# Patient Record
Sex: MALE | Race: WHITE | NOT HISPANIC OR LATINO | Employment: OTHER | URBAN - METROPOLITAN AREA
[De-identification: names, ages, dates, MRNs, and addresses within clinical notes are randomized per-mention and may not be internally consistent; named-entity substitution may affect disease eponyms.]

---

## 2017-05-31 RX ORDER — ROSUVASTATIN CALCIUM 5 MG/1
0.5 TABLET, COATED ORAL EVERY OTHER DAY
COMMUNITY
Start: 2017-03-28 | End: 2018-08-16

## 2017-06-01 ENCOUNTER — OFFICE VISIT (OUTPATIENT)
Dept: FAMILY MEDICINE | Facility: CLINIC | Age: 62
End: 2017-06-01
Payer: COMMERCIAL

## 2017-06-01 VITALS
TEMPERATURE: 98 F | HEIGHT: 67 IN | SYSTOLIC BLOOD PRESSURE: 119 MMHG | WEIGHT: 180 LBS | BODY MASS INDEX: 28.25 KG/M2 | HEART RATE: 77 BPM | DIASTOLIC BLOOD PRESSURE: 71 MMHG

## 2017-06-01 DIAGNOSIS — J20.9 ACUTE BRONCHITIS, UNSPECIFIED ORGANISM: Primary | ICD-10-CM

## 2017-06-01 PROBLEM — E78.2 MULTIPLE-TYPE HYPERLIPIDEMIA: Status: ACTIVE | Noted: 2017-06-01

## 2017-06-01 PROBLEM — Z13.89 ENCOUNTER FOR SCREENING FOR OTHER DISORDER: Status: ACTIVE | Noted: 2017-06-01

## 2017-06-01 PROBLEM — Z78.9 NON-SMOKER: Status: ACTIVE | Noted: 2017-06-01

## 2017-06-01 PROBLEM — I10 BENIGN ESSENTIAL HYPERTENSION: Status: ACTIVE | Noted: 2017-06-01

## 2017-06-01 PROBLEM — E66.3 OVERWEIGHT: Status: ACTIVE | Noted: 2017-06-01

## 2017-06-01 PROBLEM — F41.1 ANXIETY, GENERALIZED: Status: ACTIVE | Noted: 2017-06-01

## 2017-06-01 PROCEDURE — 99212 OFFICE O/P EST SF 10 MIN: CPT | Mod: ,,, | Performed by: INTERNAL MEDICINE

## 2017-06-01 RX ORDER — PRAVASTATIN SODIUM 20 MG/1
1 TABLET ORAL DAILY
COMMUNITY
Start: 2017-05-05 | End: 2018-08-16 | Stop reason: SDUPTHER

## 2017-06-01 NOTE — PATIENT INSTRUCTIONS
What Is Acute Bronchitis?  Acute or short-term bronchitis last for days or weeks. It occurs when the bronchial tubes (airways in the lungs) are irritated by a virus, bacteria, or allergen. This causes a cough that produces yellow or greenish mucus.  Inside healthy lungs    Air travels in and out of the lungs through the airways. The linings of these airways produce sticky mucus. This mucus traps particles that enter the lungs. Tiny structures called cilia then sweep the particles out of the airways.     Healthy airway: Airways are normally open. Air moves in and out easily.      Healthy cilia: Tiny, hairlike cilia sweep mucus and particles up and out of the airways.   Lungs with bronchitis  Bronchitis often occurs with a cold or the flu virus. The airways become inflamed (red and swollen). There is a deep hacking cough from the extra mucus. Other symptoms may include:  · Wheezing  · Chest discomfort  · Shortness of breath  · Mild fever  A second infection, this time due to bacteria, may then occur. And airways irritated by allergens or smoke are more likely to get infected.        Inflamed airway: Inflammation and extra mucus narrow the airway, causing shortness of breath.      Impaired cilia: Extra mucus impairs cilia, causing congestion and wheezing. Smoking makes the problem worse.   Making a diagnosis  A physical exam, health history, and certain tests help your healthcare provider make the diagnosis.  Health history  Your healthcare provider will ask you about your symptoms.  The exam  Your provider listens to your chest for signs of congestion. He or she may also check your ears, nose, and throat.  Possible tests  · A sputum test for bacteria. This requires a sample of mucus from the lungs.  · A nasal or throat swab for bacterial infection.  · A chest X-ray if your healthcare provider thinks you have pneumonia.  · Tests to check for an underlying condition, such as allergies, asthma, or COPD. You may need  to see a specialist for more lung function testing.  Treating a cough  The main treatment for bronchitis is easing symptoms. Avoiding smoke, allergens, and other things that trigger coughing can often help. If the infection is bacterial, you may be given antibiotics. During the illness, it's important to get plenty of sleep. To ease symptoms:  · Dont smoke, and avoid secondhand smoke.  · Use a humidifier, or breathe in steam from a hot shower. This may help loosen mucus.  · Drink a lot of water and juice. They can soothe the throat and may help thin mucus.  · Sit up or use extra pillows when in bed to help lessen coughing and congestion.  · Ask your provider about using cough medicine, pain and fever medicine, or a decongestant.  Antibiotics  Most cases of bronchitis are caused by cold or flu viruses. Antibiotics dont treat viral illness. Taking antibiotics when they are not needed increases your risk of getting an infection later that is antibiotic-resistant. Your provider will prescribe antibiotics if the infection is caused by bacteria. If they are prescribed:  · Take the medicine until it is used up, even if symptoms have improved. If you dont, the bronchitis may come back.  · Take them as directed. For instance, some medicines should be taken with food.  · Ask your provider or pharmacist what side effects are common, and what to do about them.  Follow-up care  You should see your provider again in 2 to 3 weeks. By this time, symptoms should have improved. An infection that lasts longer may mean you have a more serious problem.  Prevention  · Avoid tobacco smoke. If you smoke, quit. Stay away from smoky places. Ask friends and family not to smoke around you, or in your home or car.  · Get checked for allergies.  · Ask your provider about getting a yearly flu shot, and pneumococcal or pneumonia shots.  · Wash your hands often. This helps reduce the chance of picking up viruses that cause colds and flu.  Call  your healthcare provider if:  · Symptoms worsen, or new symptoms develop.  · Breathing problems worsen or  become severe.  · Symptoms dont get better within a week, or within 3 days of taking antibiotics.   Date Last Reviewed: 6/18/2014  © 5700-6110 [employer]. [address] [place] [address], [place] [address]. All rights reserved. This information is not intended as a substitute for professional medical care. Always follow your healthcare professional's instructions.

## 2017-06-01 NOTE — PROGRESS NOTES
Subjective:       Patient ID: Yan Hernandez Jr. is a 62 y.o. male.    Chief Complaint: Follow-up (ED  cough susy fever ST )    Past Medical History:   Diagnosis Date    Diabetes mellitus     Diabetes mellitus, type 2     GERD (gastroesophageal reflux disease)     Hyperlipidemia     Hypertension        History reviewed. No pertinent surgical history.    Family History   Problem Relation Age of Onset    Arthritis Mother     Alzheimer's disease Mother     Cancer Father        Social History     Social History    Marital status: Single     Spouse name: N/A    Number of children: N/A    Years of education: N/A     Social History Main Topics    Smoking status: Former Smoker    Smokeless tobacco: None    Alcohol use No    Drug use: No    Sexual activity: No     Other Topics Concern    None     Social History Narrative    None       Current Outpatient Prescriptions   Medication Sig Dispense Refill    aspirin (ECOTRIN) 81 MG EC tablet Take 81 mg by mouth once daily.      fenofibrate (TRICOR) 48 MG tablet Take 48 mg by mouth once daily.      lisinopril (PRINIVIL,ZESTRIL) 5 MG tablet Take by mouth once daily.      metformin (GLUCOPHAGE) 1000 MG tablet Take 1,000 mg by mouth 2 (two) times daily with meals.      pantoprazole (PROTONIX) 40 MG tablet Take 40 mg by mouth once daily.      paroxetine (PAXIL) 20 MG tablet Take 20 mg by mouth every morning.      pravastatin (PRAVACHOL) 20 MG tablet 1 tablet Daily.      rosuvastatin (CRESTOR) 5 MG tablet Take 0.5 tablets by mouth every other day.       No current facility-administered medications for this visit.        Review of patient's allergies indicates:   Allergen Reactions    Atorvastatin      myalgias    Influenza virus vaccines      Gets sick with shots       Patient is a 62-year-old occasion male who went to the emergency.  Few days back  For cough and cold symptoms. He was diagnosed with acute bronchitis and was prescribed  Z-Harsh and a strong  pulse. He had been afebrile. He did complain of some cough with  Greenish expectoration.  Patient is feeding significantly Better. His blood sugars are fairly under  Control.      Cough   This is a new problem. The current episode started in the past 7 days. The problem has been rapidly improving. The problem occurs hourly. Pertinent negatives include no chest pain, chills, ear pain, eye redness, fever, rash, rhinorrhea, sore throat or wheezing. Nothing aggravates the symptoms. He has tried prescription cough suppressant and rest (Zpak) for the symptoms. The treatment provided significant relief. There is no history of COPD or emphysema.     Review of Systems   Constitutional: Negative for activity change, chills and fever.   HENT: Negative for congestion, drooling, ear pain, nosebleeds, rhinorrhea, sore throat and voice change.    Eyes: Negative for redness and visual disturbance.   Respiratory: Negative for apnea, cough, choking and wheezing.    Cardiovascular: Negative for chest pain, palpitations and leg swelling.   Gastrointestinal: Negative for abdominal distention, abdominal pain and anal bleeding.   Endocrine: Negative for cold intolerance and heat intolerance.   Skin: Negative for color change and rash.       Objective:      Physical Exam   Constitutional: He is oriented to person, place, and time. He appears well-developed and well-nourished.   HENT:   Head: Normocephalic and atraumatic.   Nose: Nose normal.   Mouth/Throat: Oropharynx is clear and moist. No oropharyngeal exudate.   Eyes: Conjunctivae and EOM are normal.   Neck: Normal range of motion. Neck supple. No JVD present. No tracheal deviation present. No thyromegaly present.   Cardiovascular: Normal rate, regular rhythm and normal heart sounds.  Exam reveals no gallop and no friction rub.    No murmur heard.  Pulmonary/Chest: Effort normal and breath sounds normal. No respiratory distress. He has no wheezes. He has no rales.   Abdominal: Soft.  Bowel sounds are normal. He exhibits no distension. There is no tenderness.   Musculoskeletal: Normal range of motion.   Neurological: He is alert and oriented to person, place, and time. He has normal reflexes.   Skin: Skin is warm and dry.   Psychiatric: He has a normal mood and affect.   Nursing note and vitals reviewed.      Assessment:       1. Acute bronchitis, unspecified organism        Plan:       Patient seems to be recovering  Fairly well from the bronchitis symptoms.  He is almost finishing his course of Zithromax. He was prescribed Tessalon Perles also.    He has been recommended saltwater gargles and steam inhalation. He has some wax in the left ear for which I have made him recommendations for Debrox ear drops or hydrogen peroxide/warm water syringing.

## 2017-07-25 RX ORDER — FENOFIBRATE 160 MG/1
TABLET ORAL
Qty: 90 TABLET | Refills: 1 | Status: SHIPPED | OUTPATIENT
Start: 2017-07-25 | End: 2018-04-13 | Stop reason: SDUPTHER

## 2017-07-25 RX ORDER — PAROXETINE HYDROCHLORIDE HEMIHYDRATE 25 MG/1
TABLET, FILM COATED, EXTENDED RELEASE ORAL
Qty: 90 TABLET | Refills: 1 | Status: SHIPPED | OUTPATIENT
Start: 2017-07-25 | End: 2018-02-07 | Stop reason: SDUPTHER

## 2017-08-07 RX ORDER — LISINOPRIL 20 MG/1
TABLET ORAL
Qty: 90 TABLET | Refills: 4 | Status: SHIPPED | OUTPATIENT
Start: 2017-08-07 | End: 2018-08-16 | Stop reason: SDUPTHER

## 2017-09-04 PROBLEM — Z13.89 ENCOUNTER FOR SCREENING FOR OTHER DISORDER: Status: RESOLVED | Noted: 2017-06-01 | Resolved: 2017-09-04

## 2017-09-05 RX ORDER — METFORMIN HYDROCHLORIDE 1000 MG/1
1000 TABLET ORAL 2 TIMES DAILY WITH MEALS
Qty: 180 TABLET | Refills: 3 | Status: SHIPPED | OUTPATIENT
Start: 2017-09-05 | End: 2017-11-11 | Stop reason: SDUPTHER

## 2017-11-08 ENCOUNTER — TELEPHONE (OUTPATIENT)
Dept: FAMILY MEDICINE | Facility: CLINIC | Age: 62
End: 2017-11-08

## 2017-11-11 DIAGNOSIS — E11.9 TYPE 2 DIABETES MELLITUS WITHOUT COMPLICATION, WITHOUT LONG-TERM CURRENT USE OF INSULIN: Primary | ICD-10-CM

## 2017-11-11 RX ORDER — METFORMIN HYDROCHLORIDE 500 MG/1
500 TABLET ORAL 2 TIMES DAILY WITH MEALS
Qty: 180 TABLET | Refills: 3 | Status: SHIPPED | OUTPATIENT
Start: 2017-11-11 | End: 2018-08-20

## 2018-02-07 RX ORDER — PAROXETINE HYDROCHLORIDE HEMIHYDRATE 25 MG/1
TABLET, FILM COATED, EXTENDED RELEASE ORAL
Qty: 90 TABLET | Refills: 1 | Status: SHIPPED | OUTPATIENT
Start: 2018-02-07 | End: 2018-08-27 | Stop reason: SDUPTHER

## 2018-04-13 RX ORDER — FENOFIBRATE 160 MG/1
TABLET ORAL
Qty: 90 TABLET | Refills: 0 | Status: SHIPPED | OUTPATIENT
Start: 2018-04-13 | End: 2018-07-18 | Stop reason: SDUPTHER

## 2018-07-18 ENCOUNTER — TELEPHONE (OUTPATIENT)
Dept: FAMILY MEDICINE | Facility: CLINIC | Age: 63
End: 2018-07-18

## 2018-07-18 RX ORDER — FENOFIBRATE 160 MG/1
TABLET ORAL
Qty: 30 TABLET | Refills: 0 | Status: SHIPPED | OUTPATIENT
Start: 2018-07-18 | End: 2018-08-27 | Stop reason: SDUPTHER

## 2018-07-25 NOTE — TELEPHONE ENCOUNTER
Called and left a message for patient to call back to make an appointment for any further refills.

## 2018-08-13 RX ORDER — LISINOPRIL 20 MG/1
TABLET ORAL
Qty: 90 TABLET | Refills: 4 | OUTPATIENT
Start: 2018-08-13

## 2018-08-16 ENCOUNTER — OFFICE VISIT (OUTPATIENT)
Dept: FAMILY MEDICINE | Facility: CLINIC | Age: 63
End: 2018-08-16
Payer: COMMERCIAL

## 2018-08-16 VITALS
DIASTOLIC BLOOD PRESSURE: 87 MMHG | SYSTOLIC BLOOD PRESSURE: 130 MMHG | HEART RATE: 69 BPM | WEIGHT: 182 LBS | BODY MASS INDEX: 28.56 KG/M2 | HEIGHT: 67 IN

## 2018-08-16 DIAGNOSIS — I10 ESSENTIAL HYPERTENSION: ICD-10-CM

## 2018-08-16 DIAGNOSIS — F41.9 ANXIETY: ICD-10-CM

## 2018-08-16 DIAGNOSIS — K21.9 GASTROESOPHAGEAL REFLUX DISEASE WITHOUT ESOPHAGITIS: ICD-10-CM

## 2018-08-16 DIAGNOSIS — Z11.59 NEED FOR HEPATITIS C SCREENING TEST: ICD-10-CM

## 2018-08-16 DIAGNOSIS — E78.00 HYPERCHOLESTEREMIA: ICD-10-CM

## 2018-08-16 DIAGNOSIS — E11.9 TYPE 2 DIABETES MELLITUS WITHOUT COMPLICATION, WITHOUT LONG-TERM CURRENT USE OF INSULIN: Primary | ICD-10-CM

## 2018-08-16 LAB
ALBUMIN SERPL-MCNC: 4.5 G/DL (ref 3.1–4.7)
ALP SERPL-CCNC: 33 IU/L (ref 40–104)
ALT (SGPT): 32 IU/L (ref 3–33)
AST SERPL-CCNC: 24 IU/L (ref 10–40)
BILIRUB SERPL-MCNC: 0.9 MG/DL (ref 0.3–1)
BUN SERPL-MCNC: 24 MG/DL (ref 8–20)
CALCIUM SERPL-MCNC: 9.8 MG/DL (ref 7.7–10.4)
CHLORIDE: 102 MMOL/L (ref 98–110)
CO2 SERPL-SCNC: 24.1 MMOL/L (ref 22.8–31.6)
CREATININE RANDOM URINE: 163 MG/DL
CREATININE: 0.85 MG/DL (ref 0.6–1.4)
GLUCOSE: 149 MG/DL (ref 70–99)
HBA1C MFR BLD: 7.4 % (ref 3.1–6.5)
MICROALBUM.,U,RANDOM: 13 MCG/ML (ref 0–19.9)
MICROALBUMIN/CREATININE RATIO: 8 (ref 0–30)
POTASSIUM SERPL-SCNC: 4.2 MMOL/L (ref 3.5–5)
PROT SERPL-MCNC: 7.3 G/DL (ref 6–8.2)
SODIUM: 137 MMOL/L (ref 134–144)

## 2018-08-16 PROCEDURE — 3075F SYST BP GE 130 - 139MM HG: CPT | Mod: ,,, | Performed by: INTERNAL MEDICINE

## 2018-08-16 PROCEDURE — 3008F BODY MASS INDEX DOCD: CPT | Mod: ,,, | Performed by: INTERNAL MEDICINE

## 2018-08-16 PROCEDURE — 3079F DIAST BP 80-89 MM HG: CPT | Mod: ,,, | Performed by: INTERNAL MEDICINE

## 2018-08-16 PROCEDURE — 99214 OFFICE O/P EST MOD 30 MIN: CPT | Mod: ,,, | Performed by: INTERNAL MEDICINE

## 2018-08-16 RX ORDER — PRAVASTATIN SODIUM 20 MG/1
20 TABLET ORAL NIGHTLY
Qty: 90 TABLET | Refills: 1 | Status: SHIPPED | OUTPATIENT
Start: 2018-08-16 | End: 2018-10-31 | Stop reason: ALTCHOICE

## 2018-08-16 RX ORDER — LISINOPRIL 20 MG/1
20 TABLET ORAL DAILY
Qty: 90 TABLET | Refills: 1 | Status: SHIPPED | OUTPATIENT
Start: 2018-08-16 | End: 2019-02-19 | Stop reason: SDUPTHER

## 2018-08-16 NOTE — PROGRESS NOTES
Subjective:       Patient ID: Yan Hernandez Jr. is a 63 y.o. male.    Chief Complaint: Diabetes; Hypertension; Hyperlipidemia; Anxiety; and Gastroesophageal Reflux    Mr. Heredia is a 63-year-old  male who comes for follow-up. His underlying medical issues include diabetes mellitus, hypertension, dyslipidemia,gastroesophageal reflux, anxiety.    He states that blood sugars are doing ok. Blood pressures are okay. He cannot get off       Diabetes   He presents for his follow-up diabetic visit. He has type 2 diabetes mellitus. Hypoglycemia symptoms include nervousness/anxiousness. Pertinent negatives for hypoglycemia include no confusion, dizziness, mood changes, pallor, seizures or speech difficulty. Pertinent negatives for diabetes include no chest pain, no fatigue, no foot ulcerations, no polydipsia, no polyphagia and no polyuria. Symptoms are stable. Pertinent negatives for diabetic complications include no nephropathy, peripheral neuropathy or PVD. Risk factors for coronary artery disease include hypertension, male sex, diabetes mellitus and dyslipidemia. Current diabetic treatment includes oral agent (monotherapy). He is compliant with treatment most of the time. Meal planning includes avoidance of concentrated sweets. He has not had a previous visit with a dietitian. An ACE inhibitor/angiotensin II receptor blocker is being taken. He sees a podiatrist.Eye exam is current.   Hypertension   This is a chronic problem. The current episode started more than 1 year ago. Associated symptoms include anxiety. Pertinent negatives include no chest pain, palpitations or shortness of breath. Risk factors for coronary artery disease include male gender, diabetes mellitus and dyslipidemia. Past treatments include ACE inhibitors. The current treatment provides moderate improvement. There is no history of PVD. There is no history of coarctation of the aorta, a hypertension causing med or pheochromocytoma.   Hyperlipidemia    This is a chronic problem. The current episode started more than 1 year ago. He has no history of hypothyroidism or obesity. Pertinent negatives include no chest pain or shortness of breath. Current antihyperlipidemic treatment includes statins. The current treatment provides moderate improvement of lipids. Risk factors for coronary artery disease include hypertension, male sex, dyslipidemia and diabetes mellitus.   Anxiety   Presents for follow-up visit. Symptoms include nervous/anxious behavior. Patient reports no chest pain, confusion, depressed mood, dizziness, insomnia, malaise, palpitations, restlessness or shortness of breath. Symptoms occur occasionally. The severity of symptoms is moderate. The quality of sleep is fair.       Gastroesophageal Reflux   He complains of heartburn. He reports no abdominal pain, no chest pain, no coughing or no stridor. This is a chronic problem. The current episode started more than 1 year ago. Exacerbated by: Carbonated. Pertinent negatives include no fatigue. He has tried a PPI for the symptoms. The treatment provided moderate relief. Past procedures include an EGD (dysphagia and leg grade 1 esophagitis-Dr. Sprague).       Past Medical History:   Diagnosis Date    Allergy     atorvastatin, Influenza vaccine    Diabetes mellitus     Diabetes mellitus, type 2     GERD (gastroesophageal reflux disease)     Hyperlipidemia     Hypertension      Social History     Socioeconomic History    Marital status: Single     Spouse name: Not on file    Number of children: 0    Years of education: Not on file    Highest education level: Not on file   Social Needs    Financial resource strain: Not on file    Food insecurity - worry: Not on file    Food insecurity - inability: Not on file    Transportation needs - medical: Not on file    Transportation needs - non-medical: Not on file   Occupational History    Occupation: [employer]     Employer: [employer]  "  Tobacco Use    Smoking status: Former Smoker     Types: Cigarettes   Substance and Sexual Activity    Alcohol use: No    Drug use: No    Sexual activity: No   Other Topics Concern    Not on file   Social History Narrative    Not on file     History reviewed. No pertinent surgical history.  Family History   Problem Relation Age of Onset    Arthritis Mother     Alzheimer's disease Mother     Cancer Father        Review of Systems   Constitutional: Negative for activity change, appetite change, fatigue and unexpected weight change.   HENT: Negative for congestion, sneezing and trouble swallowing.    Eyes: Negative for pain and visual disturbance.   Respiratory: Negative for cough, chest tightness and shortness of breath.    Cardiovascular: Negative for chest pain, palpitations and leg swelling.        Hypertension stable.   Gastrointestinal: Positive for heartburn. Negative for abdominal distention, abdominal pain, blood in stool, constipation and diarrhea.        Gastroesophageal reflux with LA grade 1 esophagitis.   Endocrine: Negative for cold intolerance, heat intolerance, polydipsia, polyphagia and polyuria.        Diabetes mellitus type2.. Hyperlipidemia.   Genitourinary: Negative for dysuria, hematuria and scrotal swelling.   Musculoskeletal: Negative for arthralgias, back pain and gait problem.   Skin: Negative for pallor, rash and wound.   Allergic/Immunologic: Negative for environmental allergies, food allergies and immunocompromised state.   Neurological: Negative for dizziness, seizures, speech difficulty, light-headedness and numbness.   Hematological: Negative for adenopathy. Does not bruise/bleed easily.   Psychiatric/Behavioral: Negative for agitation, behavioral problems and confusion. The patient is nervous/anxious. The patient does not have insomnia.         History of anxiety stable on Paxil.         Objective:      Blood pressure 130/87, pulse 69, height 5' 7" (1.702 m), weight 82.6 kg " (182 lb). Body mass index is 28.51 kg/m².  Physical Exam   Constitutional: He appears well-developed.   HENT:   Head: Normocephalic and atraumatic.   Nose: Nose normal.   Mouth/Throat: Oropharynx is clear and moist. No oropharyngeal exudate.   Eyes: Conjunctivae and EOM are normal.   Neck: Normal range of motion. Neck supple. No JVD present. No tracheal deviation present. No thyromegaly present.   Cardiovascular: Normal rate, regular rhythm and normal heart sounds. Exam reveals no gallop and no friction rub.   No murmur heard.  Pulmonary/Chest: Effort normal and breath sounds normal. No respiratory distress. He has no wheezes. He has no rales.   Abdominal: Soft. Bowel sounds are normal. He exhibits no distension. There is no tenderness.   Musculoskeletal:        Right foot: There is normal range of motion and no deformity.        Left foot: There is normal range of motion and no deformity.   Feet:   Right Foot:   Protective Sensation: 5 sites tested. 5 sites sensed.   Skin Integrity: Negative for ulcer, blister or skin breakdown.   Left Foot:   Protective Sensation: 5 sites tested. 5 sites sensed.   Skin Integrity: Negative for ulcer, blister or skin breakdown.   Neurological: He is alert. He has normal reflexes.   Skin: Skin is warm and dry.   Psychiatric: He has a normal mood and affect.   Nursing note and vitals reviewed.        Assessment:       1. Type 2 diabetes mellitus without complication, without long-term current use of insulin    2. Hypercholesteremia    3. Anxiety    4. Essential hypertension    5. Gastroesophageal reflux disease without esophagitis    6. Need for hepatitis C screening test           No visits with results within 3 Month(s) from this visit.   Latest known visit with results is:   Admission on 01/16/2016, Discharged on 01/16/2016   Component Date Value Ref Range Status    WBC 01/16/2016 5.25  3.90 - 12.70 K/uL Final    RBC 01/16/2016 4.83  4.60 - 6.20 M/uL Final    Hemoglobin  01/16/2016 14.2  14.0 - 18.0 g/dL Final    Hematocrit 01/16/2016 40.6  40.0 - 54.0 % Final    MCV 01/16/2016 84  82 - 98 fL Final    MCH 01/16/2016 29.4  27.0 - 31.0 pg Final    MCHC 01/16/2016 35.0  32.0 - 36.0 % Final    RDW 01/16/2016 12.1  11.5 - 14.5 % Final    Platelets 01/16/2016 235  150 - 350 K/uL Final    MPV 01/16/2016 9.7  9.2 - 12.9 fL Final    Gran # (ANC) 01/16/2016 3.2  1.8 - 7.7 K/uL Final    Lymph # 01/16/2016 1.2  1.0 - 4.8 K/uL Final    Mono # 01/16/2016 0.4  0.3 - 1.0 K/uL Final    Eos # 01/16/2016 0.4  0.0 - 0.5 K/uL Final    Baso # 01/16/2016 0.05  0.00 - 0.20 K/uL Final    Gran% 01/16/2016 60.7  38.0 - 73.0 % Final    Lymph% 01/16/2016 21.9  18.0 - 48.0 % Final    Mono% 01/16/2016 7.2  4.0 - 15.0 % Final    Eosinophil% 01/16/2016 8.4* 0.0 - 8.0 % Final    Basophil% 01/16/2016 1.0  0.0 - 1.9 % Final    Differential Method 01/16/2016 Automated   Final    Sodium 01/16/2016 138  136 - 145 mmol/L Final    Potassium 01/16/2016 4.2  3.5 - 5.1 mmol/L Final    Chloride 01/16/2016 104  95 - 110 mmol/L Final    CO2 01/16/2016 25  23 - 29 mmol/L Final    Glucose 01/16/2016 111* 70 - 110 mg/dL Final    BUN, Bld 01/16/2016 15  8 - 23 mg/dL Final    Creatinine 01/16/2016 0.9  0.5 - 1.4 mg/dL Final    Calcium 01/16/2016 9.7  8.7 - 10.5 mg/dL Final    Total Protein 01/16/2016 6.9  6.0 - 8.4 g/dL Final    Albumin 01/16/2016 4.4  3.5 - 5.2 g/dL Final    Total Bilirubin 01/16/2016 0.6  0.1 - 1.0 mg/dL Final    Alkaline Phosphatase 01/16/2016 47* 55 - 135 U/L Final    AST 01/16/2016 18  10 - 40 U/L Final    ALT 01/16/2016 37  10 - 44 U/L Final    Anion Gap 01/16/2016 9  8 - 16 mmol/L Final    eGFR if African American 01/16/2016 >60  >60 mL/min/1.73 m^2 Final    eGFR if non African American 01/16/2016 >60  >60 mL/min/1.73 m^2 Final    Prothrombin Time 01/16/2016 10.6  9.0 - 12.5 sec Final    INR 01/16/2016 1.0  0.8 - 1.2 Final    Troponin I 01/16/2016 <0.006  0.000 - 0.026  ng/mL Final    BNP 01/16/2016 11  0 - 99 pg/mL Final    Specimen UA 01/16/2016 Urine, Clean Catch   Final    Color, UA 01/16/2016 Yellow  Yellow, Straw, Nina Final    Appearance, UA 01/16/2016 Clear  Clear Final    pH, UA 01/16/2016 7.0  5.0 - 8.0 Final    Specific Gravity, UA 01/16/2016 <=1.005* 1.005 - 1.030 Final    Protein, UA 01/16/2016 Negative  Negative Final    Glucose, UA 01/16/2016 Negative  Negative Final    Ketones, UA 01/16/2016 Negative  Negative Final    Bilirubin (UA) 01/16/2016 Negative  Negative Final    Occult Blood UA 01/16/2016 Negative  Negative Final    Nitrite, UA 01/16/2016 Negative  Negative Final    Urobilinogen, UA 01/16/2016 Negative  <2.0 EU/dL Final    Leukocytes, UA 01/16/2016 Negative  Negative Final    POCT Glucose 01/16/2016 105  70 - 110 mg/dL Final         Plan:           Type 2 diabetes mellitus without complication, without long-term current use of insulin  -     Microalbumin/creatinine urine ratio; Future; Expected date: 08/16/2018  -     Hemoglobin A1c; Future; Expected date: 08/16/2018    Hypercholesteremia  -     Lipid panel; Future; Expected date: 08/16/2018  -     pravastatin (PRAVACHOL) 20 MG tablet; Take 1 tablet (20 mg total) by mouth every evening.  Dispense: 90 tablet; Refill: 1    Anxiety    Essential hypertension  -     Comprehensive metabolic panel; Future; Expected date: 08/16/2018  -     lisinopril (PRINIVIL,ZESTRIL) 20 MG tablet; Take 1 tablet (20 mg total) by mouth once daily.  Dispense: 90 tablet; Refill: 1    Gastroesophageal reflux disease without esophagitis    Need for hepatitis C screening test  -     Hepatitis C antibody; Future; Expected date: 08/16/2018    Other orders  -     Cancel: Hemoglobin A1c; Future; Expected date: 08/16/2018    Advised Mr. Hernandez to monitor Blood sugars at home and record them.  Exercise, watch diet and loose weight.  keep a close eye on feet and keep them clean. Annual eye examination. Annual influenza vaccine.   Monitor HgbA1c every 3 to 6 months. Monitor urine microalbumin every year.keep LDL less than 100. Monitor blood pressure and target blood pressure 120/70.        The patient is asked to make an attempt to improve diet and exercise patterns to aid in medical management of this problem.    Patient has been advised to watch diet and exercise. Avoid fried and fatty food. Compliance to medications and follow up urged.    I Did discuss briefly about the possibility of gradually weaning off Paxil.  Patient panicked at this thought. Will table this discussion for future.    Advised Mr. Hernandez for Anti reflux measures like small feequent meals, avoid spicy and greasy food. Head end up at night.    Fup 4 months      Current Outpatient Medications:     aspirin (ECOTRIN) 81 MG EC tablet, Take 81 mg by mouth once daily., Disp: , Rfl:     fenofibrate 160 MG Tab, TAKE 1 TABLET BY MOUTH AT BEDTIME, Disp: 30 tablet, Rfl: 0    lisinopril (PRINIVIL,ZESTRIL) 20 MG tablet, Take 1 tablet (20 mg total) by mouth once daily., Disp: 90 tablet, Rfl: 1    metFORMIN (GLUCOPHAGE) 500 MG tablet, Take 1 tablet (500 mg total) by mouth 2 (two) times daily with meals., Disp: 180 tablet, Rfl: 3    pantoprazole (PROTONIX) 40 MG tablet, Take 40 mg by mouth once daily., Disp: , Rfl:     paroxetine (PAXIL-CR) 25 MG 24 hr tablet, TAKE ONE TABLET BY MOUTH ONCE DAILY, Disp: 90 tablet, Rfl: 1    pravastatin (PRAVACHOL) 20 MG tablet, Take 1 tablet (20 mg total) by mouth every evening., Disp: 90 tablet, Rfl: 1

## 2018-08-16 NOTE — PATIENT INSTRUCTIONS
Diabetes: Activity Tips    Being more active can help you manage your diabetes. The tips on this sheet can help you get the most from your exercise. They can also help you stay safe.  Staying Active  Its important for adults to spend less time sitting and being inactive. This is especially true if you have type 2 diabetes. When you are sitting for long periods of time, get up for short sessions of light activity every 30 minutes.  You should aim for at least 150 minutes a week of exercise or physical activity. Dont let more than 2 days go by without being active.  Benefit from briskness  Brisk activity gets your heart beating faster. This can help you increase your fitness, lose extra weight, and manage your blood sugar level. Try brisk walking. Or, if you have foot or leg problems, you can try swimming or bike riding. You can break up your exercise into chunks throughout the day. Work up to at least 30 minutes of steady, brisk exercise on most days.  Warm up and cool down  Warming up and cooling down reduce your risk of injury. They also help limit muscle soreness. Do a mild version of your activity for 5 minutes before and after your routine. You can also learn stretches that will help keep your muscles loose. Your healthcare provider may show you good ways to warm up and stretch.  Do the talk-sing test  The talk-sing test is a simple way to tell how hard youre exercising. If you can talk while exercising, youre in a safe range. If youre out of breath, slow down. If you can carry a tune, its time to  the pace. Walk up a hill. Increase the resistance on your stationary bike. Or swim faster.  What about eating?  You may be told to plan your exercise for 1 to 2 hours after a meal. In most cases, you dont need to eat while being active. If you take insulin or medicine that can cause low blood sugar, test your blood sugar before exercising. And carry a fast-acting sugar that will raise your blood sugar  level quickly. This includes glucose tablets or hard candy. Use it if you feel low blood sugar symptoms.  Safety tips  These tips can help you stay safe as you become fit:  · Exercise with a friend or carry a cell phone if you have one.  · Carry or wear identification, such as a necklace or bracelet, that says you have diabetes.  · Use the proper footwear and safety equipment for your activity.  · Drink water before, during, and after exercise.  · Dress properly for the weather.  · Dont exercise in very hot or very cold weather.  · Dont exercise if you are sick.  · If you are instructed to do so, test your blood sugar before and after you exercise. Have a small carbohydrate snack if your blood sugar is low before you start exercising.   When to stop exercising and call your healthcare provider  Stop exercising and call your healthcare provider right away if you notice any of the following:  · Pain, pressure, tightness, or heaviness in the chest  · Pain or heaviness in the neck, shoulders, back, arms, legs, or feet  · Unusual shortness of breath  · Dizziness or lightheadedness  · Unusually rapid or slow pulse  · Increased joint or muscle pain  · Nausea or vomiting  Date Last Reviewed: 5/1/2016  © 6778-7365 [employer]. [address] [place] [address], [place] [address]. All rights reserved. This information is not intended as a substitute for professional medical care. Always follow your healthcare professional's instructions.        Tips to Control Acid Reflux    To control acid reflux, youll need to make some basic diet and lifestyle changes. The simple steps outlined below may be all youll need to ease discomfort.  Watch what you eat  · Avoid fatty foods and spicy foods.  · Eat fewer acidic foods, such as citrus and tomato-based foods. These can increase symptoms.  · Limit drinking alcohol, caffeine, and fizzy beverages. All increase acid reflux.  · Try limiting chocolate, peppermint, and spearmint. These  can worsen acid reflux in some people.  Watch when you eat  · Avoid lying down for 3 hours after eating.  · Do not snack before going to bed.  Raise your head  Raising your head and upper body by 4 to 6 inches helps limit reflux when youre lying down. Put blocks under the head of your bed frame to raise it.  Other changes  · Lose weight, if you need to  · Dont exercise near bedtime  · Avoid tight-fitting clothes  · Limit aspirin and ibuprofen  · Stop smoking   Date Last Reviewed: 7/1/2016 © 2000-2017 [employer]. [address] [place] [address]. All rights reserved. This information is not intended as a substitute for professional medical care. Always follow your healthcare professional's instructions.        Anxiety Reaction  Anxiety is the feeling we all get when we think something bad might happen. It is a normal response to stress and usually causes only a mild reaction. When anxiety becomes more severe, it can interfere with daily life. In some cases, you may not even be aware of what it is youre anxious about. There may also be a genetic link or it may be a learned behavior in the home.  Both psychological and physical triggers cause stress reaction. It's often a response to fear or emotional stress, real or imagined. This stress may come from home, family, work, or social relationships.  During an anxiety reaction, you may feel:  · Helpless  · Nervous  · Depressed  · Irritable  Your body may show signs of anxiety in many ways. You may experience:  · Dry mouth  · Shakiness  · Dizziness  · Weakness  · Trouble breathing  · Breathing fast (hyperventilating)  · Chest pressure  · Sweating  · Headache  · Nausea  · Diarrhea  · Tiredness  · Inability to sleep  · Sexual problems  Home care  · Try to locate the sources of stress in your life. They may not be obvious. These may include:  ¨ Daily hassles of life (traffic jams, missed appointments, car troubles, etc.)  ¨ Major life changes, both  good (new baby, job promotion) and bad (loss of job, loss of loved one)  ¨ Overload: feeling that you have too many responsibilities and can't take care of all of them at once  ¨ Feeling helpless, feeling that your problems are beyond what youre able to solve  · Notice how your body reacts to stress. Learn to listen to your body signals. This will help you take action before the stress becomes severe.  · When you can, do something about the source of your stress. (Avoid hassles, limit the amount of change that happens in your life at one time and take a break when you feel overloaded).  · Unfortunately, many stressful situations can't be avoided. It is necessary to learn how to better manage stress. There are many proven methods that will reduce your anxiety. These include simple things like exercise, good nutrition and adequate rest. Also, there are certain techniques that are helpful:  ¨ Relaxation  ¨ Breathing exercises  ¨ Visualization  ¨ Biofeedback  ¨ Meditation  For more information about this, consult your doctor or go to a local bookstore and review the many books and tapes available on this subject.  Follow-up care  If you feel that your anxiety is not responding to self-help measures, contact your doctor or make an appointment with a counselor. You may need short-term psychological counseling and temporary medicine to help you manage stress.  Call 911  Call your healthcare provider right away if any of these occur:  · Trouble breathing  · Confusion  · Drowsiness or trouble wakening  · Fainting or loss of consciousness  · Rapid heart rate  · Seizure  · New chest pain that becomes more severe, lasts longer, or spreads into your shoulder, arm, neck, jaw, or back  When to seek medical advice  Call your healthcare provider right away if any of these occur:  · Your symptoms get worse  · Severe headache not relieved by rest and mild pain reliever  Date Last Reviewed: 9/29/2015  © 4713-5237 [employer] [address] [place] [address]. All rights reserved. This information is not intended as a substitute for professional medical care. Always follow your healthcare professional's instructions.

## 2018-08-17 LAB — HCV AB SERPL QL IA: 0.1 S/CO RATIO (ref 0–0.9)

## 2018-08-18 ENCOUNTER — TELEPHONE (OUTPATIENT)
Dept: FAMILY MEDICINE | Facility: CLINIC | Age: 63
End: 2018-08-18

## 2018-08-18 NOTE — TELEPHONE ENCOUNTER
Attempted calling patient at least on 2 occasions and left message on one occasion. Hemoglobin A1c is elevated. I may need to make adjustments in medications.

## 2018-08-20 RX ORDER — METFORMIN HYDROCHLORIDE 850 MG/1
850 TABLET ORAL 2 TIMES DAILY WITH MEALS
COMMUNITY
End: 2018-08-20 | Stop reason: SDUPTHER

## 2018-08-20 RX ORDER — METFORMIN HYDROCHLORIDE 850 MG/1
850 TABLET ORAL 2 TIMES DAILY WITH MEALS
Qty: 180 TABLET | Refills: 1 | Status: SHIPPED | OUTPATIENT
Start: 2018-08-20 | End: 2019-03-07 | Stop reason: SDUPTHER

## 2018-08-21 ENCOUNTER — TELEPHONE (OUTPATIENT)
Dept: FAMILY MEDICINE | Facility: CLINIC | Age: 63
End: 2018-08-21

## 2018-08-27 RX ORDER — PAROXETINE HYDROCHLORIDE HEMIHYDRATE 25 MG/1
TABLET, FILM COATED, EXTENDED RELEASE ORAL
Qty: 90 TABLET | Refills: 1 | Status: SHIPPED | OUTPATIENT
Start: 2018-08-27 | End: 2019-03-11 | Stop reason: SDUPTHER

## 2018-08-27 RX ORDER — FENOFIBRATE 160 MG/1
TABLET ORAL
Qty: 30 TABLET | Refills: 0 | Status: SHIPPED | OUTPATIENT
Start: 2018-08-27 | End: 2018-10-03 | Stop reason: SDUPTHER

## 2018-10-03 RX ORDER — FENOFIBRATE 160 MG/1
TABLET ORAL
Qty: 10 TABLET | Refills: 0 | Status: SHIPPED | OUTPATIENT
Start: 2018-10-03 | End: 2018-10-30 | Stop reason: SDUPTHER

## 2018-10-26 ENCOUNTER — OFFICE VISIT (OUTPATIENT)
Dept: URGENT CARE | Facility: CLINIC | Age: 63
End: 2018-10-26
Payer: COMMERCIAL

## 2018-10-26 VITALS
HEART RATE: 100 BPM | DIASTOLIC BLOOD PRESSURE: 79 MMHG | WEIGHT: 179 LBS | TEMPERATURE: 99 F | RESPIRATION RATE: 12 BRPM | SYSTOLIC BLOOD PRESSURE: 121 MMHG | OXYGEN SATURATION: 100 % | HEIGHT: 67 IN | BODY MASS INDEX: 28.09 KG/M2

## 2018-10-26 DIAGNOSIS — R09.81 NASAL SINUS CONGESTION: Primary | ICD-10-CM

## 2018-10-26 DIAGNOSIS — Z98.890 STATUS POST RHINOPLASTY: ICD-10-CM

## 2018-10-26 PROCEDURE — 3008F BODY MASS INDEX DOCD: CPT | Mod: CPTII,S$GLB,, | Performed by: EMERGENCY MEDICINE

## 2018-10-26 PROCEDURE — 99213 OFFICE O/P EST LOW 20 MIN: CPT | Mod: S$GLB,,, | Performed by: EMERGENCY MEDICINE

## 2018-10-26 NOTE — LETTER
October 26, 2018      [facility]  [address]  [place] [address]  Phone: [phone]       Patient: Yan Hernandez   YOB: 1955  Date of Visit: 10/26/2018    To Whom It May Concern:    Sam Hernandez  was at [facility] on 10/26/2018. He may return to work/school on 10/29/18 with no restrictions. If you have any questions or concerns, or if I can be of further assistance, please do not hesitate to contact me.    Sincerely,    Gabby Oliver, NP

## 2018-10-26 NOTE — PROGRESS NOTES
"Subjective:       Patient ID: Yan Hernandez Jr. is a 63 y.o. male.    Vitals:  height is 5' 7" (1.702 m) and weight is 81.2 kg (179 lb). His oral temperature is 98.9 °F (37.2 °C). His blood pressure is 121/79 and his pulse is 100. His respiration is 12 and oxygen saturation is 100%.     Chief Complaint: Sinusitis and Generalized Body Aches (arms amd upper back )    Pt presents with nasal congestion. Pt had recent rhinoplasty and deviated septum repair done 1 week ago. He is currently on Bactrim. He was told by his ENT to perform nasal irrigation bid but he has not done this. He did not call his ENT today to notify of symptoms. He denies f/c/n/v.       Sinusitis   This is a new problem. The current episode started in the past 7 days. There has been no fever. He is experiencing no pain. Associated symptoms include congestion, headaches and sinus pressure. Pertinent negatives include no chills, coughing, ear pain, hoarse voice, shortness of breath or sore throat. (Occipital H/A) Past treatments include acetaminophen (cough drops).     Review of Systems   Constitution: Negative for chills, fever, weakness and malaise/fatigue.   HENT: Positive for congestion and sinus pressure. Negative for ear pain, hoarse voice and sore throat.    Eyes: Negative for discharge and redness.   Cardiovascular: Negative for chest pain, dyspnea on exertion and leg swelling.   Respiratory: Negative for cough, shortness of breath, sputum production and wheezing.    Musculoskeletal: Negative for myalgias.   Gastrointestinal: Negative for abdominal pain and nausea.   Neurological: Positive for headaches.       Objective:      Physical Exam   Constitutional: He is oriented to person, place, and time. He appears well-developed and well-nourished. He is cooperative.  Non-toxic appearance. He does not appear ill. No distress.   HENT:   Head: Normocephalic and atraumatic.   Right Ear: Hearing, tympanic membrane, external ear and ear canal normal. "   Left Ear: Hearing, tympanic membrane, external ear and ear canal normal.   Nose: No mucosal edema, rhinorrhea, nasal deformity, septal deviation or nasal septal hematoma. No epistaxis. Right sinus exhibits no maxillary sinus tenderness and no frontal sinus tenderness. Left sinus exhibits no maxillary sinus tenderness and no frontal sinus tenderness.   Mouth/Throat: Uvula is midline, oropharynx is clear and moist and mucous membranes are normal. No trismus in the jaw. Normal dentition. No uvula swelling. No posterior oropharyngeal erythema.   Dried mucous and blood to bilat nares. No edema. No active bleeding.    Eyes: Conjunctivae and lids are normal. No scleral icterus.   Sclera clear bilat   Neck: Trachea normal, full passive range of motion without pain and phonation normal. Neck supple.   Cardiovascular: Normal rate, regular rhythm, normal heart sounds, intact distal pulses and normal pulses.   Pulmonary/Chest: Effort normal and breath sounds normal. No respiratory distress.   Abdominal: Soft. Normal appearance and bowel sounds are normal. He exhibits no distension. There is no tenderness.   Musculoskeletal: Normal range of motion. He exhibits no edema or deformity.   Neurological: He is alert and oriented to person, place, and time. He exhibits normal muscle tone. Coordination normal.   Skin: Skin is warm, dry and intact. He is not diaphoretic. No pallor.   Psychiatric: He has a normal mood and affect. His speech is normal and behavior is normal. Judgment and thought content normal. Cognition and memory are normal.   Nursing note and vitals reviewed.      Assessment:       1. Nasal sinus congestion    2. Status post rhinoplasty        Plan:         Nasal sinus congestion    Status post rhinoplasty    Perform nasal washing as directed by ENT. Call ENT if symptoms persist or worsen.

## 2018-10-26 NOTE — PATIENT INSTRUCTIONS
Perform nasal wash/irrigation as directed by your ENT. Call your ENT if symptoms persist or worsen.

## 2018-10-30 RX ORDER — FENOFIBRATE 160 MG/1
TABLET ORAL
Qty: 90 TABLET | Refills: 0 | Status: SHIPPED | OUTPATIENT
Start: 2018-10-30 | End: 2018-10-31 | Stop reason: SDUPTHER

## 2018-10-31 ENCOUNTER — OFFICE VISIT (OUTPATIENT)
Dept: FAMILY MEDICINE | Facility: CLINIC | Age: 63
End: 2018-10-31
Payer: COMMERCIAL

## 2018-10-31 VITALS
HEART RATE: 86 BPM | DIASTOLIC BLOOD PRESSURE: 76 MMHG | SYSTOLIC BLOOD PRESSURE: 138 MMHG | BODY MASS INDEX: 28.72 KG/M2 | WEIGHT: 183 LBS | HEIGHT: 67 IN

## 2018-10-31 DIAGNOSIS — E11.9 TYPE 2 DIABETES MELLITUS WITHOUT COMPLICATION, WITHOUT LONG-TERM CURRENT USE OF INSULIN: Primary | ICD-10-CM

## 2018-10-31 DIAGNOSIS — E78.00 HYPERCHOLESTEREMIA: ICD-10-CM

## 2018-10-31 DIAGNOSIS — I10 ESSENTIAL HYPERTENSION: ICD-10-CM

## 2018-10-31 PROCEDURE — 3008F BODY MASS INDEX DOCD: CPT | Mod: ,,, | Performed by: INTERNAL MEDICINE

## 2018-10-31 PROCEDURE — 3078F DIAST BP <80 MM HG: CPT | Mod: ,,, | Performed by: INTERNAL MEDICINE

## 2018-10-31 PROCEDURE — 3045F PR MOST RECENT HEMOGLOBIN A1C LEVEL 7.0-9.0%: CPT | Mod: ,,, | Performed by: INTERNAL MEDICINE

## 2018-10-31 PROCEDURE — 99214 OFFICE O/P EST MOD 30 MIN: CPT | Mod: ,,, | Performed by: INTERNAL MEDICINE

## 2018-10-31 PROCEDURE — 3075F SYST BP GE 130 - 139MM HG: CPT | Mod: ,,, | Performed by: INTERNAL MEDICINE

## 2018-10-31 RX ORDER — ATORVASTATIN CALCIUM 20 MG/1
20 TABLET, FILM COATED ORAL DAILY
Qty: 90 TABLET | Refills: 3 | Status: SHIPPED | OUTPATIENT
Start: 2018-10-31 | End: 2019-05-23 | Stop reason: RX

## 2018-10-31 RX ORDER — FENOFIBRATE 160 MG/1
TABLET ORAL
Qty: 90 TABLET | Refills: 3 | Status: SHIPPED | OUTPATIENT
Start: 2018-10-31 | End: 2019-12-04 | Stop reason: SDUPTHER

## 2018-10-31 NOTE — PATIENT INSTRUCTIONS
Using a Blood Sugar Log    You have diabetes. This means your body has trouble regulating a sugar called glucose. To help manage your diabetes, youll need to check your blood sugar level as directed by your healthcare provider. Keeping a log of your blood sugar levels will help you track your blood sugar readings. Its a simple and easy way to see how well you are controlling your diabetes.  Checking your blood sugar level  You can check your blood sugar level with a blood glucose meter. Youll first prick the side of your finger with a tiny lancet to draw a tiny drop of blood onto the test strip. Some glucose meters let you use another place on your body to test. But these other places should not be used in some cases as they may be inaccurate. Follow the instructions for your glucose meter. And talk with your healthcare provider before doing the test on other places.  The strip goes into the meter first, then a drop of blood is placed on the tip of the strip. The meter then shows a reading that tells you the level of your blood sugar. Your readings should be in your target range as often as possible. This means not too high or too low. Staying in this range helps lower your risk for complications. Your healthcare provider will help you figure out the target range that is best for you.  Tracking your readings  Every time you check your blood sugar, use your log to keep track of your readings. Your meter will also probably have a memory feature that your healthcare provider can check at your next visit. You may be advised by your healthcare provider to check your blood sugar in the morning, at bedtime, and before and after meals. Be sure to write down all of your numbers. Also use your log to record things that might have affected your blood sugar. Some examples include being sick, certain medicines, being physically active, feeling stressed, or skipping meals.   Lessons learned from your readings  Tracking your  blood sugar readings helps you see patterns. These patterns tell you how your actions affect your blood sugar. For instance, you may have higher numbers after eating certain foods or lower numbers after exercise. They just help you understand how to stay in your target range more often, so that your diabetes remains in good control.  Sharing your log with your healthcare team  Bring your blood sugar log and glucose meter with you to all of your healthcare appointments. This can help your healthcare team make changes to your treatment plan, if needed. This may involve making changes in what you eat, what medicines you take, or how much you exercise.  To learn more  The resources below can help you learn more:  · American Diabetes Association 958-505-3112 www.diabetes.org  · [employer] 154-970-2644 www.lighthouse.org  · [employer] 717-060-0972 www.nei.nih.gov  · [employer] 576-656-5521 www.hormone.org  Date Last Reviewed: 5/1/2016  © 6752-2616 [employer]. [address] [place] [address], [place] [address]. All rights reserved. This information is not intended as a substitute for professional medical care. Always follow your healthcare professional's instructions.

## 2018-10-31 NOTE — PROGRESS NOTES
Subjective:       Patient ID: Yan Hernandez Jr. is a 63 y.o. male.    Chief Complaint: Diabetes; Hypertension; and Hyperlipidemia    Mr Hernandez is a 63 Y male who comes for follow-up. He has underlying diabetes, hypertension and dyslipidemia. He states that he is doing okay with his blood sugars and blood pressures. No chest pains. No shortness of breath.    He is still actively working for [employer]. He is very active and if he had his day he would have continued to work for another 35 years.      Next year he will Be eligible for Medicare and is looking forward to that benefit.    I did notice his lipid panel last time and his LDL cholesterol was about 100. He is currently taking pravastatin 20 mg. Past records had shown that he might have been on atorvastatin and there was a question of intolerance to atorvastatin. Patient does not recall the back and like could not find any evidence of intolerance to atorvastatin. I would like to give trial of atorvastatin besides the other medications that he is taking.      Diabetes   He presents for his follow-up diabetic visit. He has type 2 diabetes mellitus. His disease course has been stable. Hypoglycemia symptoms include nervousness/anxiousness. Pertinent negatives for hypoglycemia include no confusion, dizziness, pallor, seizures or speech difficulty. Pertinent negatives for diabetes include no chest pain, no fatigue, no foot ulcerations, no polydipsia, no polyphagia and no polyuria. Risk factors for coronary artery disease include hypertension and male sex. He is compliant with treatment some of the time. Meal planning includes avoidance of concentrated sweets. His breakfast blood glucose range is generally 130-140 mg/dl. An ACE inhibitor/angiotensin II receptor blocker is being taken. He does not see a podiatrist.  Hypertension   This is a chronic problem. The current episode started more than 1 year ago. The problem is controlled. Pertinent negatives  include no chest pain, palpitations or shortness of breath. Risk factors for coronary artery disease include male gender, diabetes mellitus and dyslipidemia. Past treatments include ACE inhibitors. The current treatment provides moderate improvement. There is no history of pheochromocytoma or renovascular disease.   Hyperlipidemia   This is a chronic problem. The current episode started more than 1 year ago. The problem is controlled. Pertinent negatives include no chest pain or shortness of breath. Current antihyperlipidemic treatment includes statins. Risk factors for coronary artery disease include hypertension, male sex, diabetes mellitus and dyslipidemia.       Past Medical History:   Diagnosis Date    Allergy     atorvastatin, Influenza vaccine    Diabetes mellitus     Diabetes mellitus, type 2     GERD (gastroesophageal reflux disease)     Hx of rhinoplasty 10/17/2018    Hyperlipidemia     Hypertension      Social History     Socioeconomic History    Marital status: Single     Spouse name: Not on file    Number of children: 0    Years of education: Not on file    Highest education level: Not on file   Social Needs    Financial resource strain: Not on file    Food insecurity - worry: Not on file    Food insecurity - inability: Not on file    Transportation needs - medical: Not on file    Transportation needs - non-medical: Not on file   Occupational History    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 No pertinent surgical history.  Family History   Problem Relation Age of Onset    Arthritis Mother     Alzheimer's disease Mother     Cancer Father        Review of Systems   Constitutional: Negative for activity  "change, appetite change, fatigue and unexpected weight change.   HENT: Negative for congestion, sneezing and trouble swallowing.    Eyes: Negative for pain and visual disturbance.   Respiratory: Negative for cough, chest tightness and shortness of breath.    Cardiovascular: Negative for chest pain, palpitations and leg swelling.        Hypertension stable.   Gastrointestinal: Negative for abdominal distention, abdominal pain, blood in stool, constipation and diarrhea.        Gastroesophageal reflux with LA grade 1 esophagitis.   Endocrine: Negative for cold intolerance, heat intolerance, polydipsia, polyphagia and polyuria.        Diabetes mellitus type2.. Hyperlipidemia.   Genitourinary: Negative for dysuria, hematuria and scrotal swelling.   Musculoskeletal: Negative for arthralgias, back pain and gait problem.   Skin: Negative for pallor, rash and wound.   Allergic/Immunologic: Negative for environmental allergies, food allergies and immunocompromised state.   Neurological: Negative for dizziness, seizures, speech difficulty, light-headedness and numbness.   Hematological: Negative for adenopathy. Does not bruise/bleed easily.   Psychiatric/Behavioral: Negative for agitation, behavioral problems and confusion. The patient is nervous/anxious.         History of anxiety stable on Paxil.         Objective:      Blood pressure 138/76, pulse 86, height 5' 7" (1.702 m), weight 83 kg (183 lb). Body mass index is 28.66 kg/m².  Physical Exam   Constitutional: He appears well-developed.   HENT:   Head: Normocephalic and atraumatic.   Nose: Nose normal.   Mouth/Throat: Oropharynx is clear and moist. No oropharyngeal exudate.   Eyes: Conjunctivae and EOM are normal.   Neck: Normal range of motion. Neck supple. No JVD present. No tracheal deviation present. No thyromegaly present.   Cardiovascular: Normal rate, regular rhythm and normal heart sounds. Exam reveals no gallop and no friction rub.   No murmur " heard.  Pulmonary/Chest: Effort normal and breath sounds normal. No respiratory distress. He has no wheezes. He has no rales.   Abdominal: Soft. Bowel sounds are normal. He exhibits no distension. There is no tenderness.   Musculoskeletal:        Right foot: There is normal range of motion and no deformity.        Left foot: There is normal range of motion and no deformity.   Feet:   Right Foot:   Protective Sensation: 5 sites tested. 5 sites sensed.   Skin Integrity: Negative for ulcer, blister or skin breakdown.   Left Foot:   Protective Sensation: 5 sites tested. 5 sites sensed.   Skin Integrity: Negative for ulcer, blister or skin breakdown.   Neurological: He is alert. He has normal reflexes.   Skin: Skin is warm and dry.   Psychiatric: He has a normal mood and affect.   Nursing note and vitals reviewed.        Assessment:       1. Type 2 diabetes mellitus without complication, without long-term current use of insulin    2. Hypercholesteremia    3. Essential hypertension           Office Visit on 08/16/2018   Component Date Value Ref Range Status    Hepatitis C Ab 08/16/2018 0.1  0.0 - 0.9 s/co ratio Final    Glucose 08/16/2018 149* 70 - 99 mg/dL Final    BUN, Bld 08/16/2018 24* 8 - 20 mg/dL Final    Creatinine 08/16/2018 0.85  0.60 - 1.40 mg/dL Final    Calcium 08/16/2018 9.8  7.7 - 10.4 mg/dL Final    Sodium 08/16/2018 137  134 - 144 mmol/L Final    Potassium 08/16/2018 4.2  3.5 - 5.0 mmol/L Final    Chloride 08/16/2018 102  98 - 110 mmol/L Final    CO2 08/16/2018 24.1  22.8 - 31.6 mmol/L Final    Albumin 08/16/2018 4.5  3.1 - 4.7 g/dL Final    Total Bilirubin 08/16/2018 0.9  0.3 - 1.0 mg/dL Final    Alkaline Phosphatase 08/16/2018 33* 40 - 104 IU/L Final    Total Protein 08/16/2018 7.3  6.0 - 8.2 g/dL Final    ALT (SGPT) 08/16/2018 32  3 - 33 IU/L Final    AST 08/16/2018 24  10 - 40 IU/L Final    Microalbum.,U,Random 08/16/2018 13.0  0.0 - 19.9 mcg/ml Final    Creatinine, Random Ur  08/16/2018 163.00  mg/dl Final    Microalb Creat Ratio 08/16/2018 8  0 - 30 Final    Hemoglobin A1C 08/16/2018 7.4* 3.1 - 6.5 % Final         Plan:           Type 2 diabetes mellitus without complication, without long-term current use of insulin    Hypercholesteremia    Essential hypertension    Other orders  -     atorvastatin (LIPITOR) 20 MG tablet; Take 1 tablet (20 mg total) by mouth once daily.  Dispense: 90 tablet; Refill: 3  -     fenofibrate 160 MG Tab; TAKE 1 TABLET BY MOUTH AT BEDTIME (NEED  OFFICE  VISIT)  Dispense: 90 tablet; Refill: 3      Advised Mr. Hernandez to monitor Blood sugars at home and record them.  Exercise, watch diet and loose weight.  keep a close eye on feet and keep them clean. Annual eye examination. Annual influenza vaccine.  Monitor HgbA1c every 3 to 6 months. Monitor urine microalbumin every year.keep LDL less than 100. Monitor blood pressure and target blood pressure 120/70.        The patient is asked to make an attempt to improve diet and exercise patterns to aid in medical management of this problem.      I will discontinue pravastatin and changed to atorvastatin. Continue to monitor blood pressures and blood sugars. I will see him back in 3 months time and repeat labs again.    He has been advised to reconsider his decision not to take pneumonia and flu shots because these are important and to protect him against unnecessary infections.          Current Outpatient Medications:     aspirin (ECOTRIN) 81 MG EC tablet, Take 81 mg by mouth once daily., Disp: , Rfl:     fenofibrate 160 MG Tab, TAKE 1 TABLET BY MOUTH AT BEDTIME (NEED  OFFICE  VISIT), Disp: 90 tablet, Rfl: 3    lisinopril (PRINIVIL,ZESTRIL) 20 MG tablet, Take 1 tablet (20 mg total) by mouth once daily., Disp: 90 tablet, Rfl: 1    metFORMIN (GLUCOPHAGE) 850 MG tablet, Take 1 tablet (850 mg total) by mouth 2 (two) times daily with meals., Disp: 180 tablet, Rfl: 1    paroxetine (PAXIL-CR) 25 MG 24 hr tablet, TAKE ONE  TABLET BY MOUTH ONCE DAILY, Disp: 90 tablet, Rfl: 1    atorvastatin (LIPITOR) 20 MG tablet, Take 1 tablet (20 mg total) by mouth once daily., Disp: 90 tablet, Rfl: 3

## 2019-02-19 DIAGNOSIS — I10 ESSENTIAL HYPERTENSION: ICD-10-CM

## 2019-02-19 RX ORDER — LISINOPRIL 20 MG/1
TABLET ORAL
Qty: 90 TABLET | Refills: 1 | Status: SHIPPED | OUTPATIENT
Start: 2019-02-19 | End: 2019-09-03 | Stop reason: SDUPTHER

## 2019-03-07 RX ORDER — METFORMIN HYDROCHLORIDE 850 MG/1
TABLET ORAL
Qty: 180 TABLET | Refills: 0 | Status: SHIPPED | OUTPATIENT
Start: 2019-03-07 | End: 2019-05-23 | Stop reason: SDUPTHER

## 2019-03-11 RX ORDER — PAROXETINE HYDROCHLORIDE HEMIHYDRATE 25 MG/1
TABLET, FILM COATED, EXTENDED RELEASE ORAL
Qty: 90 TABLET | Refills: 0 | Status: SHIPPED | OUTPATIENT
Start: 2019-03-11 | End: 2019-05-23 | Stop reason: SDUPTHER

## 2019-03-19 ENCOUNTER — OFFICE VISIT (OUTPATIENT)
Dept: PODIATRY | Facility: CLINIC | Age: 64
End: 2019-03-19
Payer: COMMERCIAL

## 2019-03-19 VITALS — SYSTOLIC BLOOD PRESSURE: 116 MMHG | HEART RATE: 76 BPM | DIASTOLIC BLOOD PRESSURE: 77 MMHG | TEMPERATURE: 96 F

## 2019-03-19 DIAGNOSIS — E11.9 TYPE 2 DIABETES MELLITUS WITHOUT COMPLICATION, WITHOUT LONG-TERM CURRENT USE OF INSULIN: Primary | ICD-10-CM

## 2019-03-19 DIAGNOSIS — M20.41 HAMMER TOE OF RIGHT FOOT: ICD-10-CM

## 2019-03-19 DIAGNOSIS — M21.619 BUNION: ICD-10-CM

## 2019-03-19 PROCEDURE — 3078F PR MOST RECENT DIASTOLIC BLOOD PRESSURE < 80 MM HG: ICD-10-PCS | Mod: ,,, | Performed by: PODIATRIST

## 2019-03-19 PROCEDURE — 99203 PR OFFICE/OUTPT VISIT, NEW, LEVL III, 30-44 MIN: ICD-10-PCS | Mod: ,,, | Performed by: PODIATRIST

## 2019-03-19 PROCEDURE — 3045F PR MOST RECENT HEMOGLOBIN A1C LEVEL 7.0-9.0%: CPT | Mod: ,,, | Performed by: PODIATRIST

## 2019-03-19 PROCEDURE — 3074F SYST BP LT 130 MM HG: CPT | Mod: ,,, | Performed by: PODIATRIST

## 2019-03-19 PROCEDURE — 3045F PR MOST RECENT HEMOGLOBIN A1C LEVEL 7.0-9.0%: ICD-10-PCS | Mod: ,,, | Performed by: PODIATRIST

## 2019-03-19 PROCEDURE — 3078F DIAST BP <80 MM HG: CPT | Mod: ,,, | Performed by: PODIATRIST

## 2019-03-19 PROCEDURE — 3074F PR MOST RECENT SYSTOLIC BLOOD PRESSURE < 130 MM HG: ICD-10-PCS | Mod: ,,, | Performed by: PODIATRIST

## 2019-03-19 PROCEDURE — 99203 OFFICE O/P NEW LOW 30 MIN: CPT | Mod: ,,, | Performed by: PODIATRIST

## 2019-03-19 RX ORDER — PRAVASTATIN SODIUM 20 MG/1
TABLET ORAL
COMMUNITY
Start: 2019-03-12 | End: 2019-10-10

## 2019-03-19 NOTE — PROGRESS NOTES
[address] [facility] [address] [place] [address]  Phone: [phone]   [phone]    Patient's PCP:Gregorio Grijalva MD  Referring Provider: No ref. provider found    Subjective:      Chief Complaint:: Diabetic Foot Exam    HPI  Yan Hernandez Jr. is a 63 y.o. male who presents to the clinic for a diabetic foot exam.   Pt has seen Dr. Gregorio Grijalva around November-December 2018 who treats them for their diabetes.  Pt has been a diabetic for 12 years. Taking oral medications to treat diabetes.  Denies calf pain while walking.  Blood sugar: 107  Hemoglobin A1C: does not check        Vitals:    03/19/19 0925   BP: 116/77   Pulse: 76   Temp: (!) 95.7 °F (35.4 °C)     Shoe Size: 10    History reviewed. No pertinent surgical history.  Past Medical History:   Diagnosis Date    Allergy     atorvastatin, Influenza vaccine    Diabetes mellitus     Diabetes mellitus, type 2     GERD (gastroesophageal reflux disease)     Hx of rhinoplasty 10/17/2018    Hyperlipidemia     Hypertension      Family History   Problem Relation Age of Onset    Arthritis Mother     Alzheimer's disease Mother     Cancer Father         Social History:   Marital Status: Single  Alcohol History:  reports that he does not drink alcohol.  Tobacco History:  reports that he has quit smoking. His smoking use included cigarettes. He does not have any smokeless tobacco history on file.  Drug History:  reports that he does not use drugs.    Review of patient's allergies indicates:   Allergen Reactions    Influenza virus vaccines      Gets sick with shots       Current Outpatient Medications   Medication Sig Dispense Refill    aspirin (ECOTRIN) 81 MG EC tablet Take 81 mg by mouth once daily.      fenofibrate 160 MG Tab TAKE 1 TABLET BY MOUTH AT BEDTIME (NEED  OFFICE  VISIT) 90 tablet 3    lisinopril (PRINIVIL,ZESTRIL) 20 MG tablet TAKE 1 TABLET BY MOUTH ONCE DAILY 90 tablet 1    metFORMIN (GLUCOPHAGE) 850 MG tablet TAKE 1 TABLET BY MOUTH TWICE  DAILY WITH MEALS 180 tablet 0    paroxetine (PAXIL-CR) 25 MG 24 hr tablet TAKE 1 TABLET BY MOUTH ONCE DAILY 90 tablet 0    pravastatin (PRAVACHOL) 20 MG tablet       atorvastatin (LIPITOR) 20 MG tablet Take 1 tablet (20 mg total) by mouth once daily. 90 tablet 3     No current facility-administered medications for this visit.        Review of Systems   Constitutional: Negative for chills, fatigue, fever and unexpected weight change.   HENT: Negative for hearing loss and trouble swallowing.    Eyes: Negative for photophobia and visual disturbance.   Respiratory: Negative for cough, shortness of breath and wheezing.    Cardiovascular: Negative for chest pain, palpitations and leg swelling.   Gastrointestinal: Negative for abdominal pain and nausea.   Genitourinary: Negative for dysuria and frequency.   Musculoskeletal: Negative for arthralgias, back pain and joint swelling.   Skin: Negative for rash.   Neurological: Negative for tremors, seizures, weakness, numbness and headaches.   Hematological: Does not bruise/bleed easily.         Objective:        Physical Exam:   Foot Exam    General  General Appearance: appears stated age and healthy   Orientation: alert and oriented to person, place, and time   Affect: appropriate   Gait: unimpaired       Right Foot/Ankle     Inspection and Palpation  Arch: pes planus  Hammertoes: second toe  Hallux valgus: yes  Hallux limitus: yes  Skin Exam: skin intact;     Neurovascular  Dorsalis pedis: 1+  Posterior tibial: 2+  Saphenous nerve sensation: normal  Tibial nerve sensation: normal  Superficial peroneal nerve sensation: normal  Deep peroneal nerve sensation: normal  Sural nerve sensation: normal    Muscle Strength  Ankle dorsiflexion: 5  Ankle plantar flexion: 5  Ankle inversion: 5  Ankle eversion: 5  Great toe extension: 5  Great toe flexion: 5    Range of Motion    Passive  1st MTP flexion: 25    Active  1st MTP extension: 20      Left Foot/Ankle      Inspection and  Palpation  Arch: pes planus  Hammertoes: second toe  Hallux valgus: yes  Hallux limitus: yes  Skin Exam: skin intact;     Neurovascular  Dorsalis pedis: 1+  Posterior tibial: 2+  Saphenous nerve sensation: normal  Tibial nerve sensation: normal  Superficial peroneal nerve sensation: normal  Deep peroneal nerve sensation: normal  Sural nerve sensation: normal    Muscle Strength  Ankle dorsiflexion: 5  Ankle plantar flexion: 5  Ankle inversion: 5  Ankle eversion: 5  Great toe extension: 5  Great toe flexion: 5    Range of Motion    Passive  1st MTP extension: 25    Active  1st MTP extension: 20          Physical Exam   Cardiovascular:   Pulses:       Dorsalis pedis pulses are 1+ on the right side, and 1+ on the left side.        Posterior tibial pulses are 2+ on the right side, and 2+ on the left side.       Imaging:            Assessment:       1. Type 2 diabetes mellitus without complication, without long-term current use of insulin    2. Bunion    3. Hammer toe of right foot      Plan:   Type 2 diabetes mellitus without complication, without long-term current use of insulin    Bunion    Hammer toe of right foot    Counseled patient on the aspects of diabetes and how it pertains to the feet.  I explained the importance of proper diabetic foot care and how it is essential for the health of their feet.    Counseling/Education:  I provided patient education verbally regarding:   The aspects of diabetes and how it pertains to the feet. I explained the importance of proper diabetic foot care and how it is essential for the health of their feet.    I discussed the importance of knowing their Hemoglobin A1c and that the level needs to be as close to 6 as possible. I discussed the increase complications of high blood sugar including stroke, blindness, heart attack, kidney failure and loss of limb secondary to neuropathy and PVD.     With neuropathy, beware of any breaks in the skin or redness. These areas are not recognized  early due to the numbness.    I discussed Diabetes, lower back issues, metabolic disorders, systemic causes, chemotherapy, vitamin deficiency, heavy metal exposure, as some of the causes. I also explained that as much as 40% of the time we can not find a cause. I discussed different treatments available to control the symptoms but which may not cure the problem.                 No Follow-up on file.    Procedures - None    Counseling:     I provided patient education verbally regarding:   Patient diagnosis, treatment options, as well as alternatives, risks, and benefits.     This note was created using Dragon voice recognition software that occasionally misinterpreted phrases or words.

## 2019-05-23 DIAGNOSIS — E78.00 HYPERCHOLESTEREMIA: ICD-10-CM

## 2019-05-23 RX ORDER — PAROXETINE HYDROCHLORIDE HEMIHYDRATE 25 MG/1
TABLET, FILM COATED, EXTENDED RELEASE ORAL
Qty: 90 TABLET | Refills: 0 | Status: SHIPPED | OUTPATIENT
Start: 2019-05-23 | End: 2019-09-13 | Stop reason: SDUPTHER

## 2019-05-23 RX ORDER — METFORMIN HYDROCHLORIDE 850 MG/1
TABLET ORAL
Qty: 180 TABLET | Refills: 0 | Status: SHIPPED | OUTPATIENT
Start: 2019-05-23 | End: 2019-09-13 | Stop reason: SDUPTHER

## 2019-05-23 RX ORDER — PRAVASTATIN SODIUM 20 MG/1
TABLET ORAL
Qty: 90 TABLET | Refills: 1 | Status: SHIPPED | OUTPATIENT
Start: 2019-05-23 | End: 2019-12-16 | Stop reason: SDUPTHER

## 2019-08-07 ENCOUNTER — LAB VISIT (OUTPATIENT)
Dept: LAB | Facility: HOSPITAL | Age: 64
End: 2019-08-07
Attending: INTERNAL MEDICINE
Payer: COMMERCIAL

## 2019-08-07 DIAGNOSIS — R00.2 PALPITATIONS: Primary | ICD-10-CM

## 2019-08-07 DIAGNOSIS — K21.9 ESOPHAGEAL REFLUX: ICD-10-CM

## 2019-08-07 DIAGNOSIS — I10 ESSENTIAL HYPERTENSION, MALIGNANT: ICD-10-CM

## 2019-08-07 DIAGNOSIS — E11.9 DIABETES MELLITUS WITHOUT COMPLICATION: ICD-10-CM

## 2019-08-07 LAB
ALBUMIN SERPL BCP-MCNC: 4.6 G/DL (ref 3.5–5.2)
ALP SERPL-CCNC: 33 U/L (ref 55–135)
ALT SERPL W/O P-5'-P-CCNC: 32 U/L (ref 10–44)
ANION GAP SERPL CALC-SCNC: 6 MMOL/L (ref 8–16)
AST SERPL-CCNC: 24 U/L (ref 10–40)
BASOPHILS # BLD AUTO: 0.04 K/UL (ref 0–0.2)
BASOPHILS NFR BLD: 0.6 % (ref 0–1.9)
BILIRUB SERPL-MCNC: 0.9 MG/DL (ref 0.1–1)
BUN SERPL-MCNC: 22 MG/DL (ref 8–23)
CALCIUM SERPL-MCNC: 9.3 MG/DL (ref 8.7–10.5)
CHLORIDE SERPL-SCNC: 101 MMOL/L (ref 95–110)
CHOLEST SERPL-MCNC: 177 MG/DL (ref 120–199)
CHOLEST/HDLC SERPL: 4.7 {RATIO} (ref 2–5)
CO2 SERPL-SCNC: 27 MMOL/L (ref 23–29)
CREAT SERPL-MCNC: 0.9 MG/DL (ref 0.5–1.4)
DIFFERENTIAL METHOD: ABNORMAL
EOSINOPHIL # BLD AUTO: 0.4 K/UL (ref 0–0.5)
EOSINOPHIL NFR BLD: 6 % (ref 0–8)
ERYTHROCYTE [DISTWIDTH] IN BLOOD BY AUTOMATED COUNT: 12.4 % (ref 11.5–14.5)
EST. GFR  (AFRICAN AMERICAN): >60 ML/MIN/1.73 M^2
EST. GFR  (NON AFRICAN AMERICAN): >60 ML/MIN/1.73 M^2
GLUCOSE SERPL-MCNC: 118 MG/DL (ref 70–110)
HCT VFR BLD AUTO: 41.3 % (ref 40–54)
HDLC SERPL-MCNC: 38 MG/DL (ref 40–75)
HDLC SERPL: 21.5 % (ref 20–50)
HGB BLD-MCNC: 13.9 G/DL (ref 14–18)
IMM GRANULOCYTES # BLD AUTO: 0.03 K/UL (ref 0–0.04)
IMM GRANULOCYTES NFR BLD AUTO: 0.5 % (ref 0–0.5)
LDLC SERPL CALC-MCNC: 106 MG/DL (ref 63–159)
LYMPHOCYTES # BLD AUTO: 1.5 K/UL (ref 1–4.8)
LYMPHOCYTES NFR BLD: 24.3 % (ref 18–48)
MAGNESIUM SERPL-MCNC: 1.8 MG/DL (ref 1.6–2.6)
MCH RBC QN AUTO: 29.7 PG (ref 27–31)
MCHC RBC AUTO-ENTMCNC: 33.7 G/DL (ref 32–36)
MCV RBC AUTO: 88 FL (ref 82–98)
MONOCYTES # BLD AUTO: 0.5 K/UL (ref 0.3–1)
MONOCYTES NFR BLD: 7.8 % (ref 4–15)
NEUTROPHILS # BLD AUTO: 3.8 K/UL (ref 1.8–7.7)
NEUTROPHILS NFR BLD: 60.8 % (ref 38–73)
NONHDLC SERPL-MCNC: 139 MG/DL
NRBC BLD-RTO: 0 /100 WBC
PLATELET # BLD AUTO: 272 K/UL (ref 150–350)
PMV BLD AUTO: 10.1 FL (ref 9.2–12.9)
POTASSIUM SERPL-SCNC: 4.3 MMOL/L (ref 3.5–5.1)
PROT SERPL-MCNC: 7.3 G/DL (ref 6–8.4)
RBC # BLD AUTO: 4.68 M/UL (ref 4.6–6.2)
SODIUM SERPL-SCNC: 134 MMOL/L (ref 136–145)
TRIGL SERPL-MCNC: 165 MG/DL (ref 30–150)
WBC # BLD AUTO: 6.17 K/UL (ref 3.9–12.7)

## 2019-08-07 PROCEDURE — 36415 COLL VENOUS BLD VENIPUNCTURE: CPT

## 2019-08-07 PROCEDURE — 80061 LIPID PANEL: CPT

## 2019-08-07 PROCEDURE — 83735 ASSAY OF MAGNESIUM: CPT

## 2019-08-07 PROCEDURE — 83036 HEMOGLOBIN GLYCOSYLATED A1C: CPT

## 2019-08-07 PROCEDURE — 80053 COMPREHEN METABOLIC PANEL: CPT

## 2019-08-07 PROCEDURE — 85025 COMPLETE CBC W/AUTO DIFF WBC: CPT

## 2019-08-08 LAB
ESTIMATED AVG GLUCOSE: 143 MG/DL (ref 68–131)
HBA1C MFR BLD HPLC: 6.6 % (ref 4.5–6.2)

## 2019-09-03 DIAGNOSIS — I10 ESSENTIAL HYPERTENSION: ICD-10-CM

## 2019-09-04 RX ORDER — LISINOPRIL 20 MG/1
TABLET ORAL
Qty: 30 TABLET | Refills: 1 | Status: SHIPPED | OUTPATIENT
Start: 2019-09-04 | End: 2019-11-05 | Stop reason: SDUPTHER

## 2019-09-13 RX ORDER — PAROXETINE HYDROCHLORIDE HEMIHYDRATE 25 MG/1
TABLET, FILM COATED, EXTENDED RELEASE ORAL
Qty: 90 TABLET | Refills: 0 | Status: SHIPPED | OUTPATIENT
Start: 2019-09-13 | End: 2019-12-16 | Stop reason: SDUPTHER

## 2019-09-13 RX ORDER — METFORMIN HYDROCHLORIDE 850 MG/1
TABLET ORAL
Qty: 180 TABLET | Refills: 0 | Status: SHIPPED | OUTPATIENT
Start: 2019-09-13 | End: 2019-09-17 | Stop reason: SDUPTHER

## 2019-09-17 RX ORDER — METFORMIN HYDROCHLORIDE 850 MG/1
850 TABLET ORAL 2 TIMES DAILY WITH MEALS
Qty: 180 TABLET | Refills: 0 | Status: SHIPPED | OUTPATIENT
Start: 2019-09-17 | End: 2019-12-07 | Stop reason: SDUPTHER

## 2019-10-10 ENCOUNTER — OFFICE VISIT (OUTPATIENT)
Dept: FAMILY MEDICINE | Facility: CLINIC | Age: 64
End: 2019-10-10
Payer: MEDICARE

## 2019-10-10 VITALS
HEIGHT: 67 IN | SYSTOLIC BLOOD PRESSURE: 126 MMHG | HEART RATE: 66 BPM | BODY MASS INDEX: 28.25 KG/M2 | DIASTOLIC BLOOD PRESSURE: 79 MMHG | WEIGHT: 180 LBS

## 2019-10-10 DIAGNOSIS — E78.00 HYPERCHOLESTEREMIA: ICD-10-CM

## 2019-10-10 DIAGNOSIS — I10 ESSENTIAL HYPERTENSION: ICD-10-CM

## 2019-10-10 DIAGNOSIS — E11.9 TYPE 2 DIABETES MELLITUS WITHOUT COMPLICATION, WITHOUT LONG-TERM CURRENT USE OF INSULIN: Primary | ICD-10-CM

## 2019-10-10 DIAGNOSIS — K21.9 GASTROESOPHAGEAL REFLUX DISEASE WITHOUT ESOPHAGITIS: ICD-10-CM

## 2019-10-10 PROCEDURE — 3008F PR BODY MASS INDEX (BMI) DOCUMENTED: ICD-10-PCS | Mod: S$GLB,,, | Performed by: INTERNAL MEDICINE

## 2019-10-10 PROCEDURE — 3074F PR MOST RECENT SYSTOLIC BLOOD PRESSURE < 130 MM HG: ICD-10-PCS | Mod: S$GLB,,, | Performed by: INTERNAL MEDICINE

## 2019-10-10 PROCEDURE — 3044F PR MOST RECENT HEMOGLOBIN A1C LEVEL <7.0%: ICD-10-PCS | Mod: S$GLB,,, | Performed by: INTERNAL MEDICINE

## 2019-10-10 PROCEDURE — 99214 OFFICE O/P EST MOD 30 MIN: CPT | Mod: S$GLB,,, | Performed by: INTERNAL MEDICINE

## 2019-10-10 PROCEDURE — 3044F HG A1C LEVEL LT 7.0%: CPT | Mod: S$GLB,,, | Performed by: INTERNAL MEDICINE

## 2019-10-10 PROCEDURE — 3078F DIAST BP <80 MM HG: CPT | Mod: S$GLB,,, | Performed by: INTERNAL MEDICINE

## 2019-10-10 PROCEDURE — 3074F SYST BP LT 130 MM HG: CPT | Mod: S$GLB,,, | Performed by: INTERNAL MEDICINE

## 2019-10-10 PROCEDURE — 3078F PR MOST RECENT DIASTOLIC BLOOD PRESSURE < 80 MM HG: ICD-10-PCS | Mod: S$GLB,,, | Performed by: INTERNAL MEDICINE

## 2019-10-10 PROCEDURE — 1101F PT FALLS ASSESS-DOCD LE1/YR: CPT | Mod: S$GLB,,, | Performed by: INTERNAL MEDICINE

## 2019-10-10 PROCEDURE — 99214 PR OFFICE/OUTPT VISIT, EST, LEVL IV, 30-39 MIN: ICD-10-PCS | Mod: S$GLB,,, | Performed by: INTERNAL MEDICINE

## 2019-10-10 PROCEDURE — 1101F PR PT FALLS ASSESS DOC 0-1 FALLS W/OUT INJ PAST YR: ICD-10-PCS | Mod: S$GLB,,, | Performed by: INTERNAL MEDICINE

## 2019-10-10 PROCEDURE — 3008F BODY MASS INDEX DOCD: CPT | Mod: S$GLB,,, | Performed by: INTERNAL MEDICINE

## 2019-10-10 NOTE — PATIENT INSTRUCTIONS
Diabetes: Activity Tips    Being more active can help you manage your diabetes. The tips on this sheet can help you get the most from your exercise. They can also help you stay safe.  Staying Active  Its important for adults to spend less time sitting and being inactive. This is especially true if you have type 2 diabetes. When you are sitting for long periods of time, get up for short sessions of light activity every 30 minutes.  You should aim for at least 150 minutes a week of exercise or physical activity. Dont let more than 2 days go by without being active.  Benefit from briskness  Brisk activity gets your heart beating faster. This can help you increase your fitness, lose extra weight, and manage your blood sugar level. Try brisk walking. Or, if you have foot or leg problems, you can try swimming or bike riding. You can break up your exercise into chunks throughout the day. Work up to at least 30 minutes of steady, brisk exercise on most days.  Warm up and cool down  Warming up and cooling down reduce your risk of injury. They also help limit muscle soreness. Do a mild version of your activity for 5 minutes before and after your routine. You can also learn stretches that will help keep your muscles loose. Your healthcare provider may show you good ways to warm up and stretch.  Do the talk-sing test  The talk-sing test is a simple way to tell how hard youre exercising. If you can talk while exercising, youre in a safe range. If youre out of breath, slow down. If you can carry a tune, its time to  the pace. Walk up a hill. Increase the resistance on your stationary bike. Or swim faster.  What about eating?  You may be told to plan your exercise for 1 to 2 hours after a meal. In most cases, you dont need to eat while being active. If you take insulin or medicine that can cause low blood sugar, test your blood sugar before exercising. And carry a fast-acting sugar that will raise your blood sugar  level quickly. This includes glucose tablets or hard candy. Use it if you feel low blood sugar symptoms.  Safety tips  These tips can help you stay safe as you become fit:  · Exercise with a friend or carry a cell phone if you have one.  · Carry or wear identification, such as a necklace or bracelet, that says you have diabetes.  · Use the proper footwear and safety equipment for your activity.  · Drink water before, during, and after exercise.  · Dress properly for the weather.  · Dont exercise in very hot or very cold weather.  · Dont exercise if you are sick.  · If you are instructed to do so, test your blood sugar before and after you exercise. Have a small carbohydrate snack if your blood sugar is low before you start exercising.   When to stop exercising and call your healthcare provider  Stop exercising and call your healthcare provider right away if you notice any of the following:  · Pain, pressure, tightness, or heaviness in the chest  · Pain or heaviness in the neck, shoulders, back, arms, legs, or feet  · Unusual shortness of breath  · Dizziness or lightheadedness  · Unusually rapid or slow pulse  · Increased joint or muscle pain  · Nausea or vomiting  Date Last Reviewed: 5/1/2016  © 3459-9975 [employer]. [address] [place] [address], [place] [address]. All rights reserved. This information is not intended as a substitute for professional medical care. Always follow your healthcare professional's instructions.        Medicines for Acid Reflux  Your healthcare provider has told you that you have acid reflux. This condition causes stomach acid to wash up into your throat. For most people, acid reflux is troubling but not dangerous. But left untreated, acid reflux sometimes damages the esophagus. Medicines can help control acid reflux and limit your risk of future problems.  Medicines for acid reflux  Your healthcare provider may prescribe medicine to help treat your acid reflux. Medicine will be  based on your symptoms and any test results. Your provider will explain how to take your medicine. You will also be told about possible side effects.  Reducing stomach acid  Your provider may suggest antacids that you can buy over the counter. Antacids can give fast relief. Or you may be told to take a type of medicine called H2 blockers. These are available over the counter and by prescription (for higher doses).  Blocking stomach acid  In more severe cases, your healthcare provider may suggest stronger medicines such as proton pump inhibitors (PPIs). These keep the stomach from making acid. They are often prescribed for long-term use.  Other medicines  In some cases medicines to reduce or block stomach acid may not work. Then you may be switched to another type of medicine that helps your stomach empty better.     Date Last Reviewed: 10/1/2016  © 9846-6879 [employer]. [address] [place] [address], [place] [address]. All rights reserved. This information is not intended as a substitute for professional medical care. Always follow your healthcare professional's instructions.

## 2019-10-10 NOTE — PROGRESS NOTES
Subjective:       Patient ID: Yan Hernandez is a 64 y.o. male.    Chief Complaint: Diabetes; Hypertension; and Hyperlipidemia    Mr Hernandez is a 64 Y male who comes for follow-up. He has underlying diabetes, hypertension and dyslipidemia. He states that he is doing okay with his blood sugars and blood pressures. No chest pains. No shortness of breath.    He is still actively working for [employer]. He is very active and if he had his day he would have continued to work for another 35 years.     That he has turned 64, he is eligible for [employer] managed Medicare.      Recent labs have been better.    Diabetes   He presents for his follow-up diabetic visit. He has type 2 diabetes mellitus. His disease course has been stable. Pertinent negatives for hypoglycemia include no confusion, dizziness, mood changes, nervousness/anxiousness, pallor, seizures, sleepiness or speech difficulty. Pertinent negatives for diabetes include no chest pain, no fatigue, no foot ulcerations, no polydipsia, no polyphagia, no polyuria, no weakness and no weight loss. Symptoms are stable. Pertinent negatives for diabetic complications include no CVA, peripheral neuropathy, PVD or retinopathy. Risk factors for coronary artery disease include hypertension, male sex, dyslipidemia and diabetes mellitus. Current diabetic treatment includes oral agent (monotherapy). He is compliant with treatment some of the time. Meal planning includes avoidance of concentrated sweets. He has not had a previous visit with a dietitian. His home blood glucose trend is fluctuating minimally. His breakfast blood glucose range is generally 130-140 mg/dl. An ACE inhibitor/angiotensin II receptor blocker is being taken. He does not see a podiatrist.  Hypertension   This is a chronic problem. The current episode started more than 1 year ago. The problem is controlled. Pertinent negatives include no chest pain, palpitations or shortness of breath. Risk factors  for coronary artery disease include male gender, diabetes mellitus and dyslipidemia. Past treatments include ACE inhibitors. The current treatment provides moderate improvement. There is no history of CVA, PVD or retinopathy. There is no history of chronic renal disease, coarctation of the aorta, hyperaldosteronism, pheochromocytoma or renovascular disease.   Hyperlipidemia   This is a chronic problem. The current episode started more than 1 year ago. The problem is controlled. Exacerbating diseases include diabetes. He has no history of chronic renal disease, hypothyroidism, liver disease, obesity or nephrotic syndrome. Pertinent negatives include no chest pain or shortness of breath. Current antihyperlipidemic treatment includes statins. The current treatment provides moderate improvement of lipids. Compliance problems include psychosocial issues.  Risk factors for coronary artery disease include hypertension, male sex, diabetes mellitus, dyslipidemia and obesity.   Gastroesophageal Reflux   He reports no abdominal pain, no chest pain or no coughing. This is a recurrent problem. The problem occurs occasionally. The problem has been gradually improving. Pertinent negatives include no fatigue or weight loss. He has tried nothing for the symptoms. The treatment provided moderate relief.       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     Employer: [employer]   Social Needs    Financial resource strain: Not on file    Food insecurity:     Worry:  Not on file     Inability: Not on file    Transportation needs:     Medical: Not on file     Non-medical: Not on file   Tobacco Use    Smoking status: Former Smoker     Types: Cigarettes   Substance and Sexual Activity    Alcohol use: No    Drug use: No    Sexual activity: Never   Lifestyle    Physical activity:     Days per week: Not on file     Minutes per session: Not on file    Stress: To some extent   Relationships    Social connections:     Talks on phone: Not on file     Gets together: Not on file     Attends Sikh service: Not on file     Active member of club or organization: Not on file     Attends meetings of clubs or organizations: Not on file     Relationship status: Not on file   Other Topics Concern    Not on file   Social History Narrative    Not on file     History reviewed. No pertinent surgical history.  Family History   Problem Relation Age of Onset    Arthritis Mother     Alzheimer's disease Mother     Cancer Father        Review of Systems   Constitutional: Negative for activity change, appetite change, fatigue, unexpected weight change (lost 3 lbs) and weight loss.   HENT: Negative for congestion, sneezing and trouble swallowing.    Eyes: Negative for pain and visual disturbance.   Respiratory: Negative for cough, chest tightness and shortness of breath.    Cardiovascular: Negative for chest pain, palpitations and leg swelling.        Hypertension stable.   Gastrointestinal: Negative for abdominal distention, abdominal pain, blood in stool, constipation and diarrhea.        Gastroesophageal reflux with LA grade 1 esophagitis.  Currently not on any medications.  Watching his diet.   Endocrine: Negative for cold intolerance, heat intolerance, polydipsia, polyphagia and polyuria.        Diabetes mellitus type2.. Hyperlipidemia.   Genitourinary: Negative for dysuria, hematuria and scrotal swelling.   Musculoskeletal: Negative for arthralgias, back pain and gait problem.   Skin:  "Negative for pallor, rash and wound.   Allergic/Immunologic: Negative for environmental allergies, food allergies and immunocompromised state.   Neurological: Negative for dizziness, seizures, speech difficulty, weakness, light-headedness and numbness.   Hematological: Negative for adenopathy. Does not bruise/bleed easily.   Psychiatric/Behavioral: Negative for agitation, behavioral problems and confusion. The patient is not nervous/anxious.         History of anxiety stable on Paxil.         Objective:      Blood pressure 126/79, pulse 66, height 5' 7" (1.702 m), weight 81.6 kg (180 lb). Body mass index is 28.19 kg/m².  Physical Exam   Constitutional: He appears well-developed.   HENT:   Head: Normocephalic and atraumatic.   Nose: Nose normal.   Mouth/Throat: Oropharynx is clear and moist. No oropharyngeal exudate.   Eyes: Conjunctivae and EOM are normal.   Neck: Normal range of motion. Neck supple. No JVD present. No tracheal deviation present. No thyromegaly present.   Cardiovascular: Normal rate, regular rhythm and normal heart sounds. Exam reveals no gallop and no friction rub.   No murmur heard.  Pulmonary/Chest: Effort normal and breath sounds normal. No respiratory distress. He has no wheezes. He has no rales.   Abdominal: Soft. Bowel sounds are normal. He exhibits no distension. There is no tenderness.   Musculoskeletal:        Right foot: There is normal range of motion and no deformity.        Left foot: There is normal range of motion and no deformity.   Feet:   Right Foot:   Protective Sensation: 5 sites tested. 5 sites sensed.   Skin Integrity: Negative for ulcer, blister, skin breakdown, erythema, warmth, callus or dry skin.   Left Foot:   Protective Sensation: 5 sites tested. 4 sites sensed.   Skin Integrity: Negative for ulcer, blister, skin breakdown, erythema, warmth, callus or dry skin.   Neurological: He is alert. He has normal reflexes.   Skin: Skin is warm and dry.   Psychiatric: He has a " normal mood and affect.   Nursing note and vitals reviewed.        Assessment:       1. Type 2 diabetes mellitus without complication, without long-term current use of insulin    2. Hypercholesteremia    3. Essential hypertension    4. Gastroesophageal reflux disease without esophagitis           Lab Visit on 08/07/2019   Component Date Value Ref Range Status    Sodium 08/07/2019 134* 136 - 145 mmol/L Final    Potassium 08/07/2019 4.3  3.5 - 5.1 mmol/L Final    Chloride 08/07/2019 101  95 - 110 mmol/L Final    CO2 08/07/2019 27  23 - 29 mmol/L Final    Glucose 08/07/2019 118* 70 - 110 mg/dL Final    BUN, Bld 08/07/2019 22  8 - 23 mg/dL Final    Creatinine 08/07/2019 0.9  0.5 - 1.4 mg/dL Final    Calcium 08/07/2019 9.3  8.7 - 10.5 mg/dL Final    Total Protein 08/07/2019 7.3  6.0 - 8.4 g/dL Final    Albumin 08/07/2019 4.6  3.5 - 5.2 g/dL Final    Total Bilirubin 08/07/2019 0.9  0.1 - 1.0 mg/dL Final    Alkaline Phosphatase 08/07/2019 33* 55 - 135 U/L Final    AST 08/07/2019 24  10 - 40 U/L Final    ALT 08/07/2019 32  10 - 44 U/L Final    Anion Gap 08/07/2019 6* 8 - 16 mmol/L Final    eGFR if  08/07/2019 >60.0  >60 mL/min/1.73 m^2 Final    eGFR if non African American 08/07/2019 >60.0  >60 mL/min/1.73 m^2 Final    Cholesterol 08/07/2019 177  120 - 199 mg/dL Final    Triglycerides 08/07/2019 165* 30 - 150 mg/dL Final    HDL 08/07/2019 38* 40 - 75 mg/dL Final    LDL Cholesterol 08/07/2019 106.0  63.0 - 159.0 mg/dL Final    Hdl/Cholesterol Ratio 08/07/2019 21.5  20.0 - 50.0 % Final    Total Cholesterol/HDL Ratio 08/07/2019 4.7  2.0 - 5.0 Final    Non-HDL Cholesterol 08/07/2019 139  mg/dL Final    WBC 08/07/2019 6.17  3.90 - 12.70 K/uL Final    RBC 08/07/2019 4.68  4.60 - 6.20 M/uL Final    Hemoglobin 08/07/2019 13.9* 14.0 - 18.0 g/dL Final    Hematocrit 08/07/2019 41.3  40.0 - 54.0 % Final    Mean Corpuscular Volume 08/07/2019 88  82 - 98 fL Final    Mean Corpuscular  Hemoglobin 08/07/2019 29.7  27.0 - 31.0 pg Final    Mean Corpuscular Hemoglobin Conc 08/07/2019 33.7  32.0 - 36.0 g/dL Final    RDW 08/07/2019 12.4  11.5 - 14.5 % Final    Platelets 08/07/2019 272  150 - 350 K/uL Final    MPV 08/07/2019 10.1  9.2 - 12.9 fL Final    Immature Granulocytes 08/07/2019 0.5  0.0 - 0.5 % Final    Gran # (ANC) 08/07/2019 3.8  1.8 - 7.7 K/uL Final    Immature Grans (Abs) 08/07/2019 0.03  0.00 - 0.04 K/uL Final    Lymph # 08/07/2019 1.5  1.0 - 4.8 K/uL Final    Mono # 08/07/2019 0.5  0.3 - 1.0 K/uL Final    Eos # 08/07/2019 0.4  0.0 - 0.5 K/uL Final    Baso # 08/07/2019 0.04  0.00 - 0.20 K/uL Final    nRBC 08/07/2019 0  0 /100 WBC Final    Gran% 08/07/2019 60.8  38.0 - 73.0 % Final    Lymph% 08/07/2019 24.3  18.0 - 48.0 % Final    Mono% 08/07/2019 7.8  4.0 - 15.0 % Final    Eosinophil% 08/07/2019 6.0  0.0 - 8.0 % Final    Basophil% 08/07/2019 0.6  0.0 - 1.9 % Final    Differential Method 08/07/2019 Automated   Final    Hemoglobin A1C 08/07/2019 6.6* 4.5 - 6.2 % Final    Estimated Avg Glucose 08/07/2019 143* 68 - 131 mg/dL Final    Magnesium 08/07/2019 1.8  1.6 - 2.6 mg/dL Final         Plan:           Type 2 diabetes mellitus without complication, without long-term current use of insulin  -     Hemoglobin A1c; Future; Expected date: 03/02/2020  -     Microalbumin/creatinine urine ratio; Future; Expected date: 03/02/2020    Hypercholesteremia    Essential hypertension    Gastroesophageal reflux disease without esophagitis      Advised Mr. Hernandez to monitor Blood sugars at home and record them.  Exercise, watch diet and loose weight.  keep a close eye on feet and keep them clean. Annual eye examination. Annual influenza vaccine.  Monitor HgbA1c every 3 to 6 months. Monitor urine microalbumin every year.keep LDL less than 100. Monitor blood pressure and target blood pressure 120/70.  The patient is asked to make an attempt to improve diet and exercise patterns to aid in  medical management of this problem.    Advised Mr. Hernandez for Anti reflux measures like small feequent meals, avoid spicy and greasy food. Head end up at night.       I will see him back in 4 months time and repeat labs again.    He has been advised to reconsider his decision not to take pneumonia and flu shots because these are important and to protect him against unnecessary infections.    Spent cheli 25 minutes with patient which involved review of pts medical conditions, labs, medications and with 50% of time face-to-face discussion about medical problems, management and any applicable changes.          Current Outpatient Medications:     aspirin (ECOTRIN) 81 MG EC tablet, Take 81 mg by mouth once daily., Disp: , Rfl:     fenofibrate 160 MG Tab, TAKE 1 TABLET BY MOUTH AT BEDTIME (NEED  OFFICE  VISIT), Disp: 90 tablet, Rfl: 3    lisinopril (PRINIVIL,ZESTRIL) 20 MG tablet, TAKE 1 TABLET BY MOUTH ONCE DAILY, Disp: 30 tablet, Rfl: 1    metFORMIN (GLUCOPHAGE) 850 MG tablet, Take 1 tablet (850 mg total) by mouth 2 (two) times daily with meals., Disp: 180 tablet, Rfl: 0    paroxetine (PAXIL-CR) 25 MG 24 hr tablet, TAKE 1 TABLET BY MOUTH ONCE DAILY (NEED  APPT), Disp: 90 tablet, Rfl: 0    pravastatin (PRAVACHOL) 20 MG tablet, TAKE 1 TABLET BY MOUTH IN THE EVENING, Disp: 90 tablet, Rfl: 1

## 2019-11-05 DIAGNOSIS — I10 ESSENTIAL HYPERTENSION: ICD-10-CM

## 2019-11-05 RX ORDER — LISINOPRIL 20 MG/1
TABLET ORAL
Qty: 90 TABLET | Refills: 1 | Status: SHIPPED | OUTPATIENT
Start: 2019-11-05 | End: 2020-05-11

## 2019-12-04 RX ORDER — FENOFIBRATE 160 MG/1
TABLET ORAL
Qty: 90 TABLET | Refills: 3 | Status: SHIPPED | OUTPATIENT
Start: 2019-12-04 | End: 2020-12-21

## 2019-12-07 DIAGNOSIS — E11.9 TYPE 2 DIABETES MELLITUS WITHOUT COMPLICATION, WITHOUT LONG-TERM CURRENT USE OF INSULIN: Primary | ICD-10-CM

## 2019-12-07 RX ORDER — METFORMIN HYDROCHLORIDE 850 MG/1
TABLET ORAL
Qty: 180 TABLET | Refills: 1 | Status: SHIPPED | OUTPATIENT
Start: 2019-12-07 | End: 2020-06-17

## 2019-12-16 DIAGNOSIS — E78.00 HYPERCHOLESTEREMIA: ICD-10-CM

## 2019-12-16 RX ORDER — PAROXETINE HYDROCHLORIDE HEMIHYDRATE 25 MG/1
TABLET, FILM COATED, EXTENDED RELEASE ORAL
Qty: 90 TABLET | Refills: 1 | Status: SHIPPED | OUTPATIENT
Start: 2019-12-16 | End: 2020-06-17

## 2019-12-16 RX ORDER — PRAVASTATIN SODIUM 20 MG/1
TABLET ORAL
Qty: 90 TABLET | Refills: 1 | Status: SHIPPED | OUTPATIENT
Start: 2019-12-16 | End: 2020-06-17

## 2020-02-20 PROBLEM — K20.0 EOSINOPHILIC ESOPHAGITIS: Status: ACTIVE | Noted: 2020-02-18

## 2020-03-30 ENCOUNTER — TELEPHONE (OUTPATIENT)
Dept: FAMILY MEDICINE | Facility: CLINIC | Age: 65
End: 2020-03-30

## 2020-05-11 DIAGNOSIS — I10 ESSENTIAL HYPERTENSION: ICD-10-CM

## 2020-05-11 RX ORDER — LISINOPRIL 20 MG/1
TABLET ORAL
Qty: 90 TABLET | Refills: 0 | Status: SHIPPED | OUTPATIENT
Start: 2020-05-11 | End: 2020-05-12

## 2020-05-12 DIAGNOSIS — I10 ESSENTIAL HYPERTENSION: ICD-10-CM

## 2020-05-12 RX ORDER — LISINOPRIL 20 MG/1
TABLET ORAL
Qty: 90 TABLET | Refills: 0 | Status: SHIPPED | OUTPATIENT
Start: 2020-05-12 | End: 2020-11-12

## 2020-06-19 ENCOUNTER — OFFICE VISIT (OUTPATIENT)
Dept: FAMILY MEDICINE | Facility: CLINIC | Age: 65
End: 2020-06-19
Payer: MEDICARE

## 2020-06-19 VITALS
HEIGHT: 67 IN | DIASTOLIC BLOOD PRESSURE: 71 MMHG | SYSTOLIC BLOOD PRESSURE: 128 MMHG | WEIGHT: 183 LBS | HEART RATE: 75 BPM | BODY MASS INDEX: 28.72 KG/M2

## 2020-06-19 DIAGNOSIS — I10 ESSENTIAL HYPERTENSION: Chronic | ICD-10-CM

## 2020-06-19 DIAGNOSIS — Z01.818 PREOPERATIVE GENERAL PHYSICAL EXAMINATION: Primary | ICD-10-CM

## 2020-06-19 DIAGNOSIS — E78.00 HYPERCHOLESTEREMIA: Chronic | ICD-10-CM

## 2020-06-19 DIAGNOSIS — E11.9 TYPE 2 DIABETES MELLITUS WITHOUT COMPLICATION, WITHOUT LONG-TERM CURRENT USE OF INSULIN: Chronic | ICD-10-CM

## 2020-06-19 DIAGNOSIS — K21.9 GASTROESOPHAGEAL REFLUX DISEASE WITHOUT ESOPHAGITIS: ICD-10-CM

## 2020-06-19 PROCEDURE — 1126F AMNT PAIN NOTED NONE PRSNT: CPT | Mod: S$GLB,,, | Performed by: INTERNAL MEDICINE

## 2020-06-19 PROCEDURE — 3044F PR MOST RECENT HEMOGLOBIN A1C LEVEL <7.0%: ICD-10-PCS | Mod: S$GLB,,, | Performed by: INTERNAL MEDICINE

## 2020-06-19 PROCEDURE — 3008F BODY MASS INDEX DOCD: CPT | Mod: S$GLB,,, | Performed by: INTERNAL MEDICINE

## 2020-06-19 PROCEDURE — 1170F FXNL STATUS ASSESSED: CPT | Mod: S$GLB,,, | Performed by: INTERNAL MEDICINE

## 2020-06-19 PROCEDURE — 1126F PR PAIN SEVERITY QUANTIFIED, NO PAIN PRESENT: ICD-10-PCS | Mod: S$GLB,,, | Performed by: INTERNAL MEDICINE

## 2020-06-19 PROCEDURE — 3078F PR MOST RECENT DIASTOLIC BLOOD PRESSURE < 80 MM HG: ICD-10-PCS | Mod: S$GLB,,, | Performed by: INTERNAL MEDICINE

## 2020-06-19 PROCEDURE — 1159F PR MEDICATION LIST DOCUMENTED IN MEDICAL RECORD: ICD-10-PCS | Mod: S$GLB,,, | Performed by: INTERNAL MEDICINE

## 2020-06-19 PROCEDURE — 99214 PR OFFICE/OUTPT VISIT, EST, LEVL IV, 30-39 MIN: ICD-10-PCS | Mod: S$GLB,,, | Performed by: INTERNAL MEDICINE

## 2020-06-19 PROCEDURE — 3008F PR BODY MASS INDEX (BMI) DOCUMENTED: ICD-10-PCS | Mod: S$GLB,,, | Performed by: INTERNAL MEDICINE

## 2020-06-19 PROCEDURE — 1101F PR PT FALLS ASSESS DOC 0-1 FALLS W/OUT INJ PAST YR: ICD-10-PCS | Mod: S$GLB,,, | Performed by: INTERNAL MEDICINE

## 2020-06-19 PROCEDURE — 3044F HG A1C LEVEL LT 7.0%: CPT | Mod: S$GLB,,, | Performed by: INTERNAL MEDICINE

## 2020-06-19 PROCEDURE — 99214 OFFICE O/P EST MOD 30 MIN: CPT | Mod: S$GLB,,, | Performed by: INTERNAL MEDICINE

## 2020-06-19 PROCEDURE — 1101F PT FALLS ASSESS-DOCD LE1/YR: CPT | Mod: S$GLB,,, | Performed by: INTERNAL MEDICINE

## 2020-06-19 PROCEDURE — 3074F PR MOST RECENT SYSTOLIC BLOOD PRESSURE < 130 MM HG: ICD-10-PCS | Mod: S$GLB,,, | Performed by: INTERNAL MEDICINE

## 2020-06-19 PROCEDURE — 1170F PR FUNCTIONAL STATUS ASSESSED: ICD-10-PCS | Mod: S$GLB,,, | Performed by: INTERNAL MEDICINE

## 2020-06-19 PROCEDURE — 3078F DIAST BP <80 MM HG: CPT | Mod: S$GLB,,, | Performed by: INTERNAL MEDICINE

## 2020-06-19 PROCEDURE — 3074F SYST BP LT 130 MM HG: CPT | Mod: S$GLB,,, | Performed by: INTERNAL MEDICINE

## 2020-06-19 PROCEDURE — 1159F MED LIST DOCD IN RCRD: CPT | Mod: S$GLB,,, | Performed by: INTERNAL MEDICINE

## 2020-06-19 NOTE — PROGRESS NOTES
Subjective:       Patient ID: Yan Hernandez Jr. is a 65 y.o. male.    Chief Complaint: Pre-op Exam (Left shoulder surg.-- Dr Campos), Hypertension, Hyperlipidemia, and Diabetes    Mr Hernandez is a 64 Y male who comes for follow-up. He has underlying diabetes, hypertension and dyslipidemia.      He will be scheduled for left shoulder shoulder rotator cuff repair by Dr. Lauri Campos in [place].      Medical clearance is sought for proposed surgery as mentioned above.  This will be likely done under general anesthesia.  I anticipate that the blood loss might be minimal.  He will also get a cardiology clearance from Dr. Plasencia.     He states that he is doing okay with his blood sugars and blood pressures. No chest pains. No shortness of breath.    He is still actively working for [employer]. He is very active and if he had his day he would have continued to work for another 35 years.     That he has turned 64, he is eligible for [employer] managed Medicare.      Recent labs have been better.    Hypertension  This is a chronic problem. The current episode started more than 1 year ago. The problem is controlled. Pertinent negatives include no chest pain, palpitations or shortness of breath. Risk factors for coronary artery disease include male gender, diabetes mellitus and dyslipidemia. Past treatments include ACE inhibitors. The current treatment provides moderate improvement. There is no history of CVA, PVD or retinopathy. There is no history of chronic renal disease, coarctation of the aorta, hyperaldosteronism, pheochromocytoma or renovascular disease.   Hyperlipidemia  This is a chronic problem. The current episode started more than 1 year ago. The problem is controlled. Exacerbating diseases include diabetes. He has no history of chronic renal disease, hypothyroidism, liver disease, obesity or nephrotic syndrome. Pertinent negatives include no chest pain or shortness of breath. Current antihyperlipidemic  treatment includes statins. The current treatment provides moderate improvement of lipids. Compliance problems include psychosocial issues.  Risk factors for coronary artery disease include hypertension, male sex, diabetes mellitus, dyslipidemia and obesity.   Diabetes  He presents for his follow-up diabetic visit. He has type 2 diabetes mellitus. His disease course has been stable. Pertinent negatives for hypoglycemia include no confusion, dizziness, mood changes, nervousness/anxiousness, pallor, seizures, sleepiness or speech difficulty. Pertinent negatives for diabetes include no chest pain, no fatigue, no foot ulcerations, no polydipsia, no polyphagia, no polyuria, no weakness and no weight loss. Symptoms are stable. Pertinent negatives for diabetic complications include no CVA, peripheral neuropathy, PVD or retinopathy. Risk factors for coronary artery disease include hypertension, male sex, dyslipidemia and diabetes mellitus. Current diabetic treatment includes oral agent (monotherapy). He is compliant with treatment some of the time. Meal planning includes avoidance of concentrated sweets. He has not had a previous visit with a dietitian. His home blood glucose trend is fluctuating minimally. His breakfast blood glucose range is generally 130-140 mg/dl. An ACE inhibitor/angiotensin II receptor blocker is being taken. He does not see a podiatrist.  Gastroesophageal Reflux  He reports no abdominal pain, no chest pain or no coughing. This is a recurrent problem. The problem occurs occasionally. The problem has been gradually improving. Pertinent negatives include no fatigue or weight loss. He has tried nothing for the symptoms. The treatment provided moderate relief.       Past Medical History:   Diagnosis Date    Allergy     atorvastatin, Influenza vaccine    Diabetes mellitus     Diabetes mellitus, type 2     Eosinophilic esophagitis 2/18/2020    Based upon endoscopy done by Dr. Clark Sprague.    GERD  (gastroesophageal reflux disease)     Hx of rhinoplasty 10/17/2018    Hyperlipidemia     Hypertension      Social History     Socioeconomic History    Marital status: Single     Spouse name: Not on file    Number of children: 0    Years of education: Not on file    Highest education level: Not on file   Occupational History    Occupation: [employer]     Employer: [employer]   Social Needs    Financial resource strain: Not on file    Food insecurity     Worry: Not on file     Inability: Not on file    Transportation needs     Medical: Not on file     Non-medical: Not on file   Tobacco Use    Smoking status: Former Smoker     Types: Cigarettes   Substance and Sexual Activity    Alcohol use: No    Drug use: No    Sexual activity: Not Currently   Lifestyle    Physical activity     Days per week: Not on file     Minutes per session: Not on file    Stress: To some extent   Relationships    Social connections     Talks on phone: Not on file     Gets together: Not on file     Attends [employer] service: Not on file     Active member of club or organization: Not on file     Attends meetings of clubs or organizations: Not on file     Relationship status: Not on file   Other Topics Concern    Not on file   Social History Narrative    Not on file     History reviewed. No pertinent surgical history.  Family History   Problem Relation Age of Onset    Arthritis Mother     Alzheimer's disease Mother     Cancer Father        Review of Systems   Constitutional: Negative for activity change, appetite change, fatigue, unexpected weight change (lost 3 lbs) and weight loss.   HENT: Negative for congestion, sneezing and trouble swallowing.    Eyes: Negative for pain and visual disturbance.   Respiratory: Negative for cough, chest tightness and shortness of breath.    Cardiovascular: Negative for chest pain, palpitations and leg swelling.        Hypertension stable.   Gastrointestinal: Negative for  "abdominal distention, abdominal pain, blood in stool, constipation and diarrhea.        Gastroesophageal reflux with LA grade 1 esophagitis.  Currently not on any medications.  Watching his diet.   Endocrine: Negative for cold intolerance, heat intolerance, polydipsia, polyphagia and polyuria.        Diabetes mellitus type2.. Hyperlipidemia.   Genitourinary: Negative for dysuria, hematuria and scrotal swelling.   Musculoskeletal: Positive for arthralgias (left shoulder pain). Negative for back pain and gait problem.   Skin: Negative for pallor, rash and wound.   Allergic/Immunologic: Negative for environmental allergies, food allergies and immunocompromised state.   Neurological: Negative for dizziness, seizures, speech difficulty, weakness, light-headedness and numbness.   Hematological: Negative for adenopathy. Does not bruise/bleed easily.   Psychiatric/Behavioral: Negative for agitation, behavioral problems and confusion. The patient is not nervous/anxious.         History of anxiety stable on Paxil.         Objective:      Blood pressure 128/71, pulse 75, height 5' 7" (1.702 m), weight 83 kg (183 lb). Body mass index is 28.66 kg/m².  Physical Exam  Vitals signs and nursing note reviewed.   Constitutional:       Appearance: He is well-developed.   HENT:      Head: Normocephalic and atraumatic.      Nose: Nose normal.      Mouth/Throat:      Pharynx: No oropharyngeal exudate.   Eyes:      Conjunctiva/sclera: Conjunctivae normal.   Neck:      Musculoskeletal: Normal range of motion and neck supple. No neck rigidity.      Thyroid: No thyromegaly.      Vascular: No JVD.      Trachea: No tracheal deviation.   Cardiovascular:      Rate and Rhythm: Normal rate and regular rhythm.      Heart sounds: Normal heart sounds. No murmur. No friction rub. No gallop.    Pulmonary:      Effort: Pulmonary effort is normal. No respiratory distress.      Breath sounds: Normal breath sounds. No wheezing or rales.   Abdominal:      " General: Bowel sounds are normal. There is no distension.      Palpations: Abdomen is soft.      Tenderness: There is no abdominal tenderness.   Musculoskeletal:      Right foot: Normal range of motion. No deformity.      Left foot: Normal range of motion. No deformity.      Comments: Left shoulder arthritis and rotator cuff tear.   Feet:      Right foot:      Protective Sensation: 5 sites tested. 5 sites sensed.      Skin integrity: No ulcer, blister, skin breakdown, erythema, warmth, callus or dry skin.      Left foot:      Protective Sensation: 5 sites tested. 5 sites sensed.      Skin integrity: No ulcer, blister, skin breakdown, erythema, warmth, callus or dry skin.   Lymphadenopathy:      Cervical: No cervical adenopathy.   Skin:     General: Skin is warm and dry.   Neurological:      Mental Status: He is alert.      Deep Tendon Reflexes: Reflexes are normal and symmetric.           Assessment:       1. Preoperative general physical examination    2. Type 2 diabetes mellitus without complication, without long-term current use of insulin    3. Hypercholesteremia    4. Essential hypertension    5. Gastroesophageal reflux disease without esophagitis           No visits with results within 3 Month(s) from this visit.   Latest known visit with results is:   Lab Visit on 08/07/2019   Component Date Value Ref Range Status    Sodium 08/07/2019 134* 136 - 145 mmol/L Final    Potassium 08/07/2019 4.3  3.5 - 5.1 mmol/L Final    Chloride 08/07/2019 101  95 - 110 mmol/L Final    CO2 08/07/2019 27  23 - 29 mmol/L Final    Glucose 08/07/2019 118* 70 - 110 mg/dL Final    BUN, Bld 08/07/2019 22  8 - 23 mg/dL Final    Creatinine 08/07/2019 0.9  0.5 - 1.4 mg/dL Final    Calcium 08/07/2019 9.3  8.7 - 10.5 mg/dL Final    Total Protein 08/07/2019 7.3  6.0 - 8.4 g/dL Final    Albumin 08/07/2019 4.6  3.5 - 5.2 g/dL Final    Total Bilirubin 08/07/2019 0.9  0.1 - 1.0 mg/dL Final    Alkaline Phosphatase 08/07/2019 33* 55 -  135 U/L Final    AST 08/07/2019 24  10 - 40 U/L Final    ALT 08/07/2019 32  10 - 44 U/L Final    Anion Gap 08/07/2019 6* 8 - 16 mmol/L Final    eGFR if  08/07/2019 >60.0  >60 mL/min/1.73 m^2 Final    eGFR if non African American 08/07/2019 >60.0  >60 mL/min/1.73 m^2 Final    Cholesterol 08/07/2019 177  120 - 199 mg/dL Final    Triglycerides 08/07/2019 165* 30 - 150 mg/dL Final    HDL 08/07/2019 38* 40 - 75 mg/dL Final    LDL Cholesterol 08/07/2019 106.0  63.0 - 159.0 mg/dL Final    Hdl/Cholesterol Ratio 08/07/2019 21.5  20.0 - 50.0 % Final    Total Cholesterol/HDL Ratio 08/07/2019 4.7  2.0 - 5.0 Final    Non-HDL Cholesterol 08/07/2019 139  mg/dL Final    WBC 08/07/2019 6.17  3.90 - 12.70 K/uL Final    RBC 08/07/2019 4.68  4.60 - 6.20 M/uL Final    Hemoglobin 08/07/2019 13.9* 14.0 - 18.0 g/dL Final    Hematocrit 08/07/2019 41.3  40.0 - 54.0 % Final    Mean Corpuscular Volume 08/07/2019 88  82 - 98 fL Final    Mean Corpuscular Hemoglobin 08/07/2019 29.7  27.0 - 31.0 pg Final    Mean Corpuscular Hemoglobin Conc 08/07/2019 33.7  32.0 - 36.0 g/dL Final    RDW 08/07/2019 12.4  11.5 - 14.5 % Final    Platelets 08/07/2019 272  150 - 350 K/uL Final    MPV 08/07/2019 10.1  9.2 - 12.9 fL Final    Immature Granulocytes 08/07/2019 0.5  0.0 - 0.5 % Final    Gran # (ANC) 08/07/2019 3.8  1.8 - 7.7 K/uL Final    Immature Grans (Abs) 08/07/2019 0.03  0.00 - 0.04 K/uL Final    Lymph # 08/07/2019 1.5  1.0 - 4.8 K/uL Final    Mono # 08/07/2019 0.5  0.3 - 1.0 K/uL Final    Eos # 08/07/2019 0.4  0.0 - 0.5 K/uL Final    Baso # 08/07/2019 0.04  0.00 - 0.20 K/uL Final    nRBC 08/07/2019 0  0 /100 WBC Final    Gran% 08/07/2019 60.8  38.0 - 73.0 % Final    Lymph% 08/07/2019 24.3  18.0 - 48.0 % Final    Mono% 08/07/2019 7.8  4.0 - 15.0 % Final    Eosinophil% 08/07/2019 6.0  0.0 - 8.0 % Final    Basophil% 08/07/2019 0.6  0.0 - 1.9 % Final    Differential Method 08/07/2019 Automated   Final     Hemoglobin A1C 08/07/2019 6.6* 4.5 - 6.2 % Final    Estimated Avg Glucose 08/07/2019 143* 68 - 131 mg/dL Final    Magnesium 08/07/2019 1.8  1.6 - 2.6 mg/dL Final         Plan:           Preoperative general physical examination  Comments:  Left shoulder rotator cuff surgery.  General anesthesia.  Underlying medical issues include diabetes mellitus type 2, hypertension and dyslipidemia    Type 2 diabetes mellitus without complication, without long-term current use of insulin  Comments:  Blood sugars are stable.  No side effects from medications.  Labs pending.  Orders:  -     Hemoglobin A1C; Future; Expected date: 06/19/2020    Hypercholesteremia  Comments:  Patient is taking his medications as prescribed.  No side effects from fenofibrate plus pravastatin combination.  Orders:  -     Lipid Panel; Future; Expected date: 06/19/2020    Essential hypertension  Comments:  Blood pressures are mostly stable.    Gastroesophageal reflux disease without esophagitis     Patient is generally medically stable for proposed left shoulder surgery which may be done under general anesthesia.  It is not anticipated that he will have a major blood loss.    He will be advised to take his blood pressure medication on the day of surgery.  He should avoid taking metformin on the day of surgery.  If aspirin needs to be held, this can be done for 4 or 5 days prior to surgery.    Labs and EKG are pending at the surgery center.  I have added hemoglobin A1c and lipid panel fasting.  He will also see his cardiologist.  Pending review of labs he is medically stable for proposed  shoulder surgery.  Advised Mr. Hernandez to monitor Blood sugars at home and record them.  Exercise, watch diet and loose weight.  keep a close eye on feet and keep them clean. Annual eye examination. Annual influenza vaccine.  Monitor HgbA1c every 3 to 6 months. Monitor urine microalbumin every year.keep LDL less than 100. Monitor blood pressure and target blood pressure  120/70.  The patient is asked to make an attempt to improve diet and exercise patterns to aid in medical management of this problem.    Advised Mr. Hernandez for Anti reflux measures like small feequent meals, avoid spicy and greasy food. Head end up at night.       I will see him back in 4 months time and repeat labs again.    He has been advised to reconsider his decision not to take pneumonia and flu shots because these are important and to protect him against unnecessary infections.    Spent cheli 25 minutes with patient which involved review of pts medical conditions, labs, medications and with 50% of time face-to-face discussion about medical problems, management and any applicable changes.          Current Outpatient Medications:     aspirin (ECOTRIN) 81 MG EC tablet, Take 81 mg by mouth once daily., Disp: , Rfl:     fenofibrate 160 MG Tab, TAKE 1 TABLET BY MOUTH AT BEDTIME, Disp: 90 tablet, Rfl: 3    lisinopriL (PRINIVIL,ZESTRIL) 20 MG tablet, Take 1 tablet by mouth once daily, Disp: 90 tablet, Rfl: 0    metFORMIN (GLUCOPHAGE) 850 MG tablet, TAKE 1 TABLET BY MOUTH TWICE DAILY WITH MEALS, Disp: 180 tablet, Rfl: 0    paroxetine (PAXIL-CR) 25 MG 24 hr tablet, TAKE 1 TABLET BY MOUTH ONCE DAILY (NEED  APPOINTMENT), Disp: 90 tablet, Rfl: 0    pravastatin (PRAVACHOL) 20 MG tablet, Take 1 tablet by mouth in the evening, Disp: 90 tablet, Rfl: 0

## 2020-06-19 NOTE — PATIENT INSTRUCTIONS
Diabetes: Activity Tips    Being more active can help you manage your diabetes. The tips on this sheet can help you get the most from your exercise. They can also help you stay safe.  Staying Active  Its important for adults to spend less time sitting and being inactive. This is especially true if you have type 2 diabetes. When you are sitting for long periods of time, get up for short sessions of light activity every 30 minutes.  You should aim for at least 150 minutes a week of exercise or physical activity. Dont let more than 2 days go by without being active.  Benefit from briskness  Brisk activity gets your heart beating faster. This can help you increase your fitness, lose extra weight, and manage your blood sugar level. Try brisk walking. Or, if you have foot or leg problems, you can try swimming or bike riding. You can break up your exercise into chunks throughout the day. Work up to at least 30 minutes of steady, brisk exercise on most days.  Warm up and cool down  Warming up and cooling down reduce your risk of injury. They also help limit muscle soreness. Do a mild version of your activity for 5 minutes before and after your routine. You can also learn stretches that will help keep your muscles loose. Your healthcare provider may show you good ways to warm up and stretch.  Do the talk-sing test  The talk-sing test is a simple way to tell how hard youre exercising. If you can talk while exercising, youre in a safe range. If youre out of breath, slow down. If you can carry a tune, its time to  the pace. Walk up a hill. Increase the resistance on your stationary bike. Or swim faster.  What about eating?  You may be told to plan your exercise for 1 to 2 hours after a meal. In most cases, you dont need to eat while being active. If you take insulin or medicine that can cause low blood sugar, test your blood sugar before exercising. And carry a fast-acting sugar that will raise your blood sugar  level quickly. This includes glucose tablets or hard candy. Use it if you feel low blood sugar symptoms.  Safety tips  These tips can help you stay safe as you become fit:  · Exercise with a friend or carry a cell phone if you have one.  · Carry or wear identification, such as a necklace or bracelet, that says you have diabetes.  · Use the proper footwear and safety equipment for your activity.  · Drink water before, during, and after exercise.  · Dress properly for the weather.  · Dont exercise in very hot or very cold weather.  · Dont exercise if you are sick.  · If you are instructed to do so, test your blood sugar before and after you exercise. Have a small carbohydrate snack if your blood sugar is low before you start exercising.   When to stop exercising and call your healthcare provider  Stop exercising and call your healthcare provider right away if you notice any of the following:  · Pain, pressure, tightness, or heaviness in the chest  · Pain or heaviness in the neck, shoulders, back, arms, legs, or feet  · Unusual shortness of breath  · Dizziness or lightheadedness  · Unusually rapid or slow pulse  · Increased joint or muscle pain  · Nausea or vomiting  Date Last Reviewed: 5/1/2016  © 5353-8400 [employer]. [address] [place] [address], [place] [address]. All rights reserved. This information is not intended as a substitute for professional medical care. Always follow your healthcare professional's instructions.

## 2020-06-19 NOTE — LETTER
June 19, 2020        MALINDA Barnett MD  [id number]  [place] [address]             [facility]  [address] [place] [address]  [place] [address]  Phone: [phone]  Fax: [phone]   Patient: Yan Hernandez Jr.   MR Number: [id number]   YOB: 1955   Date of Visit: 6/19/2020     Dear Dr. Barnett,     I have evaluated Mr. Yan Hernandez who is a 65-year-old gentleman for  medical clearance prior to proposed left shoulder surgery.    Underlying medical issues include type 2 diabetes mellitus, hypertension, dyslipidemia.  He is medically stable for proposed surgery.  Labs are pending for review including EKG.    Assuming acceptable labs, he should continue with his blood pressure medications on the day of surgery.  He should avoid taking metformin on the day of surgery and if needed he can stop the aspirin 5 days prior to surgery.    Cardiology consultation is pending.    Sincerely,      Gregorio Grijalva MD            CC  No Recipients    Enclosure

## 2020-09-30 DIAGNOSIS — M51.36 DEGENERATION OF LUMBAR INTERVERTEBRAL DISC: Primary | ICD-10-CM

## 2020-09-30 DIAGNOSIS — M54.10 RADICULOPATHY: ICD-10-CM

## 2020-10-06 ENCOUNTER — HOSPITAL ENCOUNTER (OUTPATIENT)
Dept: RADIOLOGY | Facility: HOSPITAL | Age: 65
Discharge: HOME | End: 2020-10-06
Attending: INTERNAL MEDICINE
Payer: MEDICARE

## 2020-10-06 DIAGNOSIS — M54.10 RADICULOPATHY: ICD-10-CM

## 2020-10-06 DIAGNOSIS — M51.36 DEGENERATION OF LUMBAR INTERVERTEBRAL DISC: ICD-10-CM

## 2020-10-15 ENCOUNTER — LAB VISIT (OUTPATIENT)
Dept: LAB | Facility: HOSPITAL | Age: 65
End: 2020-10-15
Attending: INTERNAL MEDICINE
Payer: MEDICARE

## 2020-10-15 ENCOUNTER — OFFICE VISIT (OUTPATIENT)
Dept: FAMILY MEDICINE | Facility: CLINIC | Age: 65
End: 2020-10-15
Payer: MEDICARE

## 2020-10-15 VITALS
WEIGHT: 185 LBS | HEART RATE: 71 BPM | HEIGHT: 67 IN | BODY MASS INDEX: 29.03 KG/M2 | TEMPERATURE: 97 F | SYSTOLIC BLOOD PRESSURE: 123 MMHG | DIASTOLIC BLOOD PRESSURE: 83 MMHG

## 2020-10-15 DIAGNOSIS — E11.9 TYPE 2 DIABETES MELLITUS WITHOUT COMPLICATION, WITHOUT LONG-TERM CURRENT USE OF INSULIN: Primary | ICD-10-CM

## 2020-10-15 DIAGNOSIS — E78.00 HYPERCHOLESTEREMIA: ICD-10-CM

## 2020-10-15 DIAGNOSIS — I10 ESSENTIAL HYPERTENSION: ICD-10-CM

## 2020-10-15 DIAGNOSIS — R76.0 ABNORMAL ANTINUCLEAR ANTIBODY TITER: ICD-10-CM

## 2020-10-15 DIAGNOSIS — G89.29 CHRONIC MIDLINE LOW BACK PAIN WITHOUT SCIATICA: ICD-10-CM

## 2020-10-15 DIAGNOSIS — E11.9 TYPE 2 DIABETES MELLITUS WITHOUT COMPLICATION, WITHOUT LONG-TERM CURRENT USE OF INSULIN: ICD-10-CM

## 2020-10-15 DIAGNOSIS — K21.9 GASTROESOPHAGEAL REFLUX DISEASE WITHOUT ESOPHAGITIS: ICD-10-CM

## 2020-10-15 DIAGNOSIS — M54.50 CHRONIC MIDLINE LOW BACK PAIN WITHOUT SCIATICA: ICD-10-CM

## 2020-10-15 LAB
ALT SERPL W/O P-5'-P-CCNC: 48 U/L (ref 10–44)
ANION GAP SERPL CALC-SCNC: 11 MMOL/L (ref 8–16)
BASOPHILS # BLD AUTO: 0.03 K/UL (ref 0–0.2)
BASOPHILS NFR BLD: 0.5 % (ref 0–1.9)
BUN SERPL-MCNC: 21 MG/DL (ref 8–23)
CALCIUM SERPL-MCNC: 9.2 MG/DL (ref 8.7–10.5)
CHLORIDE SERPL-SCNC: 101 MMOL/L (ref 95–110)
CHOLEST SERPL-MCNC: 186 MG/DL (ref 120–199)
CHOLEST/HDLC SERPL: 5.6 {RATIO} (ref 2–5)
CO2 SERPL-SCNC: 23 MMOL/L (ref 23–29)
CREAT SERPL-MCNC: 0.8 MG/DL (ref 0.5–1.4)
DIFFERENTIAL METHOD: ABNORMAL
EOSINOPHIL # BLD AUTO: 0.5 K/UL (ref 0–0.5)
EOSINOPHIL NFR BLD: 7 % (ref 0–8)
ERYTHROCYTE [DISTWIDTH] IN BLOOD BY AUTOMATED COUNT: 12.2 % (ref 11.5–14.5)
EST. GFR  (AFRICAN AMERICAN): >60 ML/MIN/1.73 M^2
EST. GFR  (NON AFRICAN AMERICAN): >60 ML/MIN/1.73 M^2
ESTIMATED AVG GLUCOSE: 180 MG/DL (ref 68–131)
GLUCOSE SERPL-MCNC: 201 MG/DL (ref 70–110)
HBA1C MFR BLD HPLC: 7.9 % (ref 4.5–6.2)
HCT VFR BLD AUTO: 40 % (ref 40–54)
HDLC SERPL-MCNC: 33 MG/DL (ref 40–75)
HDLC SERPL: 17.7 % (ref 20–50)
HGB BLD-MCNC: 13.6 G/DL (ref 14–18)
IMM GRANULOCYTES # BLD AUTO: 0.05 K/UL (ref 0–0.04)
IMM GRANULOCYTES NFR BLD AUTO: 0.8 % (ref 0–0.5)
LDLC SERPL CALC-MCNC: 99.2 MG/DL (ref 63–159)
LYMPHOCYTES # BLD AUTO: 1.4 K/UL (ref 1–4.8)
LYMPHOCYTES NFR BLD: 20.6 % (ref 18–48)
MCH RBC QN AUTO: 30 PG (ref 27–31)
MCHC RBC AUTO-ENTMCNC: 34 G/DL (ref 32–36)
MCV RBC AUTO: 88 FL (ref 82–98)
MONOCYTES # BLD AUTO: 0.5 K/UL (ref 0.3–1)
MONOCYTES NFR BLD: 7.6 % (ref 4–15)
NEUTROPHILS # BLD AUTO: 4.2 K/UL (ref 1.8–7.7)
NEUTROPHILS NFR BLD: 63.5 % (ref 38–73)
NONHDLC SERPL-MCNC: 153 MG/DL
NRBC BLD-RTO: 0 /100 WBC
PLATELET # BLD AUTO: 245 K/UL (ref 150–350)
PMV BLD AUTO: 9.8 FL (ref 9.2–12.9)
POTASSIUM SERPL-SCNC: 4.3 MMOL/L (ref 3.5–5.1)
RBC # BLD AUTO: 4.54 M/UL (ref 4.6–6.2)
SODIUM SERPL-SCNC: 135 MMOL/L (ref 136–145)
TRIGL SERPL-MCNC: 269 MG/DL (ref 30–150)
WBC # BLD AUTO: 6.54 K/UL (ref 3.9–12.7)

## 2020-10-15 PROCEDURE — 3074F PR MOST RECENT SYSTOLIC BLOOD PRESSURE < 130 MM HG: ICD-10-PCS | Mod: S$GLB,,, | Performed by: INTERNAL MEDICINE

## 2020-10-15 PROCEDURE — 1159F PR MEDICATION LIST DOCUMENTED IN MEDICAL RECORD: ICD-10-PCS | Mod: S$GLB,,, | Performed by: INTERNAL MEDICINE

## 2020-10-15 PROCEDURE — 99214 OFFICE O/P EST MOD 30 MIN: CPT | Mod: S$GLB,,, | Performed by: INTERNAL MEDICINE

## 2020-10-15 PROCEDURE — 80048 BASIC METABOLIC PNL TOTAL CA: CPT

## 2020-10-15 PROCEDURE — 1101F PR PT FALLS ASSESS DOC 0-1 FALLS W/OUT INJ PAST YR: ICD-10-PCS | Mod: S$GLB,,, | Performed by: INTERNAL MEDICINE

## 2020-10-15 PROCEDURE — 1126F PR PAIN SEVERITY QUANTIFIED, NO PAIN PRESENT: ICD-10-PCS | Mod: S$GLB,,, | Performed by: INTERNAL MEDICINE

## 2020-10-15 PROCEDURE — 1101F PT FALLS ASSESS-DOCD LE1/YR: CPT | Mod: S$GLB,,, | Performed by: INTERNAL MEDICINE

## 2020-10-15 PROCEDURE — 80061 LIPID PANEL: CPT

## 2020-10-15 PROCEDURE — 85025 COMPLETE CBC W/AUTO DIFF WBC: CPT

## 2020-10-15 PROCEDURE — 3079F DIAST BP 80-89 MM HG: CPT | Mod: S$GLB,,, | Performed by: INTERNAL MEDICINE

## 2020-10-15 PROCEDURE — 1159F MED LIST DOCD IN RCRD: CPT | Mod: S$GLB,,, | Performed by: INTERNAL MEDICINE

## 2020-10-15 PROCEDURE — 3008F PR BODY MASS INDEX (BMI) DOCUMENTED: ICD-10-PCS | Mod: S$GLB,,, | Performed by: INTERNAL MEDICINE

## 2020-10-15 PROCEDURE — 1126F AMNT PAIN NOTED NONE PRSNT: CPT | Mod: S$GLB,,, | Performed by: INTERNAL MEDICINE

## 2020-10-15 PROCEDURE — 83036 HEMOGLOBIN GLYCOSYLATED A1C: CPT

## 2020-10-15 PROCEDURE — 3079F PR MOST RECENT DIASTOLIC BLOOD PRESSURE 80-89 MM HG: ICD-10-PCS | Mod: S$GLB,,, | Performed by: INTERNAL MEDICINE

## 2020-10-15 PROCEDURE — 3074F SYST BP LT 130 MM HG: CPT | Mod: S$GLB,,, | Performed by: INTERNAL MEDICINE

## 2020-10-15 PROCEDURE — 99214 PR OFFICE/OUTPT VISIT, EST, LEVL IV, 30-39 MIN: ICD-10-PCS | Mod: S$GLB,,, | Performed by: INTERNAL MEDICINE

## 2020-10-15 PROCEDURE — 3008F BODY MASS INDEX DOCD: CPT | Mod: S$GLB,,, | Performed by: INTERNAL MEDICINE

## 2020-10-15 PROCEDURE — 36415 COLL VENOUS BLD VENIPUNCTURE: CPT

## 2020-10-15 PROCEDURE — 84460 ALANINE AMINO (ALT) (SGPT): CPT

## 2020-10-15 NOTE — PROGRESS NOTES
Subjective:       Patient ID: Yan Hernandez Jr. is a 65 y.o. male.    Chief Complaint: Results (lab in media ), Hypertension, Hyperlipidemia, Diabetes, and Anxiety    Mr Hernandez is a 65 Y male who comes for follow-up. He has underlying diabetes, hypertension and dyslipidemia.     He recently had to go to the urgent care center for low back pain.  He got a cortisone shot.  He had some labs done which included rheumatoid factor and CRP.  These were negative.  He also had a MEDARDO titer drawn which was positive at 1:160 dilution in homogeneous pattern.  Further cascading down,the tests were negative for anti Maguire antibodies, you Sjogren antibodies and double-stranded DNA antibodies.  Patient recall that his sister was diagnosed with lupus.    As far as symptoms are concerned, he experiences a lower abdominal discomfort and low back pain which was immensely and apparently relieve with a short of cortisone.    He also experiences some difficulty swallowing food and plans to see his gastroenterologist in future.    Underlying medical issues of hypertension, dyslipidemia, type 2 diabetes mellitus currently on metformin have been noted.  He also has stress and anxiety for which he takes paroxetine 25 mg per day.      Soon enough he plans to retire from his job and enjoys fishing.    He states that he is doing okay with his blood sugars and blood pressures. No chest pains. No shortness of breath.    He is still actively working for [employer]. He is very active and if he had his day he would have continued to work for another 35 years.    Plans to retire next year.    Hypertension  This is a chronic problem. The current episode started more than 1 year ago. The problem is controlled. Associated symptoms include anxiety. Pertinent negatives include no chest pain, palpitations or shortness of breath. Risk factors for coronary artery disease include male gender, diabetes mellitus and dyslipidemia. Past treatments  include ACE inhibitors. The current treatment provides moderate improvement. There is no history of CVA, PVD or retinopathy. There is no history of chronic renal disease, coarctation of the aorta, hyperaldosteronism, pheochromocytoma or renovascular disease.   Hyperlipidemia  This is a chronic problem. The current episode started more than 1 year ago. The problem is controlled. Exacerbating diseases include diabetes. He has no history of chronic renal disease, hypothyroidism, liver disease, obesity or nephrotic syndrome. Pertinent negatives include no chest pain or shortness of breath. Current antihyperlipidemic treatment includes statins. The current treatment provides moderate improvement of lipids. Compliance problems include psychosocial issues.  Risk factors for coronary artery disease include hypertension, male sex, diabetes mellitus, dyslipidemia and obesity.   Diabetes  He presents for his follow-up diabetic visit. He has type 2 diabetes mellitus. His disease course has been stable. Hypoglycemia symptoms include nervousness/anxiousness. Pertinent negatives for hypoglycemia include no confusion, dizziness, mood changes, pallor, seizures, sleepiness or speech difficulty. Pertinent negatives for diabetes include no chest pain, no fatigue, no foot ulcerations, no polydipsia, no polyphagia, no polyuria, no weakness and no weight loss. Symptoms are stable. Pertinent negatives for diabetic complications include no CVA, peripheral neuropathy, PVD or retinopathy. Risk factors for coronary artery disease include hypertension, male sex, dyslipidemia and diabetes mellitus. Current diabetic treatment includes oral agent (monotherapy). He is compliant with treatment some of the time. Meal planning includes avoidance of concentrated sweets. He has not had a previous visit with a dietitian. His home blood glucose trend is fluctuating minimally. His breakfast blood glucose range is generally 130-140 mg/dl. An ACE  inhibitor/angiotensin II receptor blocker is being taken. He does not see a podiatrist.  Gastroesophageal Reflux  He reports no abdominal pain, no chest pain or no coughing. This is a recurrent problem. The problem occurs occasionally. The problem has been gradually improving. Pertinent negatives include no fatigue or weight loss. He has tried nothing for the symptoms. The treatment provided moderate relief.   Anxiety  Presents for follow-up visit. Symptoms include insomnia, irritability and nervous/anxious behavior. Patient reports no chest pain, confusion, dizziness, palpitations, panic, restlessness, shortness of breath or suicidal ideas. Symptoms occur occasionally. The severity of symptoms is moderate. The quality of sleep is non-restorative.     Compliance with medications: Currently on Paxil with modest response.       Past Medical History:   Diagnosis Date    Allergy     atorvastatin, Influenza vaccine    Diabetes mellitus     Diabetes mellitus, type 2     Eosinophilic esophagitis 2/18/2020    Based upon endoscopy done by Dr. Clark Sprague.   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     Employer: [employer]   Social Needs    Financial resource strain: Not on file    Food insecurity     Worry: Not on file     Inability: Not on file    Transportation needs     Medical: Not on file     Non-medical: Not on file   Tobacco Use    Smoking status: Former Smoker     Types: Cigarettes   Substance and Sexual Activity    Alcohol use: No    Drug use: No    Sexual activity: Not Currently   Lifestyle    Physical activity     Days per week: Not on file     Minutes per session:  Not on file    Stress: To some extent   Relationships    Social connections     Talks on phone: Not on file     Gets together: Not on file     Attends Judaism service: Not on file     Active member of club or organization: Not on file     Attends meetings of clubs or organizations: Not on file     Relationship status: Not on file   Other Topics Concern    Not on file   Social History Narrative    Not on file     History reviewed. No pertinent surgical history.  Family History   Problem Relation Age of Onset    Arthritis Mother     Alzheimer's disease Mother     Cancer Father        Review of Systems   Constitutional: Positive for irritability. Negative for activity change, appetite change, fatigue, unexpected weight change (lost 3 lbs) and weight loss.   HENT: Negative for congestion, sneezing and trouble swallowing.    Eyes: Negative for pain, discharge and visual disturbance.   Respiratory: Negative for cough, chest tightness and shortness of breath.    Cardiovascular: Negative for chest pain, palpitations and leg swelling.        Hypertension stable.   Gastrointestinal: Negative for abdominal distention, abdominal pain, blood in stool, constipation and diarrhea.        Gastroesophageal reflux with LA grade 1 esophagitis.  Currently not on any medications.  Watching his diet.   Endocrine: Negative for cold intolerance, heat intolerance, polydipsia, polyphagia and polyuria.        Diabetes mellitus type2.. Hyperlipidemia.   Genitourinary: Negative for dysuria, hematuria and scrotal swelling.   Musculoskeletal: Negative for arthralgias, back pain and gait problem.   Skin: Negative for pallor, rash and wound.   Allergic/Immunologic: Negative for environmental allergies, food allergies and immunocompromised state.   Neurological: Negative for dizziness, seizures, speech difficulty, weakness, light-headedness and numbness.   Hematological: Negative for adenopathy. Does not bruise/bleed easily.  "  Psychiatric/Behavioral: Negative for agitation, behavioral problems, confusion and suicidal ideas. The patient is nervous/anxious and has insomnia.         History of anxiety stable on Paxil.         Objective:      Blood pressure 123/83, pulse 71, temperature 97.1 °F (36.2 °C), height 5' 7" (1.702 m), weight 83.9 kg (185 lb). Body mass index is 28.98 kg/m².  Physical Exam  Vitals signs and nursing note reviewed.   Constitutional:       General: He is not in acute distress.     Appearance: He is well-developed. He is obese. He is not ill-appearing, toxic-appearing or diaphoretic.      Comments: BMI 28.98   HENT:      Head: Normocephalic and atraumatic.      Nose: Nose normal.      Mouth/Throat:      Pharynx: No oropharyngeal exudate.   Eyes:      General: No scleral icterus.     Conjunctiva/sclera: Conjunctivae normal.   Neck:      Musculoskeletal: Normal range of motion and neck supple. No muscular tenderness.      Thyroid: No thyromegaly.      Vascular: No carotid bruit or JVD.      Trachea: No tracheal deviation.   Cardiovascular:      Rate and Rhythm: Normal rate and regular rhythm.      Heart sounds: Normal heart sounds. No murmur. No friction rub. No gallop.    Pulmonary:      Effort: Pulmonary effort is normal. No respiratory distress.      Breath sounds: Normal breath sounds. No wheezing or rales.   Abdominal:      General: Bowel sounds are normal. There is no distension.      Palpations: Abdomen is soft.      Tenderness: There is no abdominal tenderness.   Musculoskeletal: Normal range of motion.         General: No swelling, tenderness, deformity or signs of injury.      Right lower leg: No edema.      Left lower leg: No edema.      Right foot: Normal range of motion. No deformity.      Left foot: Normal range of motion. No deformity.      Comments: Examination of the joints do not show any evidence of arthritis or swelling.   Feet:      Right foot:      Protective Sensation: 5 sites tested. 5 sites " sensed.      Skin integrity: No ulcer, blister, skin breakdown, erythema, warmth, callus or dry skin.      Left foot:      Protective Sensation: 5 sites tested. 4 sites sensed.      Skin integrity: No ulcer, blister, skin breakdown, erythema, warmth, callus or dry skin.   Skin:     General: Skin is warm and dry.      Coloration: Skin is not jaundiced or pale.      Findings: No bruising, erythema, lesion or rash.      Comments: Examination of the skin does not show any evidence of rash which might be pathognomonic of lupus.   Neurological:      Mental Status: He is alert. Mental status is at baseline.      Deep Tendon Reflexes: Reflexes are normal and symmetric.   Psychiatric:         Behavior: Behavior normal.           Assessment:       1. Type 2 diabetes mellitus without complication, without long-term current use of insulin    2. Hypercholesteremia    3. Essential hypertension    4. Gastroesophageal reflux disease without esophagitis    5. Abnormal antinuclear antibody titer    6. Chronic midline low back pain without sciatica           Lab Visit on 10/15/2020   Component Date Value Ref Range Status    Microalbum.,U,Random 10/15/2020 7.3  ug/mL Final    Creatinine, Random Ur 10/15/2020 114.0  23.0 - 375.0 mg/dL Final    Microalb Creat Ratio 10/15/2020 6.4  0.0 - 30.0 ug/mg Final   Lab Visit on 10/15/2020   Component Date Value Ref Range Status    Sodium 10/15/2020 135* 136 - 145 mmol/L Final    Potassium 10/15/2020 4.3  3.5 - 5.1 mmol/L Final    Chloride 10/15/2020 101  95 - 110 mmol/L Final    CO2 10/15/2020 23  23 - 29 mmol/L Final    Glucose 10/15/2020 201* 70 - 110 mg/dL Final    BUN, Bld 10/15/2020 21  8 - 23 mg/dL Final    Creatinine 10/15/2020 0.8  0.5 - 1.4 mg/dL Final    Calcium 10/15/2020 9.2  8.7 - 10.5 mg/dL Final    Anion Gap 10/15/2020 11  8 - 16 mmol/L Final    eGFR if African American 10/15/2020 >60.0  >60 mL/min/1.73 m^2 Final    eGFR if non African American 10/15/2020 >60.0  >60  mL/min/1.73 m^2 Final    Cholesterol 10/15/2020 186  120 - 199 mg/dL Final    Triglycerides 10/15/2020 269* 30 - 150 mg/dL Final    HDL 10/15/2020 33* 40 - 75 mg/dL Final    LDL Cholesterol 10/15/2020 99.2  63.0 - 159.0 mg/dL Final    Hdl/Cholesterol Ratio 10/15/2020 17.7* 20.0 - 50.0 % Final    Total Cholesterol/HDL Ratio 10/15/2020 5.6* 2.0 - 5.0 Final    Non-HDL Cholesterol 10/15/2020 153  mg/dL Final    ALT 10/15/2020 48* 10 - 44 U/L Final    WBC 10/15/2020 6.54  3.90 - 12.70 K/uL Final    RBC 10/15/2020 4.54* 4.60 - 6.20 M/uL Final    Hemoglobin 10/15/2020 13.6* 14.0 - 18.0 g/dL Final    Hematocrit 10/15/2020 40.0  40.0 - 54.0 % Final    Mean Corpuscular Volume 10/15/2020 88  82 - 98 fL Final    Mean Corpuscular Hemoglobin 10/15/2020 30.0  27.0 - 31.0 pg Final    Mean Corpuscular Hemoglobin Conc 10/15/2020 34.0  32.0 - 36.0 g/dL Final    RDW 10/15/2020 12.2  11.5 - 14.5 % Final    Platelets 10/15/2020 245  150 - 350 K/uL Final    MPV 10/15/2020 9.8  9.2 - 12.9 fL Final    Immature Granulocytes 10/15/2020 0.8* 0.0 - 0.5 % Final    Gran # (ANC) 10/15/2020 4.2  1.8 - 7.7 K/uL Final    Immature Grans (Abs) 10/15/2020 0.05* 0.00 - 0.04 K/uL Final    Lymph # 10/15/2020 1.4  1.0 - 4.8 K/uL Final    Mono # 10/15/2020 0.5  0.3 - 1.0 K/uL Final    Eos # 10/15/2020 0.5  0.0 - 0.5 K/uL Final    Baso # 10/15/2020 0.03  0.00 - 0.20 K/uL Final    nRBC 10/15/2020 0  0 /100 WBC Final    Gran% 10/15/2020 63.5  38.0 - 73.0 % Final    Lymph% 10/15/2020 20.6  18.0 - 48.0 % Final    Mono% 10/15/2020 7.6  4.0 - 15.0 % Final    Eosinophil% 10/15/2020 7.0  0.0 - 8.0 % Final    Basophil% 10/15/2020 0.5  0.0 - 1.9 % Final    Differential Method 10/15/2020 Automated   Final     MEDARDO antibodies were positive at 1:  160 dilution.  Anti Smith antibodies,  Sjogren antibodies, RNP antibodies were negative.  Rheumatoid factor and C-reactive protein were normal.  Please see the media section for the  reports.    Plan:           Type 2 diabetes mellitus without complication, without long-term current use of insulin  -     Hemoglobin A1C; Future; Expected date: 10/15/2020  -     Microalbumin/Creatinine Ratio, Urine; Future; Expected date: 10/15/2020    Hypercholesteremia  -     Lipid Panel; Future; Expected date: 10/15/2020  -     ALT (SGPT); Future; Expected date: 10/15/2020    Essential hypertension  -     Basic Metabolic Panel; Future; Expected date: 10/15/2020    Gastroesophageal reflux disease without esophagitis    Abnormal antinuclear antibody titer  -     CBC auto differential; Future; Expected date: 10/15/2020    Chronic midline low back pain without sciatica      The new development seems to be a positive MEDARDO test.  Mr. Saucedo seems to have some back pain and some lower abdominal discomfort.  He also has some dysphagia symptoms.  None of these symptoms are classic of lupus.  The symptoms of lupus which are classic are usually fatigue and tiredness, skin lesions and joint pains.  Low back pain itself does not qualify for lupus.  People have to have some blood count abnormalities also.  I will check a blood count also.    He is due for his labs including hemoglobin A1c, lipid panel, chemistry etc. And urine microalbumin.    He does have some dysphagia symptoms and he does plan to see his gastroenterologist.    I recommended him to continue to watch his diet and exercise efforts.  Cut down on the sweet iced tea.  Cut down on the fried and fatty food and more baked or broiled oil or grilled foods.    Given his diabetes and also now potential for lupus I have recommended him strongly to consider flu shots and pneumonia shots.  At this point declines.    Anxiety and stress seems to be manageable with Paxil.  He is not ready to get off this medication at this point.  Perhaps when he retires next year he may consider do so.  This medication should not be stop certainly but should be weaned off gradually over  months.    He will ask his sister the following questions.    1.-when she was diagnosed with lupus.  2.-does anybody else in the family have lupus.  3.- What are her symptoms of lupus?    4.-does she have joint pains or swelling?  5.-does she have any skin changes or rash on the face.  6.-does she have any kidney problems or blood count problems.      Advised Mr. Hernandez to monitor Blood sugars at home and record them.  Exercise, watch diet and loose weight.  keep a close eye on feet and keep them clean. Annual eye examination. Annual influenza vaccine.  Monitor HgbA1c every 3 to 6 months. Monitor urine microalbumin every year.keep LDL less than 100. Monitor blood pressure and target blood pressure 120/70.  The patient is asked to make an attempt to improve diet and exercise patterns to aid in medical management of this problem.    Advised Mr. Hernandez for Anti reflux measures like small feequent meals, avoid spicy and greasy food. Head end up at night.       I will see him back in 4 months time and repeat labs again.    He has been advised to reconsider his decision not to take pneumonia and flu shots because these are important and to protect him against unnecessary infections.    Spent cheli 25 minutes with patient which involved review of pts medical conditions, labs, medications and with 50% of time face-to-face discussion about medical problems, management and any applicable changes.          Current Outpatient Medications:     aspirin (ECOTRIN) 81 MG EC tablet, Take 81 mg by mouth once daily., Disp: , Rfl:     fenofibrate 160 MG Tab, TAKE 1 TABLET BY MOUTH AT BEDTIME, Disp: 90 tablet, Rfl: 3    lisinopriL (PRINIVIL,ZESTRIL) 20 MG tablet, Take 1 tablet by mouth once daily, Disp: 90 tablet, Rfl: 0    metFORMIN (GLUCOPHAGE) 850 MG tablet, TAKE 1 TABLET BY MOUTH TWICE DAILY WITH MEALS, Disp: 180 tablet, Rfl: 0    paroxetine (PAXIL-CR) 25 MG 24 hr tablet, Take 1 tablet by mouth once daily, Disp: 90 tablet, Rfl: 0     pravastatin (PRAVACHOL) 20 MG tablet, Take 1 tablet by mouth in the evening, Disp: 90 tablet, Rfl: 0

## 2020-10-15 NOTE — PATIENT INSTRUCTIONS
Lupus (Systemic Lupus Erythematosus, SLE)  Lupus is a chronic (long-term) disease. It causes inflammation in the body. It mainly affects the joints, skin, and muscles. Lupus can affect almost any part of the body, and other common sites affected by lupus include the kidneys, blood cells, lungs, brain, nerves, intestines, eyes, mouth, and heart. Lupus is an autoimmune disease. This means that immune cells in the body begin attacking normal body cells. The cause of this is not known.  Common symptoms include:  · A butterfly-shaped rash across the bridge of the nose and cheeks or a disk-shaped rash on the face, neck, or chest  · Sun sensitivity (a short time in the sun may lead to severe sunburn or rash)  · Stiff, painful, or swollen joints (arthritis)  · Fatigue or depression  · Fever  Your healthcare provider may prescribe medicines such as oral steroids or medicines to suppress the immune system. Some people benefit from anti-malarial medicines as well. People with lupus are more likely to have heart disease. So, it is vital to manage other risk factors for heart disease. These include high blood pressure, smoking, and unhealthy cholesterol.   There is no cure for lupus. With good care, though, most people with the condition lead normal, active lives.   Home care  · If you were prescribed a medicine, take it as directed.  · Unless another pain medicine was prescribed, take an over-the-counter pain medicine such as acetaminophen or ibuprofen for pain. Do not take ibuprofen or other NSAID (non-steroidal anti-inflammatory) medicine if you were prescribed prednisone.  · Avoid sun exposure. Cover up with clothing. Wear sunglasses. Use sun screen (at least SPF 15).  · Get enough rest and reduce stress to help your immune system.  · Get some physical activity every day. This will help you feel your best.  · If you have high blood pressure, consider buying an automatic blood pressure machine (available at most  pharmacies). Use this to monitor your blood pressure and report to your doctor.  · Limit alcohol intake. Eat a healthy, balanced diet low in fat and cholesterol.  · If you smoke, quit. Smoking increases the risk of lupus-related complications.  Follow-up care  Follow up with your healthcare provider or as advised by our staff.  For more information contact the Lupus [facility] at [phone]  www.lupus.org  When to seek medical advice  Call your healthcare provider for any of the following:  · Increasing weakness, fainting  · Chest pain or shortness of breath or pain with breathing  · Severe headache with fever  · Seizures  · Leg swelling, redness or tenderness (sign of blood clot)  · Unusual bruising or bleeding anywhere on your body  · Blood in your stool (black or red color)  · Abdominal pain, repeated vomiting  · Blood or development of significant bubbles in the urine  · Swelling in the legs and arms  · Development of ulcers in the mouth  · New rash  · Rashes, discoloration or ulcerations on the finger tips or toes  · You become pregnant or are planning to become pregnant  Date Last Reviewed: 3/1/2017  © 3841-8117 [employer]. [address] [place] [address]. All rights reserved. This information is not intended as a substitute for professional medical care. Always follow your healthcare professional's instructions.

## 2020-10-19 ENCOUNTER — TELEPHONE (OUTPATIENT)
Dept: FAMILY MEDICINE | Facility: CLINIC | Age: 65
End: 2020-10-19

## 2020-10-19 NOTE — TELEPHONE ENCOUNTER
Pt. states that he is testing his BS. He also said he is taking his Metformin bid & he cut down on the sweet tea. He will drop off his BS logs in 2 wks. His sister is out of town so he will ask her about the lupus when she gets back. He said he should have an answer for that by the end of the wk.

## 2020-10-19 NOTE — PROGRESS NOTES
Notify patient that his blood sugars have risen from 6.6-7.9.  Is he monitoring his blood sugars at home.?  He is taking metformin 850 mg twice a day.  He used to drink a lot of sweet iced tea and he needs to cut down on that.  He needs to update me with the blood sugar records in 2 weeks.  I might see him earlier rather than in 4-6 months time.  Did he also check with his family about history of lupus.  He was supposed to check with sister.

## 2020-10-19 NOTE — TELEPHONE ENCOUNTER
----- Message from Gregorio Grijalva MD sent at 10/18/2020 10:27 PM CDT -----  Notify patient that his blood sugars have risen from 6.6-7.9.  Is he monitoring his blood sugars at home.?  He is taking metformin 850 mg twice a day.  He used to drink a lot of sweet iced tea and he needs to cut down on that.  He needs to update me with the blood sugar records in 2 weeks.  I might see him earlier rather than in 4-6 months time.  Did he also check with his family about history of lupus.  He was supposed to check with sister.

## 2020-11-18 ENCOUNTER — OFFICE VISIT (OUTPATIENT)
Dept: FAMILY MEDICINE | Facility: CLINIC | Age: 65
End: 2020-11-18
Payer: MEDICARE

## 2020-11-18 VITALS
HEART RATE: 75 BPM | DIASTOLIC BLOOD PRESSURE: 77 MMHG | BODY MASS INDEX: 28.88 KG/M2 | WEIGHT: 184 LBS | SYSTOLIC BLOOD PRESSURE: 139 MMHG | TEMPERATURE: 98 F | HEIGHT: 67 IN

## 2020-11-18 DIAGNOSIS — E78.00 HYPERCHOLESTEREMIA: ICD-10-CM

## 2020-11-18 DIAGNOSIS — I10 ESSENTIAL HYPERTENSION: ICD-10-CM

## 2020-11-18 DIAGNOSIS — Z23 NEED FOR VACCINATION WITH 13-POLYVALENT PNEUMOCOCCAL CONJUGATE VACCINE: ICD-10-CM

## 2020-11-18 DIAGNOSIS — R76.8 POSITIVE ANA (ANTINUCLEAR ANTIBODY): Primary | ICD-10-CM

## 2020-11-18 DIAGNOSIS — E11.9 TYPE 2 DIABETES MELLITUS WITHOUT COMPLICATION, WITHOUT LONG-TERM CURRENT USE OF INSULIN: ICD-10-CM

## 2020-11-18 PROBLEM — J20.9 ACUTE BRONCHITIS: Status: RESOLVED | Noted: 2017-06-01 | Resolved: 2020-11-18

## 2020-11-18 PROCEDURE — 3051F HG A1C>EQUAL 7.0%<8.0%: CPT | Mod: S$GLB,,, | Performed by: INTERNAL MEDICINE

## 2020-11-18 PROCEDURE — 3078F PR MOST RECENT DIASTOLIC BLOOD PRESSURE < 80 MM HG: ICD-10-PCS | Mod: S$GLB,,, | Performed by: INTERNAL MEDICINE

## 2020-11-18 PROCEDURE — 1101F PT FALLS ASSESS-DOCD LE1/YR: CPT | Mod: S$GLB,,, | Performed by: INTERNAL MEDICINE

## 2020-11-18 PROCEDURE — 3008F PR BODY MASS INDEX (BMI) DOCUMENTED: ICD-10-PCS | Mod: S$GLB,,, | Performed by: INTERNAL MEDICINE

## 2020-11-18 PROCEDURE — 1159F MED LIST DOCD IN RCRD: CPT | Mod: S$GLB,,, | Performed by: INTERNAL MEDICINE

## 2020-11-18 PROCEDURE — 90670 PNEUMOCOCCAL CONJUGATE VACCINE 13-VALENT LESS THAN 5YO & GREATER THAN: ICD-10-PCS | Mod: S$GLB,,, | Performed by: INTERNAL MEDICINE

## 2020-11-18 PROCEDURE — 3078F DIAST BP <80 MM HG: CPT | Mod: S$GLB,,, | Performed by: INTERNAL MEDICINE

## 2020-11-18 PROCEDURE — G0009 ADMIN PNEUMOCOCCAL VACCINE: HCPCS | Mod: S$GLB,,, | Performed by: INTERNAL MEDICINE

## 2020-11-18 PROCEDURE — 1126F PR PAIN SEVERITY QUANTIFIED, NO PAIN PRESENT: ICD-10-PCS | Mod: S$GLB,,, | Performed by: INTERNAL MEDICINE

## 2020-11-18 PROCEDURE — 3008F BODY MASS INDEX DOCD: CPT | Mod: S$GLB,,, | Performed by: INTERNAL MEDICINE

## 2020-11-18 PROCEDURE — 3288F PR FALLS RISK ASSESSMENT DOCUMENTED: ICD-10-PCS | Mod: S$GLB,,, | Performed by: INTERNAL MEDICINE

## 2020-11-18 PROCEDURE — 3051F PR MOST RECENT HEMOGLOBIN A1C LEVEL 7.0 - < 8.0%: ICD-10-PCS | Mod: S$GLB,,, | Performed by: INTERNAL MEDICINE

## 2020-11-18 PROCEDURE — 99214 OFFICE O/P EST MOD 30 MIN: CPT | Mod: 25,S$GLB,, | Performed by: INTERNAL MEDICINE

## 2020-11-18 PROCEDURE — 3288F FALL RISK ASSESSMENT DOCD: CPT | Mod: S$GLB,,, | Performed by: INTERNAL MEDICINE

## 2020-11-18 PROCEDURE — 1101F PR PT FALLS ASSESS DOC 0-1 FALLS W/OUT INJ PAST YR: ICD-10-PCS | Mod: S$GLB,,, | Performed by: INTERNAL MEDICINE

## 2020-11-18 PROCEDURE — 99214 PR OFFICE/OUTPT VISIT, EST, LEVL IV, 30-39 MIN: ICD-10-PCS | Mod: 25,S$GLB,, | Performed by: INTERNAL MEDICINE

## 2020-11-18 PROCEDURE — 90670 PCV13 VACCINE IM: CPT | Mod: S$GLB,,, | Performed by: INTERNAL MEDICINE

## 2020-11-18 PROCEDURE — 3075F PR MOST RECENT SYSTOLIC BLOOD PRESS GE 130-139MM HG: ICD-10-PCS | Mod: S$GLB,,, | Performed by: INTERNAL MEDICINE

## 2020-11-18 PROCEDURE — 1159F PR MEDICATION LIST DOCUMENTED IN MEDICAL RECORD: ICD-10-PCS | Mod: S$GLB,,, | Performed by: INTERNAL MEDICINE

## 2020-11-18 PROCEDURE — G0009 PNEUMOCOCCAL CONJUGATE VACCINE 13-VALENT LESS THAN 5YO & GREATER THAN: ICD-10-PCS | Mod: S$GLB,,, | Performed by: INTERNAL MEDICINE

## 2020-11-18 PROCEDURE — 3075F SYST BP GE 130 - 139MM HG: CPT | Mod: S$GLB,,, | Performed by: INTERNAL MEDICINE

## 2020-11-18 PROCEDURE — 1126F AMNT PAIN NOTED NONE PRSNT: CPT | Mod: S$GLB,,, | Performed by: INTERNAL MEDICINE

## 2020-11-18 NOTE — PROGRESS NOTES
Subjective:       Patient ID: Yan Hernandez Jr. is a 65 y.o. male.    Chief Complaint: Diabetes, Lupus, Hyperlipidemia, and Hypertension    Mr. Hernandez is a 65-year-old gentleman who comes for follow-up.  Underlying medical issues of type 2 diabetes mellitus, hyperlipidemia, hypertension and generalized anxiety has been noted.      Last visit he had a test done from outside which was positive for MEDARDO screening but the cascade screening was negative.  Reason for getting this lupus screen is unclear but may have something to do with some skin redness and arthritic symptoms.  He was concerned about lupus.  His sister had lupus and he again inquired from his sister.  As per the available information, the sister was diagnosed with lupus more than 10 years back and she was on Plaquenil for the same.  Her symptoms were pain in the joints and fatigue on occasions.  Now the sister is off Plaquenil since she is feeling better and is under the care of a rheumatologist in [facility].      1Malar rash - a rash over the cheeks and nose, often in the shape of a butterfly-- No Malar Rash--  2Discoid rash - a rash that appears as red, raised, disk-shaped patches No discoid rash--  3Photosensitivity - a reaction to sun or light that causes a skin rash to appear or get worse--no significant photosensitivity.----  4Oral ulcers - sores appearing in the mouth--no mouth ulcers.  5Arthritis - joint pain and swelling of two or more joints in which the bones around the joints do not become destroyed--some wrist and joint pains.  6Serositis - inflammation of the lining around the lungs (pleuritis) or inflammation of the lining around the heart that causes chest pain which is worse with deep breathing (pericarditis)--- reports that as a childhood he had pleurisy but not at this phase of life.  7Kidney disorder - persistent protein or cellular casts in the urine  8Neurological disorder - seizures or psychosis--patient never had  seizures.  He is anxious but never diagnosed with psychosis or mental breakdown.  He was never diagnosed with significant crease illness.  9Blood disorder - anemia (low red blood cell count), leukopenia (low white blood cell count), lymphopenia (low level of specific white blood cells), or thrombocytopenia (low platelet count)--mild abnormalities in the blood.  10Immunologic disorder - anti-DNA or anti-Sm or positive antiphospholipid antibodies  11Abnormal antinuclear antibody (MEDARDO-- positive MEDARDO.  - No     Diabetes  He presents for his follow-up diabetic visit. He has type 2 diabetes mellitus. His disease course has been improving. Pertinent negatives for diabetes include no foot ulcerations. Symptoms are improving. Pertinent negatives for diabetic complications include no CVA. Risk factors for coronary artery disease include hypertension, male sex, sedentary lifestyle, dyslipidemia and diabetes mellitus. Current diabetic treatment includes oral agent (monotherapy). He is compliant with treatment some of the time. Meal planning includes avoidance of concentrated sweets. He has not had a previous visit with a dietitian. He rarely participates in exercise. His home blood glucose trend is decreasing steadily. An ACE inhibitor/angiotensin II receptor blocker is being taken. He does not see a podiatrist.Eye exam is not current.   Hypertension  This is a chronic problem. The current episode started more than 1 year ago. The problem has been waxing and waning since onset. Risk factors for coronary artery disease include male gender, dyslipidemia and diabetes mellitus. Past treatments include ACE inhibitors. The current treatment provides moderate improvement. Compliance problems include psychosocial issues.  There is no history of CVA or heart failure.   Hyperlipidemia  This is a chronic problem. The current episode started more than 1 year ago. The problem is controlled. He has no history of obesity. Current  antihyperlipidemic treatment includes statins and fibric acid derivatives. The current treatment provides moderate improvement of lipids. Compliance problems include psychosocial issues.  Risk factors for coronary artery disease include hypertension, male sex, dyslipidemia and diabetes mellitus.       Past Medical History:   Diagnosis Date    Acute bronchitis 6/1/2017    Allergy     atorvastatin, Influenza vaccine    Diabetes mellitus     Diabetes mellitus, type 2     Eosinophilic esophagitis 2/18/2020    Based upon endoscopy done by Dr. Clark Sprague.   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     Employer: [employer]   Social Needs    Financial resource strain: Not on file    Food insecurity     Worry: Not on file     Inability: Not on file    Transportation needs     Medical: Not on file     Non-medical: Not on file   Tobacco Use    Smoking status: Former Smoker     Types: Cigarettes    Smokeless tobacco: Never Used   Substance and Sexual Activity    Alcohol use: No    Drug use: No    Sexual activity: Not Currently   Lifestyle    Physical activity     Days per week: Not on file     Minutes per session: Not on file    Stress: To some extent   Relationships    Social connections     Talks on phone: Not on file     Gets together: Not on file     Attends [employer] service: Not on file     Active member of club or organization: Not on file     Attends meetings of clubs or organizations: Not on file     Relationship status: Not on file   Other Topics Concern    Not on file   Social History Narrative    Not on file     History reviewed. No pertinent surgical  "history.  Family History   Problem Relation Age of Onset    Arthritis Mother     Alzheimer's disease Mother     Cancer Father        Review of Systems      Objective:      Blood pressure 139/77, pulse 75, temperature 97.9 °F (36.6 °C), height 5' 7" (1.702 m), weight 83.5 kg (184 lb). Body mass index is 28.82 kg/m².  Physical Exam  Vitals signs and nursing note reviewed.   Constitutional:       General: He is not in acute distress.     Appearance: He is well-developed. He is obese. He is not ill-appearing, toxic-appearing or diaphoretic.      Comments: BMI 28.98   HENT:      Head: Normocephalic and atraumatic.   Eyes:      General: No scleral icterus.     Conjunctiva/sclera: Conjunctivae normal.   Neck:      Musculoskeletal: Normal range of motion and neck supple. No muscular tenderness.      Thyroid: No thyromegaly.      Vascular: No carotid bruit or JVD.      Trachea: No tracheal deviation.   Cardiovascular:      Rate and Rhythm: Normal rate and regular rhythm.      Heart sounds: Normal heart sounds. No murmur. No friction rub. No gallop.    Pulmonary:      Effort: Pulmonary effort is normal.      Breath sounds: Normal breath sounds.   Abdominal:      General: Bowel sounds are normal.      Palpations: Abdomen is soft. There is no mass.      Hernia: No hernia is present.   Musculoskeletal: Normal range of motion.         General: No swelling, tenderness, deformity or signs of injury.      Right lower leg: No edema.      Left lower leg: No edema.      Right foot: Normal range of motion. No deformity.      Left foot: Normal range of motion. No deformity.      Comments: Examination of the joints do not show any evidence of arthritis or swelling.   Feet:      Right foot:      Protective Sensation: 5 sites tested. 5 sites sensed.      Skin integrity: No ulcer, blister, skin breakdown, erythema, warmth, callus or dry skin.      Left foot:      Protective Sensation: 5 sites tested. 4 sites sensed.      Skin integrity: " No ulcer, blister, skin breakdown, erythema, warmth, callus or dry skin.   Lymphadenopathy:      Cervical: No cervical adenopathy.   Skin:     General: Skin is warm and dry.      Comments: Examination of the skin does not show any evidence of rash which might be pathognomonic of lupus.  Some age related and sun exposure changes have been noted.   Neurological:      Mental Status: He is alert. Mental status is at baseline.      Deep Tendon Reflexes: Reflexes are normal and symmetric.   Psychiatric:         Behavior: Behavior normal.      Comments: Historically patient has pain appropriate with some degree of anxiety and perhaps mild depression but not certainly to the psychotic proportions.           Assessment:       1. Positive MEDARDO (antinuclear antibody)    2. Type 2 diabetes mellitus without complication, without long-term current use of insulin    3. Essential hypertension    4. Hypercholesteremia    5. Need for vaccination with 13-polyvalent pneumococcal conjugate vaccine           Lab Visit on 10/15/2020   Component Date Value Ref Range Status    Microalbumin, Urine 10/15/2020 7.3  ug/mL Final    Creatinine, Urine 10/15/2020 114.0  23.0 - 375.0 mg/dL Final    Microalb/Creat Ratio 10/15/2020 6.4  0.0 - 30.0 ug/mg Final   Lab Visit on 10/15/2020   Component Date Value Ref Range Status    Hemoglobin A1C 10/15/2020 7.9* 4.5 - 6.2 % Final    Estimated Avg Glucose 10/15/2020 180* 68 - 131 mg/dL Final    Sodium 10/15/2020 135* 136 - 145 mmol/L Final    Potassium 10/15/2020 4.3  3.5 - 5.1 mmol/L Final    Chloride 10/15/2020 101  95 - 110 mmol/L Final    CO2 10/15/2020 23  23 - 29 mmol/L Final    Glucose 10/15/2020 201* 70 - 110 mg/dL Final    BUN 10/15/2020 21  8 - 23 mg/dL Final    Creatinine 10/15/2020 0.8  0.5 - 1.4 mg/dL Final    Calcium 10/15/2020 9.2  8.7 - 10.5 mg/dL Final    Anion Gap 10/15/2020 11  8 - 16 mmol/L Final    eGFR if African American 10/15/2020 >60.0  >60 mL/min/1.73 m^2 Final     eGFR if non African American 10/15/2020 >60.0  >60 mL/min/1.73 m^2 Final    Cholesterol 10/15/2020 186  120 - 199 mg/dL Final    Triglycerides 10/15/2020 269* 30 - 150 mg/dL Final    HDL 10/15/2020 33* 40 - 75 mg/dL Final    LDL Cholesterol 10/15/2020 99.2  63.0 - 159.0 mg/dL Final    HDL/Cholesterol Ratio 10/15/2020 17.7* 20.0 - 50.0 % Final    Total Cholesterol/HDL Ratio 10/15/2020 5.6* 2.0 - 5.0 Final    Non-HDL Cholesterol 10/15/2020 153  mg/dL Final    ALT 10/15/2020 48* 10 - 44 U/L Final    WBC 10/15/2020 6.54  3.90 - 12.70 K/uL Final    RBC 10/15/2020 4.54* 4.60 - 6.20 M/uL Final    Hemoglobin 10/15/2020 13.6* 14.0 - 18.0 g/dL Final    Hematocrit 10/15/2020 40.0  40.0 - 54.0 % Final    MCV 10/15/2020 88  82 - 98 fL Final    MCH 10/15/2020 30.0  27.0 - 31.0 pg Final    MCHC 10/15/2020 34.0  32.0 - 36.0 g/dL Final    RDW 10/15/2020 12.2  11.5 - 14.5 % Final    Platelets 10/15/2020 245  150 - 350 K/uL Final    MPV 10/15/2020 9.8  9.2 - 12.9 fL Final    Immature Granulocytes 10/15/2020 0.8* 0.0 - 0.5 % Final    Gran # (ANC) 10/15/2020 4.2  1.8 - 7.7 K/uL Final    Immature Grans (Abs) 10/15/2020 0.05* 0.00 - 0.04 K/uL Final    Lymph # 10/15/2020 1.4  1.0 - 4.8 K/uL Final    Mono # 10/15/2020 0.5  0.3 - 1.0 K/uL Final    Eos # 10/15/2020 0.5  0.0 - 0.5 K/uL Final    Baso # 10/15/2020 0.03  0.00 - 0.20 K/uL Final    nRBC 10/15/2020 0  0 /100 WBC Final    Gran % 10/15/2020 63.5  38.0 - 73.0 % Final    Lymph % 10/15/2020 20.6  18.0 - 48.0 % Final    Mono % 10/15/2020 7.6  4.0 - 15.0 % Final    Eosinophil % 10/15/2020 7.0  0.0 - 8.0 % Final    Basophil % 10/15/2020 0.5  0.0 - 1.9 % Final    Differential Method 10/15/2020 Automated   Final         Plan:           Positive MEDARDO (antinuclear antibody)  -     Ambulatory referral/consult to Rheumatology; Future; Expected date: 11/25/2020    Type 2 diabetes mellitus without complication, without long-term current use of insulin  -      Hemoglobin A1C; Future; Expected date: 03/01/2021    Essential hypertension  -     Basic Metabolic Panel; Future; Expected date: 03/01/2021    Hypercholesteremia    Need for vaccination with 13-polyvalent pneumococcal conjugate vaccine  -     (In Office Administered) Pneumococcal Conjugate Vaccine (13 Valent) (IM)     Today he will be updated on the Prevnar 13.    I have encouraged him to continue to watch his diet.    Has a positive MEDARDO but does not really meet the criteria for lupus.  No history of current serositis or pleurisy or pericarditis.  Had a detailed discussion about lupus and its presentations.  Sisters history of lupus has been noted.  She has followed up with rheumatologist at [facility].    He does have some CBC abnormalities.    Other confirmation tests are negative.    He does have some mild anxiety but has not been diagnosed with psychosis.    He does not have any significant arthritis though he has some arthralgias.    I will make a referral to Rheumatology for an opinion.  Probably he might have undifferentiated connective tissue disorder based upon positive MEDARDO.    Labs for future have been discussed.    Follow-up in 4 months.    Spent cheli 25 minutes with patient which involved review of pts medical conditions, labs, medications and with 50% of time face-to-face discussion about medical problems, management and any applicable changes.        Current Outpatient Medications:     aspirin (ECOTRIN) 81 MG EC tablet, Take 81 mg by mouth once daily., Disp: , Rfl:     fenofibrate 160 MG Tab, TAKE 1 TABLET BY MOUTH AT BEDTIME, Disp: 90 tablet, Rfl: 3    lisinopriL (PRINIVIL,ZESTRIL) 20 MG tablet, Take 1 tablet by mouth once daily, Disp: 90 tablet, Rfl: 1    metFORMIN (GLUCOPHAGE) 850 MG tablet, TAKE 1 TABLET BY MOUTH TWICE DAILY WITH MEALS, Disp: 180 tablet, Rfl: 0    paroxetine (PAXIL-CR) 25 MG 24 hr tablet, Take 1 tablet by mouth once daily, Disp: 90 tablet, Rfl: 0    pravastatin  (PRAVACHOL) 20 MG tablet, Take 1 tablet by mouth in the evening, Disp: 90 tablet, Rfl: 0

## 2020-11-18 NOTE — PATIENT INSTRUCTIONS
Lupus (Systemic Lupus Erythematosus, SLE)  Lupus is a chronic (long-term) disease. It causes inflammation in the body. It mainly affects the joints, skin, and muscles. Lupus can affect almost any part of the body, and other common sites affected by lupus include the kidneys, blood cells, lungs, brain, nerves, intestines, eyes, mouth, and heart. Lupus is an autoimmune disease. This means that immune cells in the body begin attacking normal body cells. The cause of this is not known.  Common symptoms include:  · A butterfly-shaped rash across the bridge of the nose and cheeks or a disk-shaped rash on the face, neck, or chest  · Sun sensitivity (a short time in the sun may lead to severe sunburn or rash)  · Stiff, painful, or swollen joints (arthritis)  · Fatigue or depression  · Fever  Your healthcare provider may prescribe medicines such as oral steroids or medicines to suppress the immune system. Some people benefit from anti-malarial medicines as well. People with lupus are more likely to have heart disease. So, it is vital to manage other risk factors for heart disease. These include high blood pressure, smoking, and unhealthy cholesterol.   There is no cure for lupus. With good care, though, most people with the condition lead normal, active lives.   Home care  · If you were prescribed a medicine, take it as directed.  · Unless another pain medicine was prescribed, take an over-the-counter pain medicine such as acetaminophen or ibuprofen for pain. Do not take ibuprofen or other NSAID (non-steroidal anti-inflammatory) medicine if you were prescribed prednisone.  · Avoid sun exposure. Cover up with clothing. Wear sunglasses. Use sun screen (at least SPF 15).  · Get enough rest and reduce stress to help your immune system.  · Get some physical activity every day. This will help you feel your best.  · If you have high blood pressure, consider buying an automatic blood pressure machine (available at most  pharmacies). Use this to monitor your blood pressure and report to your doctor.  · Limit alcohol intake. Eat a healthy, balanced diet low in fat and cholesterol.  · If you smoke, quit. Smoking increases the risk of lupus-related complications.  Follow-up care  Follow up with your healthcare provider or as advised by our staff.  For more information contact the Lupus Foundation at [phone]  www.lupus.org  When to seek medical advice  Call your healthcare provider for any of the following:  · Increasing weakness, fainting  · Chest pain or shortness of breath or pain with breathing  · Severe headache with fever  · Seizures  · Leg swelling, redness or tenderness (sign of blood clot)  · Unusual bruising or bleeding anywhere on your body  · Blood in your stool (black or red color)  · Abdominal pain, repeated vomiting  · Blood or development of significant bubbles in the urine  · Swelling in the legs and arms  · Development of ulcers in the mouth  · New rash  · Rashes, discoloration or ulcerations on the finger tips or toes  · You become pregnant or are planning to become pregnant  Date Last Reviewed: 3/1/2017  © 1793-3040 [email or web address]. [address] [place] [id number]. All rights reserved. This information is not intended as a substitute for professional medical care. Always follow your healthcare professional's instructions.

## 2021-03-02 ENCOUNTER — OFFICE VISIT (OUTPATIENT)
Dept: FAMILY MEDICINE | Facility: CLINIC | Age: 66
End: 2021-03-02
Payer: MEDICARE

## 2021-03-02 VITALS
WEIGHT: 183.31 LBS | BODY MASS INDEX: 28.77 KG/M2 | TEMPERATURE: 98 F | SYSTOLIC BLOOD PRESSURE: 140 MMHG | DIASTOLIC BLOOD PRESSURE: 82 MMHG | HEART RATE: 85 BPM | HEIGHT: 67 IN

## 2021-03-02 DIAGNOSIS — R42 DIZZINESS AND GIDDINESS: Primary | ICD-10-CM

## 2021-03-02 DIAGNOSIS — I10 ESSENTIAL HYPERTENSION: ICD-10-CM

## 2021-03-02 PROCEDURE — 3074F SYST BP LT 130 MM HG: CPT | Mod: S$GLB,,, | Performed by: INTERNAL MEDICINE

## 2021-03-02 PROCEDURE — 3288F PR FALLS RISK ASSESSMENT DOCUMENTED: ICD-10-PCS | Mod: S$GLB,,, | Performed by: INTERNAL MEDICINE

## 2021-03-02 PROCEDURE — 3074F PR MOST RECENT SYSTOLIC BLOOD PRESSURE < 130 MM HG: ICD-10-PCS | Mod: S$GLB,,, | Performed by: INTERNAL MEDICINE

## 2021-03-02 PROCEDURE — 1159F PR MEDICATION LIST DOCUMENTED IN MEDICAL RECORD: ICD-10-PCS | Mod: S$GLB,,, | Performed by: INTERNAL MEDICINE

## 2021-03-02 PROCEDURE — 1126F PR PAIN SEVERITY QUANTIFIED, NO PAIN PRESENT: ICD-10-PCS | Mod: S$GLB,,, | Performed by: INTERNAL MEDICINE

## 2021-03-02 PROCEDURE — 1170F PR FUNCTIONAL STATUS ASSESSED: ICD-10-PCS | Mod: S$GLB,,, | Performed by: INTERNAL MEDICINE

## 2021-03-02 PROCEDURE — 3079F DIAST BP 80-89 MM HG: CPT | Mod: S$GLB,,, | Performed by: INTERNAL MEDICINE

## 2021-03-02 PROCEDURE — 1100F PR PT FALLS ASSESS DOC 2+ FALLS/FALL W/INJURY/YR: ICD-10-PCS | Mod: S$GLB,,, | Performed by: INTERNAL MEDICINE

## 2021-03-02 PROCEDURE — 99214 PR OFFICE/OUTPT VISIT, EST, LEVL IV, 30-39 MIN: ICD-10-PCS | Mod: S$GLB,,, | Performed by: INTERNAL MEDICINE

## 2021-03-02 PROCEDURE — 1159F MED LIST DOCD IN RCRD: CPT | Mod: S$GLB,,, | Performed by: INTERNAL MEDICINE

## 2021-03-02 PROCEDURE — 1126F AMNT PAIN NOTED NONE PRSNT: CPT | Mod: S$GLB,,, | Performed by: INTERNAL MEDICINE

## 2021-03-02 PROCEDURE — 3079F PR MOST RECENT DIASTOLIC BLOOD PRESSURE 80-89 MM HG: ICD-10-PCS | Mod: S$GLB,,, | Performed by: INTERNAL MEDICINE

## 2021-03-02 PROCEDURE — 99214 OFFICE O/P EST MOD 30 MIN: CPT | Mod: S$GLB,,, | Performed by: INTERNAL MEDICINE

## 2021-03-02 PROCEDURE — 3288F FALL RISK ASSESSMENT DOCD: CPT | Mod: S$GLB,,, | Performed by: INTERNAL MEDICINE

## 2021-03-02 PROCEDURE — 1100F PTFALLS ASSESS-DOCD GE2>/YR: CPT | Mod: S$GLB,,, | Performed by: INTERNAL MEDICINE

## 2021-03-02 PROCEDURE — 3008F PR BODY MASS INDEX (BMI) DOCUMENTED: ICD-10-PCS | Mod: S$GLB,,, | Performed by: INTERNAL MEDICINE

## 2021-03-02 PROCEDURE — 3008F BODY MASS INDEX DOCD: CPT | Mod: S$GLB,,, | Performed by: INTERNAL MEDICINE

## 2021-03-02 PROCEDURE — 1170F FXNL STATUS ASSESSED: CPT | Mod: S$GLB,,, | Performed by: INTERNAL MEDICINE

## 2021-03-09 ENCOUNTER — TELEPHONE (OUTPATIENT)
Dept: FAMILY MEDICINE | Facility: CLINIC | Age: 66
End: 2021-03-09

## 2021-03-09 DIAGNOSIS — I10 ESSENTIAL HYPERTENSION: Primary | ICD-10-CM

## 2021-03-12 ENCOUNTER — TELEPHONE (OUTPATIENT)
Dept: FAMILY MEDICINE | Facility: CLINIC | Age: 66
End: 2021-03-12

## 2021-03-16 RX ORDER — LOSARTAN POTASSIUM 50 MG/1
50 TABLET ORAL DAILY
Qty: 30 TABLET | Refills: 3 | Status: SHIPPED | OUTPATIENT
Start: 2021-03-16 | End: 2021-07-13

## 2021-03-17 ENCOUNTER — TELEPHONE (OUTPATIENT)
Dept: FAMILY MEDICINE | Facility: CLINIC | Age: 66
End: 2021-03-17

## 2021-03-17 ENCOUNTER — LAB VISIT (OUTPATIENT)
Dept: LAB | Facility: HOSPITAL | Age: 66
End: 2021-03-17
Attending: INTERNAL MEDICINE
Payer: MEDICARE

## 2021-03-17 DIAGNOSIS — I10 ESSENTIAL HYPERTENSION: ICD-10-CM

## 2021-03-17 DIAGNOSIS — E11.9 TYPE 2 DIABETES MELLITUS WITHOUT COMPLICATION, WITHOUT LONG-TERM CURRENT USE OF INSULIN: ICD-10-CM

## 2021-03-17 LAB
ANION GAP SERPL CALC-SCNC: 11 MMOL/L (ref 8–16)
BUN SERPL-MCNC: 18 MG/DL (ref 8–23)
CALCIUM SERPL-MCNC: 9.5 MG/DL (ref 8.7–10.5)
CHLORIDE SERPL-SCNC: 101 MMOL/L (ref 95–110)
CO2 SERPL-SCNC: 24 MMOL/L (ref 23–29)
CREAT SERPL-MCNC: 0.8 MG/DL (ref 0.5–1.4)
EST. GFR  (AFRICAN AMERICAN): >60 ML/MIN/1.73 M^2
EST. GFR  (NON AFRICAN AMERICAN): >60 ML/MIN/1.73 M^2
ESTIMATED AVG GLUCOSE: 166 MG/DL (ref 68–131)
GLUCOSE SERPL-MCNC: 230 MG/DL (ref 70–110)
HBA1C MFR BLD: 7.4 % (ref 4.5–6.2)
POTASSIUM SERPL-SCNC: 4.6 MMOL/L (ref 3.5–5.1)
SODIUM SERPL-SCNC: 136 MMOL/L (ref 136–145)

## 2021-03-17 PROCEDURE — 80048 BASIC METABOLIC PNL TOTAL CA: CPT | Performed by: INTERNAL MEDICINE

## 2021-03-17 PROCEDURE — 36415 COLL VENOUS BLD VENIPUNCTURE: CPT | Performed by: INTERNAL MEDICINE

## 2021-03-17 PROCEDURE — 83036 HEMOGLOBIN GLYCOSYLATED A1C: CPT | Performed by: INTERNAL MEDICINE

## 2021-03-23 ENCOUNTER — OFFICE VISIT (OUTPATIENT)
Dept: FAMILY MEDICINE | Facility: CLINIC | Age: 66
End: 2021-03-23
Payer: MEDICARE

## 2021-03-23 VITALS
HEART RATE: 79 BPM | DIASTOLIC BLOOD PRESSURE: 75 MMHG | WEIGHT: 185 LBS | HEIGHT: 67 IN | SYSTOLIC BLOOD PRESSURE: 133 MMHG | BODY MASS INDEX: 29.03 KG/M2

## 2021-03-23 DIAGNOSIS — E11.9 TYPE 2 DIABETES MELLITUS WITHOUT COMPLICATION, WITHOUT LONG-TERM CURRENT USE OF INSULIN: ICD-10-CM

## 2021-03-23 DIAGNOSIS — F41.1 ANXIETY, GENERALIZED: ICD-10-CM

## 2021-03-23 DIAGNOSIS — E78.00 HYPERCHOLESTEREMIA: ICD-10-CM

## 2021-03-23 DIAGNOSIS — R42 DIZZINESS AND GIDDINESS: ICD-10-CM

## 2021-03-23 DIAGNOSIS — I10 ESSENTIAL HYPERTENSION: Primary | Chronic | ICD-10-CM

## 2021-03-23 PROCEDURE — 3288F PR FALLS RISK ASSESSMENT DOCUMENTED: ICD-10-PCS | Mod: S$GLB,,, | Performed by: INTERNAL MEDICINE

## 2021-03-23 PROCEDURE — 99213 OFFICE O/P EST LOW 20 MIN: CPT | Mod: S$GLB,,, | Performed by: INTERNAL MEDICINE

## 2021-03-23 PROCEDURE — 1159F MED LIST DOCD IN RCRD: CPT | Mod: S$GLB,,, | Performed by: INTERNAL MEDICINE

## 2021-03-23 PROCEDURE — 3288F FALL RISK ASSESSMENT DOCD: CPT | Mod: S$GLB,,, | Performed by: INTERNAL MEDICINE

## 2021-03-23 PROCEDURE — 99213 PR OFFICE/OUTPT VISIT, EST, LEVL III, 20-29 MIN: ICD-10-PCS | Mod: S$GLB,,, | Performed by: INTERNAL MEDICINE

## 2021-03-23 PROCEDURE — 3078F PR MOST RECENT DIASTOLIC BLOOD PRESSURE < 80 MM HG: ICD-10-PCS | Mod: S$GLB,,, | Performed by: INTERNAL MEDICINE

## 2021-03-23 PROCEDURE — 1126F PR PAIN SEVERITY QUANTIFIED, NO PAIN PRESENT: ICD-10-PCS | Mod: S$GLB,,, | Performed by: INTERNAL MEDICINE

## 2021-03-23 PROCEDURE — 1101F PT FALLS ASSESS-DOCD LE1/YR: CPT | Mod: S$GLB,,, | Performed by: INTERNAL MEDICINE

## 2021-03-23 PROCEDURE — 3078F DIAST BP <80 MM HG: CPT | Mod: S$GLB,,, | Performed by: INTERNAL MEDICINE

## 2021-03-23 PROCEDURE — 3008F PR BODY MASS INDEX (BMI) DOCUMENTED: ICD-10-PCS | Mod: S$GLB,,, | Performed by: INTERNAL MEDICINE

## 2021-03-23 PROCEDURE — 3051F PR MOST RECENT HEMOGLOBIN A1C LEVEL 7.0 - < 8.0%: ICD-10-PCS | Mod: S$GLB,,, | Performed by: INTERNAL MEDICINE

## 2021-03-23 PROCEDURE — 3075F PR MOST RECENT SYSTOLIC BLOOD PRESS GE 130-139MM HG: ICD-10-PCS | Mod: S$GLB,,, | Performed by: INTERNAL MEDICINE

## 2021-03-23 PROCEDURE — 1101F PR PT FALLS ASSESS DOC 0-1 FALLS W/OUT INJ PAST YR: ICD-10-PCS | Mod: S$GLB,,, | Performed by: INTERNAL MEDICINE

## 2021-03-23 PROCEDURE — 1126F AMNT PAIN NOTED NONE PRSNT: CPT | Mod: S$GLB,,, | Performed by: INTERNAL MEDICINE

## 2021-03-23 PROCEDURE — 1159F PR MEDICATION LIST DOCUMENTED IN MEDICAL RECORD: ICD-10-PCS | Mod: S$GLB,,, | Performed by: INTERNAL MEDICINE

## 2021-03-23 PROCEDURE — 3008F BODY MASS INDEX DOCD: CPT | Mod: S$GLB,,, | Performed by: INTERNAL MEDICINE

## 2021-03-23 PROCEDURE — 3075F SYST BP GE 130 - 139MM HG: CPT | Mod: S$GLB,,, | Performed by: INTERNAL MEDICINE

## 2021-03-23 PROCEDURE — 3051F HG A1C>EQUAL 7.0%<8.0%: CPT | Mod: S$GLB,,, | Performed by: INTERNAL MEDICINE

## 2021-03-23 RX ORDER — ASPIRIN 81 MG/1
81 TABLET ORAL DAILY
Qty: 100 TABLET | Refills: 4 | Status: SHIPPED | OUTPATIENT
Start: 2021-03-23

## 2021-03-23 RX ORDER — PRAVASTATIN SODIUM 20 MG/1
20 TABLET ORAL NIGHTLY
Qty: 90 TABLET | Refills: 2 | Status: SHIPPED | OUTPATIENT
Start: 2021-03-23 | End: 2022-01-20

## 2021-03-23 RX ORDER — FENOFIBRATE 160 MG/1
160 TABLET ORAL NIGHTLY
Qty: 90 TABLET | Refills: 2 | Status: SHIPPED | OUTPATIENT
Start: 2021-03-23

## 2021-03-23 RX ORDER — PAROXETINE HYDROCHLORIDE HEMIHYDRATE 25 MG/1
25 TABLET, FILM COATED, EXTENDED RELEASE ORAL DAILY
Qty: 90 TABLET | Refills: 2 | Status: SHIPPED | OUTPATIENT
Start: 2021-03-23 | End: 2022-01-05

## 2021-03-23 RX ORDER — METFORMIN HYDROCHLORIDE 850 MG/1
850 TABLET ORAL 2 TIMES DAILY WITH MEALS
Qty: 180 TABLET | Refills: 2 | Status: SHIPPED | OUTPATIENT
Start: 2021-03-23 | End: 2022-03-08 | Stop reason: SDUPTHER

## 2021-04-13 PROBLEM — Z98.890 HISTORY OF ENDOSCOPY: Status: ACTIVE | Noted: 2021-04-06

## 2021-04-29 ENCOUNTER — OFFICE VISIT (OUTPATIENT)
Dept: PODIATRY | Facility: CLINIC | Age: 66
End: 2021-04-29
Payer: MEDICARE

## 2021-04-29 VITALS
BODY MASS INDEX: 29.43 KG/M2 | HEIGHT: 67 IN | SYSTOLIC BLOOD PRESSURE: 139 MMHG | HEART RATE: 74 BPM | WEIGHT: 187.5 LBS | OXYGEN SATURATION: 99 % | RESPIRATION RATE: 16 BRPM | DIASTOLIC BLOOD PRESSURE: 72 MMHG

## 2021-04-29 DIAGNOSIS — E11.9 TYPE 2 DIABETES MELLITUS WITHOUT COMPLICATION, WITHOUT LONG-TERM CURRENT USE OF INSULIN: Primary | ICD-10-CM

## 2021-04-29 DIAGNOSIS — M21.619 BUNION: ICD-10-CM

## 2021-04-29 PROCEDURE — 3051F PR MOST RECENT HEMOGLOBIN A1C LEVEL 7.0 - < 8.0%: ICD-10-PCS | Mod: CPTII,S$GLB,, | Performed by: PODIATRIST

## 2021-04-29 PROCEDURE — 3288F FALL RISK ASSESSMENT DOCD: CPT | Mod: CPTII,S$GLB,, | Performed by: PODIATRIST

## 2021-04-29 PROCEDURE — 3078F DIAST BP <80 MM HG: CPT | Mod: CPTII,S$GLB,, | Performed by: PODIATRIST

## 2021-04-29 PROCEDURE — 1159F MED LIST DOCD IN RCRD: CPT | Mod: S$GLB,,, | Performed by: PODIATRIST

## 2021-04-29 PROCEDURE — 1101F PR PT FALLS ASSESS DOC 0-1 FALLS W/OUT INJ PAST YR: ICD-10-PCS | Mod: CPTII,S$GLB,, | Performed by: PODIATRIST

## 2021-04-29 PROCEDURE — 1159F PR MEDICATION LIST DOCUMENTED IN MEDICAL RECORD: ICD-10-PCS | Mod: S$GLB,,, | Performed by: PODIATRIST

## 2021-04-29 PROCEDURE — 3051F HG A1C>EQUAL 7.0%<8.0%: CPT | Mod: CPTII,S$GLB,, | Performed by: PODIATRIST

## 2021-04-29 PROCEDURE — 3075F SYST BP GE 130 - 139MM HG: CPT | Mod: CPTII,S$GLB,, | Performed by: PODIATRIST

## 2021-04-29 PROCEDURE — 1126F AMNT PAIN NOTED NONE PRSNT: CPT | Mod: S$GLB,,, | Performed by: PODIATRIST

## 2021-04-29 PROCEDURE — 1101F PT FALLS ASSESS-DOCD LE1/YR: CPT | Mod: CPTII,S$GLB,, | Performed by: PODIATRIST

## 2021-04-29 PROCEDURE — 3288F PR FALLS RISK ASSESSMENT DOCUMENTED: ICD-10-PCS | Mod: CPTII,S$GLB,, | Performed by: PODIATRIST

## 2021-04-29 PROCEDURE — 3008F PR BODY MASS INDEX (BMI) DOCUMENTED: ICD-10-PCS | Mod: CPTII,S$GLB,, | Performed by: PODIATRIST

## 2021-04-29 PROCEDURE — 3008F BODY MASS INDEX DOCD: CPT | Mod: CPTII,S$GLB,, | Performed by: PODIATRIST

## 2021-04-29 PROCEDURE — 3078F PR MOST RECENT DIASTOLIC BLOOD PRESSURE < 80 MM HG: ICD-10-PCS | Mod: CPTII,S$GLB,, | Performed by: PODIATRIST

## 2021-04-29 PROCEDURE — 99214 OFFICE O/P EST MOD 30 MIN: CPT | Mod: S$GLB,,, | Performed by: PODIATRIST

## 2021-04-29 PROCEDURE — 99214 PR OFFICE/OUTPT VISIT, EST, LEVL IV, 30-39 MIN: ICD-10-PCS | Mod: S$GLB,,, | Performed by: PODIATRIST

## 2021-04-29 PROCEDURE — 1126F PR PAIN SEVERITY QUANTIFIED, NO PAIN PRESENT: ICD-10-PCS | Mod: S$GLB,,, | Performed by: PODIATRIST

## 2021-04-29 PROCEDURE — 3075F PR MOST RECENT SYSTOLIC BLOOD PRESS GE 130-139MM HG: ICD-10-PCS | Mod: CPTII,S$GLB,, | Performed by: PODIATRIST

## 2021-06-23 ENCOUNTER — LAB VISIT (OUTPATIENT)
Dept: LAB | Facility: HOSPITAL | Age: 66
End: 2021-06-23
Attending: SPECIALIST
Payer: MEDICARE

## 2021-06-23 DIAGNOSIS — R06.09 PLATYPNEA-ORTHODEOXIA SYNDROME: ICD-10-CM

## 2021-06-23 DIAGNOSIS — R94.31 NONSPECIFIC ABNORMAL ELECTROCARDIOGRAM (ECG) (EKG): Primary | ICD-10-CM

## 2021-06-23 DIAGNOSIS — E78.2 MIXED HYPERLIPIDEMIA: ICD-10-CM

## 2021-06-23 DIAGNOSIS — I10 ESSENTIAL HYPERTENSION, MALIGNANT: ICD-10-CM

## 2021-06-23 DIAGNOSIS — E11.9 DIABETES MELLITUS WITHOUT COMPLICATION: ICD-10-CM

## 2021-06-23 LAB
ALBUMIN SERPL BCP-MCNC: 4.8 G/DL (ref 3.5–5.2)
ALP SERPL-CCNC: 35 U/L (ref 55–135)
ALT SERPL W/O P-5'-P-CCNC: 45 U/L (ref 10–44)
ANION GAP SERPL CALC-SCNC: 13 MMOL/L (ref 8–16)
AST SERPL-CCNC: 33 U/L (ref 10–40)
BASOPHILS # BLD AUTO: 0.04 K/UL (ref 0–0.2)
BASOPHILS NFR BLD: 0.7 % (ref 0–1.9)
BILIRUB SERPL-MCNC: 0.9 MG/DL (ref 0.1–1)
BUN SERPL-MCNC: 18 MG/DL (ref 8–23)
CALCIUM SERPL-MCNC: 9.6 MG/DL (ref 8.7–10.5)
CHLORIDE SERPL-SCNC: 104 MMOL/L (ref 95–110)
CHOLEST SERPL-MCNC: 224 MG/DL (ref 120–199)
CHOLEST/HDLC SERPL: 6.1 {RATIO} (ref 2–5)
CK SERPL-CCNC: 76 U/L (ref 20–200)
CO2 SERPL-SCNC: 24 MMOL/L (ref 23–29)
CREAT SERPL-MCNC: 0.9 MG/DL (ref 0.5–1.4)
DIFFERENTIAL METHOD: ABNORMAL
EOSINOPHIL # BLD AUTO: 0.7 K/UL (ref 0–0.5)
EOSINOPHIL NFR BLD: 12 % (ref 0–8)
ERYTHROCYTE [DISTWIDTH] IN BLOOD BY AUTOMATED COUNT: 12.4 % (ref 11.5–14.5)
EST. GFR  (AFRICAN AMERICAN): >60 ML/MIN/1.73 M^2
EST. GFR  (NON AFRICAN AMERICAN): >60 ML/MIN/1.73 M^2
ESTIMATED AVG GLUCOSE: 157 MG/DL (ref 68–131)
GLUCOSE SERPL-MCNC: 155 MG/DL (ref 70–110)
HBA1C MFR BLD: 7.1 % (ref 4.5–6.2)
HCT VFR BLD AUTO: 43.9 % (ref 40–54)
HDLC SERPL-MCNC: 37 MG/DL (ref 40–75)
HDLC SERPL: 16.5 % (ref 20–50)
HGB BLD-MCNC: 14.6 G/DL (ref 14–18)
IMM GRANULOCYTES # BLD AUTO: 0.03 K/UL (ref 0–0.04)
IMM GRANULOCYTES NFR BLD AUTO: 0.6 % (ref 0–0.5)
LDLC SERPL CALC-MCNC: 145.8 MG/DL (ref 63–159)
LYMPHOCYTES # BLD AUTO: 1.3 K/UL (ref 1–4.8)
LYMPHOCYTES NFR BLD: 23.3 % (ref 18–48)
MCH RBC QN AUTO: 29.2 PG (ref 27–31)
MCHC RBC AUTO-ENTMCNC: 33.3 G/DL (ref 32–36)
MCV RBC AUTO: 88 FL (ref 82–98)
MONOCYTES # BLD AUTO: 0.4 K/UL (ref 0.3–1)
MONOCYTES NFR BLD: 7.2 % (ref 4–15)
NEUTROPHILS # BLD AUTO: 3 K/UL (ref 1.8–7.7)
NEUTROPHILS NFR BLD: 56.2 % (ref 38–73)
NONHDLC SERPL-MCNC: 187 MG/DL
NRBC BLD-RTO: 0 /100 WBC
PLATELET # BLD AUTO: 253 K/UL (ref 150–450)
PMV BLD AUTO: 9.7 FL (ref 9.2–12.9)
POTASSIUM SERPL-SCNC: 4.2 MMOL/L (ref 3.5–5.1)
PROT SERPL-MCNC: 7.6 G/DL (ref 6–8.4)
RBC # BLD AUTO: 5 M/UL (ref 4.6–6.2)
SODIUM SERPL-SCNC: 141 MMOL/L (ref 136–145)
TRIGL SERPL-MCNC: 206 MG/DL (ref 30–150)
TSH SERPL DL<=0.005 MIU/L-ACNC: 1.73 UIU/ML (ref 0.34–5.6)
WBC # BLD AUTO: 5.4 K/UL (ref 3.9–12.7)

## 2021-06-23 PROCEDURE — 84443 ASSAY THYROID STIM HORMONE: CPT | Performed by: SPECIALIST

## 2021-06-23 PROCEDURE — 36415 COLL VENOUS BLD VENIPUNCTURE: CPT | Performed by: SPECIALIST

## 2021-06-23 PROCEDURE — 85025 COMPLETE CBC W/AUTO DIFF WBC: CPT | Performed by: SPECIALIST

## 2021-06-23 PROCEDURE — 80061 LIPID PANEL: CPT | Mod: 59 | Performed by: SPECIALIST

## 2021-06-23 PROCEDURE — 82550 ASSAY OF CK (CPK): CPT | Performed by: SPECIALIST

## 2021-06-23 PROCEDURE — 80053 COMPREHEN METABOLIC PANEL: CPT | Performed by: SPECIALIST

## 2021-06-23 PROCEDURE — 83036 HEMOGLOBIN GLYCOSYLATED A1C: CPT | Performed by: SPECIALIST

## 2021-06-23 PROCEDURE — 83704 LIPOPROTEIN BLD QUAN PART: CPT | Performed by: SPECIALIST

## 2021-07-15 LAB
CHOLEST SERPL-MCNC: 223 MG/DL (ref 100–199)
HDL SERPL-SCNC: 29.4 UMOL/L
HDLC SERPL-MCNC: 34 MG/DL
LDL SERPL QN: 20.2 NM
LDL SERPL-SCNC: 2197 NMOL/L
LDL SMALL SERPL-SCNC: 1350 NMOL/L
LDLC SERPL CALC-MCNC: ABNORMAL MG/DL
LP-IR SCORE SERPL: 79
TRIGL SERPL-MCNC: 211 MG/DL (ref 0–149)

## 2021-09-01 ENCOUNTER — TELEPHONE (OUTPATIENT)
Dept: FAMILY MEDICINE | Facility: CLINIC | Age: 66
End: 2021-09-01

## 2021-10-26 ENCOUNTER — OFFICE VISIT (OUTPATIENT)
Dept: FAMILY MEDICINE | Facility: CLINIC | Age: 66
End: 2021-10-26
Payer: MEDICARE

## 2021-10-26 VITALS
WEIGHT: 181 LBS | DIASTOLIC BLOOD PRESSURE: 65 MMHG | HEART RATE: 76 BPM | HEIGHT: 67 IN | SYSTOLIC BLOOD PRESSURE: 137 MMHG | BODY MASS INDEX: 28.41 KG/M2

## 2021-10-26 DIAGNOSIS — I10 ESSENTIAL HYPERTENSION: Primary | ICD-10-CM

## 2021-10-26 DIAGNOSIS — E11.9 TYPE 2 DIABETES MELLITUS WITHOUT COMPLICATION, WITHOUT LONG-TERM CURRENT USE OF INSULIN: ICD-10-CM

## 2021-10-26 DIAGNOSIS — E78.00 HYPERCHOLESTEREMIA: Chronic | ICD-10-CM

## 2021-10-26 DIAGNOSIS — F41.1 ANXIETY, GENERALIZED: ICD-10-CM

## 2021-10-26 PROCEDURE — 1159F PR MEDICATION LIST DOCUMENTED IN MEDICAL RECORD: ICD-10-PCS | Mod: S$GLB,,, | Performed by: INTERNAL MEDICINE

## 2021-10-26 PROCEDURE — 3051F PR MOST RECENT HEMOGLOBIN A1C LEVEL 7.0 - < 8.0%: ICD-10-PCS | Mod: S$GLB,,, | Performed by: INTERNAL MEDICINE

## 2021-10-26 PROCEDURE — 1160F PR REVIEW ALL MEDS BY PRESCRIBER/CLIN PHARMACIST DOCUMENTED: ICD-10-PCS | Mod: S$GLB,,, | Performed by: INTERNAL MEDICINE

## 2021-10-26 PROCEDURE — 1126F PR PAIN SEVERITY QUANTIFIED, NO PAIN PRESENT: ICD-10-PCS | Mod: S$GLB,,, | Performed by: INTERNAL MEDICINE

## 2021-10-26 PROCEDURE — 1159F MED LIST DOCD IN RCRD: CPT | Mod: S$GLB,,, | Performed by: INTERNAL MEDICINE

## 2021-10-26 PROCEDURE — 3008F BODY MASS INDEX DOCD: CPT | Mod: S$GLB,,, | Performed by: INTERNAL MEDICINE

## 2021-10-26 PROCEDURE — 1126F AMNT PAIN NOTED NONE PRSNT: CPT | Mod: S$GLB,,, | Performed by: INTERNAL MEDICINE

## 2021-10-26 PROCEDURE — 3078F PR MOST RECENT DIASTOLIC BLOOD PRESSURE < 80 MM HG: ICD-10-PCS | Mod: S$GLB,,, | Performed by: INTERNAL MEDICINE

## 2021-10-26 PROCEDURE — 99214 PR OFFICE/OUTPT VISIT, EST, LEVL IV, 30-39 MIN: ICD-10-PCS | Mod: S$GLB,,, | Performed by: INTERNAL MEDICINE

## 2021-10-26 PROCEDURE — 3051F HG A1C>EQUAL 7.0%<8.0%: CPT | Mod: S$GLB,,, | Performed by: INTERNAL MEDICINE

## 2021-10-26 PROCEDURE — 3288F PR FALLS RISK ASSESSMENT DOCUMENTED: ICD-10-PCS | Mod: S$GLB,,, | Performed by: INTERNAL MEDICINE

## 2021-10-26 PROCEDURE — 4010F PR ACE/ARB THEARPY RXD/TAKEN: ICD-10-PCS | Mod: S$GLB,,, | Performed by: INTERNAL MEDICINE

## 2021-10-26 PROCEDURE — 3075F SYST BP GE 130 - 139MM HG: CPT | Mod: S$GLB,,, | Performed by: INTERNAL MEDICINE

## 2021-10-26 PROCEDURE — 3008F PR BODY MASS INDEX (BMI) DOCUMENTED: ICD-10-PCS | Mod: S$GLB,,, | Performed by: INTERNAL MEDICINE

## 2021-10-26 PROCEDURE — 1101F PR PT FALLS ASSESS DOC 0-1 FALLS W/OUT INJ PAST YR: ICD-10-PCS | Mod: S$GLB,,, | Performed by: INTERNAL MEDICINE

## 2021-10-26 PROCEDURE — 1101F PT FALLS ASSESS-DOCD LE1/YR: CPT | Mod: S$GLB,,, | Performed by: INTERNAL MEDICINE

## 2021-10-26 PROCEDURE — 3078F DIAST BP <80 MM HG: CPT | Mod: S$GLB,,, | Performed by: INTERNAL MEDICINE

## 2021-10-26 PROCEDURE — 3075F PR MOST RECENT SYSTOLIC BLOOD PRESS GE 130-139MM HG: ICD-10-PCS | Mod: S$GLB,,, | Performed by: INTERNAL MEDICINE

## 2021-10-26 PROCEDURE — 99214 OFFICE O/P EST MOD 30 MIN: CPT | Mod: S$GLB,,, | Performed by: INTERNAL MEDICINE

## 2021-10-26 PROCEDURE — 3288F FALL RISK ASSESSMENT DOCD: CPT | Mod: S$GLB,,, | Performed by: INTERNAL MEDICINE

## 2021-10-26 PROCEDURE — 4010F ACE/ARB THERAPY RXD/TAKEN: CPT | Mod: S$GLB,,, | Performed by: INTERNAL MEDICINE

## 2021-10-26 PROCEDURE — 1160F RVW MEDS BY RX/DR IN RCRD: CPT | Mod: S$GLB,,, | Performed by: INTERNAL MEDICINE

## 2022-01-20 ENCOUNTER — OFFICE VISIT (OUTPATIENT)
Dept: PODIATRY | Facility: CLINIC | Age: 67
End: 2022-01-20
Payer: MEDICARE

## 2022-01-20 VITALS — WEIGHT: 180.5 LBS | BODY MASS INDEX: 28.33 KG/M2 | HEIGHT: 67 IN | RESPIRATION RATE: 16 BRPM

## 2022-01-20 DIAGNOSIS — E11.9 ENCOUNTER FOR DIABETIC FOOT EXAM: ICD-10-CM

## 2022-01-20 DIAGNOSIS — E11.9 TYPE 2 DIABETES MELLITUS WITHOUT COMPLICATION, WITHOUT LONG-TERM CURRENT USE OF INSULIN: Primary | ICD-10-CM

## 2022-01-20 PROCEDURE — 1160F RVW MEDS BY RX/DR IN RCRD: CPT | Mod: CPTII,S$GLB,, | Performed by: PODIATRIST

## 2022-01-20 PROCEDURE — 3288F FALL RISK ASSESSMENT DOCD: CPT | Mod: CPTII,S$GLB,, | Performed by: PODIATRIST

## 2022-01-20 PROCEDURE — 4010F PR ACE/ARB THEARPY RXD/TAKEN: ICD-10-PCS | Mod: CPTII,S$GLB,, | Performed by: PODIATRIST

## 2022-01-20 PROCEDURE — 1159F PR MEDICATION LIST DOCUMENTED IN MEDICAL RECORD: ICD-10-PCS | Mod: CPTII,S$GLB,, | Performed by: PODIATRIST

## 2022-01-20 PROCEDURE — 4010F ACE/ARB THERAPY RXD/TAKEN: CPT | Mod: CPTII,S$GLB,, | Performed by: PODIATRIST

## 2022-01-20 PROCEDURE — 1101F PT FALLS ASSESS-DOCD LE1/YR: CPT | Mod: CPTII,S$GLB,, | Performed by: PODIATRIST

## 2022-01-20 PROCEDURE — 1101F PR PT FALLS ASSESS DOC 0-1 FALLS W/OUT INJ PAST YR: ICD-10-PCS | Mod: CPTII,S$GLB,, | Performed by: PODIATRIST

## 2022-01-20 PROCEDURE — 1159F MED LIST DOCD IN RCRD: CPT | Mod: CPTII,S$GLB,, | Performed by: PODIATRIST

## 2022-01-20 PROCEDURE — 1160F PR REVIEW ALL MEDS BY PRESCRIBER/CLIN PHARMACIST DOCUMENTED: ICD-10-PCS | Mod: CPTII,S$GLB,, | Performed by: PODIATRIST

## 2022-01-20 PROCEDURE — 3051F PR MOST RECENT HEMOGLOBIN A1C LEVEL 7.0 - < 8.0%: ICD-10-PCS | Mod: CPTII,S$GLB,, | Performed by: PODIATRIST

## 2022-01-20 PROCEDURE — 3008F BODY MASS INDEX DOCD: CPT | Mod: CPTII,S$GLB,, | Performed by: PODIATRIST

## 2022-01-20 PROCEDURE — 3288F PR FALLS RISK ASSESSMENT DOCUMENTED: ICD-10-PCS | Mod: CPTII,S$GLB,, | Performed by: PODIATRIST

## 2022-01-20 PROCEDURE — 3051F HG A1C>EQUAL 7.0%<8.0%: CPT | Mod: CPTII,S$GLB,, | Performed by: PODIATRIST

## 2022-01-20 PROCEDURE — 1126F AMNT PAIN NOTED NONE PRSNT: CPT | Mod: CPTII,S$GLB,, | Performed by: PODIATRIST

## 2022-01-20 PROCEDURE — 99213 OFFICE O/P EST LOW 20 MIN: CPT | Mod: S$GLB,,, | Performed by: PODIATRIST

## 2022-01-20 PROCEDURE — 3008F PR BODY MASS INDEX (BMI) DOCUMENTED: ICD-10-PCS | Mod: CPTII,S$GLB,, | Performed by: PODIATRIST

## 2022-01-20 PROCEDURE — 1126F PR PAIN SEVERITY QUANTIFIED, NO PAIN PRESENT: ICD-10-PCS | Mod: CPTII,S$GLB,, | Performed by: PODIATRIST

## 2022-01-20 PROCEDURE — 99213 PR OFFICE/OUTPT VISIT, EST, LEVL III, 20-29 MIN: ICD-10-PCS | Mod: S$GLB,,, | Performed by: PODIATRIST

## 2022-01-20 RX ORDER — FLUTICASONE PROPIONATE 50 MCG
2 SPRAY, SUSPENSION (ML) NASAL DAILY
COMMUNITY
Start: 2021-12-06 | End: 2022-01-20

## 2022-01-20 RX ORDER — CETIRIZINE HYDROCHLORIDE 10 MG/1
TABLET ORAL
COMMUNITY
Start: 2021-12-06 | End: 2023-12-14

## 2022-01-20 RX ORDER — ROSUVASTATIN CALCIUM 20 MG/1
20 TABLET, COATED ORAL DAILY
COMMUNITY
Start: 2021-11-22

## 2022-01-20 NOTE — PROGRESS NOTES
"  [address] [place] [address] [place] [address]  Phone: [phone]   [phone]    Patient's PCP:Gregorio Grijalva MD  Referring Provider: No ref. provider found    Subjective:      Chief Complaint:: Diabetic Foot Exam    HPI  Yan Hernandez Jr. is a 66 y.o. male who presents to the clinic for a diabetic foot exam.   Pt has seen Dr. Gregorio Felipe MD on 10/26/2021 who treats them for their diabetes.  Pt has been a diabetic since 2007.  Taking Metformin to treat diabetes.   Blood sugar: 147  Hemoglobin A1C: 7.1 (06/23/21)      Vitals:    01/20/22 0919   Resp: 16   Weight: 81.9 kg (180 lb 8 oz)   Height: 5' 7" (1.702 m)   PainSc: 0-No pain      Shoe Size: 10    History reviewed. No pertinent surgical history.  Past Medical History:   Diagnosis Date    Acute bronchitis 6/1/2017    Allergy     atorvastatin, Influenza vaccine    Diabetes mellitus     Diabetes mellitus, type 2     Eosinophilic esophagitis 2/18/2020    Based upon endoscopy done by Dr. Clark Sprague.    GERD (gastroesophageal reflux disease)     History of endoscopy 4/6/2021    Dr. Clark Sprague.  Esophagus appears to have mucosal like eosinophilic esophagitis.  Pathology did not indicate any evidence of malignancy.    Hx of rhinoplasty 10/17/2018    Hyperlipidemia     Hypertension      Family History   Problem Relation Age of Onset    Arthritis Mother     Alzheimer's disease Mother     Cancer Father         Social History:   Marital Status: Single  Alcohol History:  reports no history of alcohol use.  Tobacco History:  reports that he has quit smoking. His smoking use included cigarettes. He has never used smokeless tobacco.  Drug History:  reports no history of drug use.    Review of patient's allergies indicates:   Allergen Reactions    Lisinopril Other (See Comments)     Causes dizziness.    Influenza virus vaccines      Gets sick with shots       Current Outpatient Medications   Medication Sig Dispense Refill    fenofibrate 160 " MG Tab Take 1 tablet (160 mg total) by mouth every evening. 90 tablet 2    losartan (COZAAR) 50 MG tablet Take 1 tablet by mouth once daily 30 tablet 8    metFORMIN (GLUCOPHAGE) 850 MG tablet Take 1 tablet (850 mg total) by mouth 2 (two) times daily with meals. 180 tablet 2    paroxetine (PAXIL-CR) 25 MG 24 hr tablet Take 1 tablet by mouth once daily 90 tablet 0    rosuvastatin (CRESTOR) 20 MG tablet Take 20 mg by mouth once daily.      aspirin (ECOTRIN) 81 MG EC tablet Take 1 tablet (81 mg total) by mouth once daily. (Patient not taking: Reported on 1/20/2022) 100 tablet 4    cetirizine (ZYRTEC) 10 MG tablet TAKE 1 TABLET BY MOUTH ONCE DAILY AS NEEDED FOR ALLERGIES       No current facility-administered medications for this visit.       Review of Systems   Constitutional: Negative for chills, fatigue, fever and unexpected weight change.   HENT: Negative for hearing loss and trouble swallowing.    Eyes: Negative for photophobia and visual disturbance.   Respiratory: Negative for cough, shortness of breath and wheezing.    Cardiovascular: Negative for chest pain, palpitations and leg swelling.   Gastrointestinal: Negative for abdominal pain and nausea.   Genitourinary: Negative for dysuria and frequency.   Musculoskeletal: Negative for arthralgias, back pain, gait problem, joint swelling and myalgias.   Skin: Negative for rash and wound.   Neurological: Negative for tremors, seizures, weakness, numbness and headaches.   Hematological: Does not bruise/bleed easily.         Objective:        Physical Exam:   Foot Exam    General  General Appearance: appears stated age and healthy   Orientation: alert and oriented to person, place, and time   Affect: appropriate   Gait: unimpaired       Right Foot/Ankle     Inspection and Palpation  Ecchymosis: none  Tenderness: none   Swelling: none   Arch: normal  Hammertoes: absent  Claw Toes: absent  Hallux valgus: no  Hallux limitus: no  Skin Exam: skin intact;    Neurovascular  Dorsalis pedis: 2+  Posterior tibial: 2+  Capillary Refill: 2+  Saphenous nerve sensation: normal  Tibial nerve sensation: normal  Superficial peroneal nerve sensation: normal  Deep peroneal nerve sensation: normal  Sural nerve sensation: normal    Muscle Strength  Ankle dorsiflexion: 5  Ankle plantar flexion: 5  Ankle inversion: 5  Ankle eversion: 5  Great toe extension: 5  Great toe flexion: 5    Range of Motion    Normal right ankle ROM      Left Foot/Ankle      Inspection and Palpation  Ecchymosis: none  Tenderness: none   Swelling: none   Arch: normal  Hammertoes: absent  Claw toes: absent  Hallux valgus: no  Hallux limitus: no  Skin Exam: skin intact;   Neurovascular  Dorsalis pedis: 2+  Posterior tibial: 2+  Capillary refill: 2+  Saphenous nerve sensation: normal  Tibial nerve sensation: normal  Superficial peroneal nerve sensation: normal  Deep peroneal nerve sensation: normal  Sural nerve sensation: normal    Muscle Strength  Ankle dorsiflexion: 5  Ankle plantar flexion: 5  Ankle inversion: 5  Ankle eversion: 5  Great toe extension: 5  Great toe flexion: 5    Range of Motion    Normal left ankle ROM          Physical Exam  Cardiovascular:      Pulses:           Dorsalis pedis pulses are 2+ on the right side and 2+ on the left side.        Posterior tibial pulses are 2+ on the right side and 2+ on the left side.   Musculoskeletal:      Right foot: No bunion.      Left foot: No bunion.         Imaging:            Assessment:       1. Type 2 diabetes mellitus without complication, without long-term current use of insulin    2. Encounter for diabetic foot exam      Plan:   Type 2 diabetes mellitus without complication, without long-term current use of insulin    Encounter for diabetic foot exam    I evaluated patient he has consider be low risk of loss of limb secondary to normal circulation and sensation no pre ulcerative lesions or deformities.  He has a once a year diabetic examination.  He  return in 1 year or as needed      Procedures          Counseling:   Counseling/Education:  I provided patient education verbally regarding:   The aspects of diabetes and how it pertains to the feet. I explained the importance of proper diabetic foot care and how it is essential for the health of their feet.    I discussed the importance of knowing their Hemoglobin A1c and that the level needs to be as close to 6 as possible. I discussed the increase complications of high blood sugar including stroke, blindness, heart attack, kidney failure and loss of limb secondary to neuropathy and PVD.     With neuropathy, beware of any breaks in the skin or redness. These areas are not recognized early due to the numbness.    I discussed Diabetes, lower back issues, metabolic disorders, systemic causes, chemotherapy, vitamin deficiency, heavy metal exposure, as some of the causes. I also explained that as much as 40% of the time we can not find a cause. I discussed different treatments available to control the symptoms but which may not cure the problem.     I provided patient education verbally regarding:   Patient diagnosis, treatment options, as well as alternatives, risks, and benefits.     This note was created using Dragon voice recognition software that occasionally misinterpreted phrases or words.

## 2022-01-20 NOTE — PATIENT INSTRUCTIONS
Your Diabetes Foot Care Program    Every day you depend on your feet to keep you moving. But when you have diabetes, your feet need special care. Even a small foot problem can become very serious. So dont take your feet for granted. By working with your diabetes healthcare team, you can learn how to protect your feet and keep them healthy.  Evaluating your feet  An evaluation helps your healthcare provider check the condition of your feet. The evaluation includes a review of your diabetes history and overall health. It may also include a foot exam, X-rays, or other tests. These can help show problems beneath the skin that you cant see or feel.  Medical history  You will be asked about your overall health and any history of foot problems. Youll also discuss your diabetes history, such as whether your blood sugar level has changed over time. It also includes questions about sensations of pain, tingling, pins and needles, or numbness. Your healthcare provider will also want to know if you have high blood pressure and heart disease, or if you smoke. Be sure to mention any medicines (including over-the-counter), supplements, or herbal remedies you take.  Foot exam  A foot exam checks the condition of different parts of your foot. First, your skin and nails are examined for any signs of infection. Blood flow is checked by feeling for the pulses in each foot. You may also have tests to study the nerves in the foot. These include using a small filament (wire) to see how sensitive your feet are. In certain cases, you will be asked to walk a short distance to check for bone, joint, and muscle problems.  Diagnostic tests  If needed, your healthcare provider will suggest certain tests to learn more about your feet. These include:  · Doppler tests to measure blood flow in the feet and lower leg.  · X-rays, which can show bone or joint problems.  · Other imaging tests, such as an MRI (magnetic resonance imaging), bone scan,  and CT (computed tomography) scan. These can help show bone infections.  · Other tests, such as vascular tests, which study the blood flow in your feet and legs. You may also have nerve studies to learn how sensitive your feet are.  Creating a foot care program  Based on the evaluation, your healthcare provider will create a foot care program for you. Your program may be as simple as starting a daily self-care routine and changing the types of shoes your wear. It may also involve treating minor foot problems, such as a corn or blister. In some cases, surgery will be needed to treat an infection or mechanical problems, such as hammer toes.  Preventing problems  When you have diabetes, its easier to prevent problems than to treat them later on. So see your healthcare team for regular checkups and foot care. Your healthcare team can also help you learn more about caring for your feet at home. For example, you may be told to avoid walking barefoot. Or you may be told that special footwear is needed to protect your feet.  Have regular checkups  Foot problems can develop quickly. So be sure to follow your healthcare teams schedule for regular checkups. During office visits, take off your shoes and socks as soon as you get in the exam room. Ask your healthcare provider to examine your feet for problems. This will make it easier to find and treat small skin irritations before they get worse. Regular checkups can also help keep track of the blood flow and feeling in your feet. If you have neuropathy (lack of feeling in your feet), you will need to have checkups more often.  Learn about self-care  The more you know about diabetes and your feet, the easier it will be to prevent problems. Members of your healthcare team can teach you how to inspect your feet and teach you to look for warning signs. They can also give you other foot care tips. During office visits, be sure to ask any questions you have.  Date Last Reviewed:  7/1/2016  © 2698-6298 [employer]. [address] [place] [address], [place] [address]. All rights reserved. This information is not intended as a substitute for professional medical care. Always follow your healthcare professional's instructions.

## 2022-03-03 ENCOUNTER — LAB VISIT (OUTPATIENT)
Dept: LAB | Facility: HOSPITAL | Age: 67
End: 2022-03-03
Attending: INTERNAL MEDICINE
Payer: MEDICARE

## 2022-03-03 DIAGNOSIS — I10 ESSENTIAL HYPERTENSION: ICD-10-CM

## 2022-03-03 DIAGNOSIS — E11.9 TYPE 2 DIABETES MELLITUS WITHOUT COMPLICATION, WITHOUT LONG-TERM CURRENT USE OF INSULIN: ICD-10-CM

## 2022-03-03 LAB
ALBUMIN/CREAT UR: 7.1 UG/MG (ref 0–30)
ANION GAP SERPL CALC-SCNC: 8 MMOL/L (ref 8–16)
BUN SERPL-MCNC: 16 MG/DL (ref 8–23)
CALCIUM SERPL-MCNC: 9.8 MG/DL (ref 8.7–10.5)
CHLORIDE SERPL-SCNC: 102 MMOL/L (ref 95–110)
CO2 SERPL-SCNC: 27 MMOL/L (ref 23–29)
CREAT SERPL-MCNC: 0.8 MG/DL (ref 0.5–1.4)
CREAT UR-MCNC: 82 MG/DL (ref 23–375)
EST. GFR  (AFRICAN AMERICAN): >60 ML/MIN/1.73 M^2
EST. GFR  (NON AFRICAN AMERICAN): >60 ML/MIN/1.73 M^2
ESTIMATED AVG GLUCOSE: NORMAL MG/DL (ref 68–131)
GLUCOSE SERPL-MCNC: 100 MG/DL (ref 70–110)
HBA1C MFR BLD: 7.2 %
HBA1C MFR BLD: NORMAL % (ref 4.5–6.2)
MICROALBUMIN UR DL<=1MG/L-MCNC: 5.8 UG/ML
POTASSIUM SERPL-SCNC: 3.9 MMOL/L (ref 3.5–5.1)
SODIUM SERPL-SCNC: 137 MMOL/L (ref 136–145)

## 2022-03-03 PROCEDURE — 83036 HEMOGLOBIN GLYCOSYLATED A1C: CPT | Performed by: INTERNAL MEDICINE

## 2022-03-03 PROCEDURE — 30000890 LABCORP MISCELLANEOUS TEST: Performed by: INTERNAL MEDICINE

## 2022-03-03 PROCEDURE — 36415 COLL VENOUS BLD VENIPUNCTURE: CPT | Performed by: INTERNAL MEDICINE

## 2022-03-03 PROCEDURE — 82570 ASSAY OF URINE CREATININE: CPT | Performed by: INTERNAL MEDICINE

## 2022-03-03 PROCEDURE — 82043 UR ALBUMIN QUANTITATIVE: CPT | Performed by: INTERNAL MEDICINE

## 2022-03-03 PROCEDURE — 80048 BASIC METABOLIC PNL TOTAL CA: CPT | Performed by: INTERNAL MEDICINE

## 2022-03-05 LAB
LABCORP MISC TEST CODE: 1453
LABCORP MISC TEST NAME: NORMAL
LABCORP MISCELLANEOUS TEST: NORMAL

## 2022-03-07 ENCOUNTER — TELEPHONE (OUTPATIENT)
Dept: FAMILY MEDICINE | Facility: CLINIC | Age: 67
End: 2022-03-07
Payer: MEDICARE

## 2022-03-07 NOTE — TELEPHONE ENCOUNTER
----- Message from Gregorio Grijalva MD sent at 3/5/2022 10:14 AM CST -----  The results are within acceptable range.  Please keep regular follow up.

## 2022-03-08 ENCOUNTER — OFFICE VISIT (OUTPATIENT)
Dept: FAMILY MEDICINE | Facility: CLINIC | Age: 67
End: 2022-03-08
Payer: MEDICARE

## 2022-03-08 VITALS
HEIGHT: 67 IN | BODY MASS INDEX: 28.88 KG/M2 | DIASTOLIC BLOOD PRESSURE: 73 MMHG | SYSTOLIC BLOOD PRESSURE: 132 MMHG | HEART RATE: 80 BPM | WEIGHT: 184 LBS

## 2022-03-08 DIAGNOSIS — K21.9 GASTROESOPHAGEAL REFLUX DISEASE WITHOUT ESOPHAGITIS: Chronic | ICD-10-CM

## 2022-03-08 DIAGNOSIS — I10 ESSENTIAL HYPERTENSION: Primary | ICD-10-CM

## 2022-03-08 DIAGNOSIS — E11.9 TYPE 2 DIABETES MELLITUS WITHOUT COMPLICATION, WITHOUT LONG-TERM CURRENT USE OF INSULIN: Chronic | ICD-10-CM

## 2022-03-08 DIAGNOSIS — F41.1 ANXIETY, GENERALIZED: Chronic | ICD-10-CM

## 2022-03-08 DIAGNOSIS — Z12.5 SCREENING FOR PROSTATE CANCER: ICD-10-CM

## 2022-03-08 DIAGNOSIS — E78.00 HYPERCHOLESTEREMIA: Chronic | ICD-10-CM

## 2022-03-08 PROCEDURE — 3075F SYST BP GE 130 - 139MM HG: CPT | Mod: S$GLB,,, | Performed by: INTERNAL MEDICINE

## 2022-03-08 PROCEDURE — 1126F AMNT PAIN NOTED NONE PRSNT: CPT | Mod: S$GLB,,, | Performed by: INTERNAL MEDICINE

## 2022-03-08 PROCEDURE — 1159F PR MEDICATION LIST DOCUMENTED IN MEDICAL RECORD: ICD-10-PCS | Mod: S$GLB,,, | Performed by: INTERNAL MEDICINE

## 2022-03-08 PROCEDURE — 99214 PR OFFICE/OUTPT VISIT, EST, LEVL IV, 30-39 MIN: ICD-10-PCS | Mod: S$GLB,,, | Performed by: INTERNAL MEDICINE

## 2022-03-08 PROCEDURE — 3051F HG A1C>EQUAL 7.0%<8.0%: CPT | Mod: S$GLB,,, | Performed by: INTERNAL MEDICINE

## 2022-03-08 PROCEDURE — 3066F NEPHROPATHY DOC TX: CPT | Mod: S$GLB,,, | Performed by: INTERNAL MEDICINE

## 2022-03-08 PROCEDURE — 1160F PR REVIEW ALL MEDS BY PRESCRIBER/CLIN PHARMACIST DOCUMENTED: ICD-10-PCS | Mod: S$GLB,,, | Performed by: INTERNAL MEDICINE

## 2022-03-08 PROCEDURE — 1160F RVW MEDS BY RX/DR IN RCRD: CPT | Mod: S$GLB,,, | Performed by: INTERNAL MEDICINE

## 2022-03-08 PROCEDURE — 1159F MED LIST DOCD IN RCRD: CPT | Mod: S$GLB,,, | Performed by: INTERNAL MEDICINE

## 2022-03-08 PROCEDURE — 3008F BODY MASS INDEX DOCD: CPT | Mod: S$GLB,,, | Performed by: INTERNAL MEDICINE

## 2022-03-08 PROCEDURE — 1101F PR PT FALLS ASSESS DOC 0-1 FALLS W/OUT INJ PAST YR: ICD-10-PCS | Mod: S$GLB,,, | Performed by: INTERNAL MEDICINE

## 2022-03-08 PROCEDURE — 1101F PT FALLS ASSESS-DOCD LE1/YR: CPT | Mod: S$GLB,,, | Performed by: INTERNAL MEDICINE

## 2022-03-08 PROCEDURE — 99214 OFFICE O/P EST MOD 30 MIN: CPT | Mod: S$GLB,,, | Performed by: INTERNAL MEDICINE

## 2022-03-08 PROCEDURE — 3046F HEMOGLOBIN A1C LEVEL >9.0%: CPT | Mod: S$GLB,,, | Performed by: INTERNAL MEDICINE

## 2022-03-08 PROCEDURE — 4010F ACE/ARB THERAPY RXD/TAKEN: CPT | Mod: S$GLB,,, | Performed by: INTERNAL MEDICINE

## 2022-03-08 PROCEDURE — 3008F PR BODY MASS INDEX (BMI) DOCUMENTED: ICD-10-PCS | Mod: S$GLB,,, | Performed by: INTERNAL MEDICINE

## 2022-03-08 PROCEDURE — 3066F PR DOCUMENTATION OF TREATMENT FOR NEPHROPATHY: ICD-10-PCS | Mod: S$GLB,,, | Performed by: INTERNAL MEDICINE

## 2022-03-08 PROCEDURE — 3288F FALL RISK ASSESSMENT DOCD: CPT | Mod: S$GLB,,, | Performed by: INTERNAL MEDICINE

## 2022-03-08 PROCEDURE — 3075F PR MOST RECENT SYSTOLIC BLOOD PRESS GE 130-139MM HG: ICD-10-PCS | Mod: S$GLB,,, | Performed by: INTERNAL MEDICINE

## 2022-03-08 PROCEDURE — 3078F DIAST BP <80 MM HG: CPT | Mod: S$GLB,,, | Performed by: INTERNAL MEDICINE

## 2022-03-08 PROCEDURE — 3061F NEG MICROALBUMINURIA REV: CPT | Mod: S$GLB,,, | Performed by: INTERNAL MEDICINE

## 2022-03-08 PROCEDURE — 3051F PR MOST RECENT HEMOGLOBIN A1C LEVEL 7.0 - < 8.0%: ICD-10-PCS | Mod: S$GLB,,, | Performed by: INTERNAL MEDICINE

## 2022-03-08 PROCEDURE — 3288F PR FALLS RISK ASSESSMENT DOCUMENTED: ICD-10-PCS | Mod: S$GLB,,, | Performed by: INTERNAL MEDICINE

## 2022-03-08 PROCEDURE — 3046F PR MOST RECENT HEMOGLOBIN A1C LEVEL > 9.0%: ICD-10-PCS | Mod: S$GLB,,, | Performed by: INTERNAL MEDICINE

## 2022-03-08 PROCEDURE — 3078F PR MOST RECENT DIASTOLIC BLOOD PRESSURE < 80 MM HG: ICD-10-PCS | Mod: S$GLB,,, | Performed by: INTERNAL MEDICINE

## 2022-03-08 PROCEDURE — 1126F PR PAIN SEVERITY QUANTIFIED, NO PAIN PRESENT: ICD-10-PCS | Mod: S$GLB,,, | Performed by: INTERNAL MEDICINE

## 2022-03-08 PROCEDURE — 4010F PR ACE/ARB THEARPY RXD/TAKEN: ICD-10-PCS | Mod: S$GLB,,, | Performed by: INTERNAL MEDICINE

## 2022-03-08 PROCEDURE — 3061F PR NEG MICROALBUMINURIA RESULT DOCUMENTED/REVIEW: ICD-10-PCS | Mod: S$GLB,,, | Performed by: INTERNAL MEDICINE

## 2022-03-08 RX ORDER — METFORMIN HYDROCHLORIDE 1000 MG/1
1000 TABLET ORAL 2 TIMES DAILY WITH MEALS
Qty: 180 TABLET | Refills: 2 | Status: SHIPPED | OUTPATIENT
Start: 2022-03-08 | End: 2023-01-09

## 2022-03-08 NOTE — PROGRESS NOTES
Subjective:       Patient ID: Yan Hernandez Jr. is a 66 y.o. male.    Chief Complaint: Diabetes (Lab review), Hypertension, Hyperlipidemia, Gastroesophageal Reflux, Mood Disorder, and Leg Swelling    Mr. Heredia is a 66-year-old gentleman who comes for follow-up.    Underlying medical issues include hypertension, type 2 diabetes mellitus, dyslipidemia and mood disorder with some degree of anxiety.    Overall he is doing okay.    His recent hemoglobin A1c 7.2.  Blood pressures are doing okay.  No major prostate symptoms.    He has been taking Paxil for several years now.  This seems to be stabilizing his mood.  I did have a discussion about as to what bothers him.  Generally he is a happy go della person at this point after halfway.  The world politics and the COVID-19 situation certainly does not help.  I did also talk to him about for consideration of slow weaning off this medication and see how he does overall.  He is open to this thought though there is a mutual feeling that he may relapse back into anxiety.  However he will give a thought about this.  While he was working in past, he seems to be somewhat more anxious.  With halfway hopefully some of that anxiety has abated as related to work but certainly the current world issues may not be conducive.    Hypertension  This is a chronic problem. The current episode started more than 1 year ago. The problem is controlled. Associated symptoms include anxiety and headaches (Occasional headaches). Pertinent negatives include no chest pain, palpitations or shortness of breath. Risk factors for coronary artery disease include male gender, diabetes mellitus and obesity. Past treatments include angiotensin blockers. The current treatment provides moderate improvement. Compliance problems include psychosocial issues.  There is no history of chronic renal disease, hyperaldosteronism or renovascular disease.   Diabetes  He presents for his follow-up diabetic visit. He  has type 2 diabetes mellitus. His disease course has been stable. Hypoglycemia symptoms include headaches (Occasional headaches) and nervousness/anxiousness. Pertinent negatives for hypoglycemia include no confusion, dizziness, pallor, seizures or speech difficulty. Pertinent negatives for diabetes include no chest pain, no fatigue, no polydipsia, no polyphagia, no polyuria and no weakness. There are no hypoglycemic complications. There are no diabetic complications. Risk factors for coronary artery disease include male sex, obesity, hypertension, dyslipidemia and diabetes mellitus. Current diabetic treatment includes oral agent (monotherapy) (Metformin 850 mg b.i.d.). He is compliant with treatment some of the time. He participates in exercise intermittently. An ACE inhibitor/angiotensin II receptor blocker is being taken. He does not see a podiatrist.  Hyperlipidemia  This is a chronic problem. The current episode started more than 1 year ago. The problem is controlled. He has no history of chronic renal disease or obesity. Pertinent negatives include no chest pain or shortness of breath. Current antihyperlipidemic treatment includes statins and fibric acid derivatives. Risk factors for coronary artery disease include hypertension, male sex, dyslipidemia and diabetes mellitus.   Anxiety  Presents for follow-up visit. Symptoms include nervous/anxious behavior. Patient reports no chest pain, confusion, depressed mood, dizziness, excessive worry, nausea, palpitations, shortness of breath or suicidal ideas. Primary symptoms comment: Currently stable on Paxil..           Past Medical History:   Diagnosis Date    Acute bronchitis 6/1/2017    Allergy     atorvastatin, Influenza vaccine    Diabetes mellitus     Diabetes mellitus, type 2     Eosinophilic esophagitis 2/18/2020    Based upon endoscopy done by Dr. Clark Sprague.    GERD (gastroesophageal reflux disease)     History of endoscopy 4/6/2021      Clark Sprageu.  Esophagus appears to have mucosal like eosinophilic esophagitis.  Pathology did not indicate any evidence of malignancy.    Hx of rhinoplasty 10/17/2018    Hyperlipidemia     Hypertension      Social History     Socioeconomic History    Marital status: Single    Number of children: 0   Occupational History    Occupation: [employer]     Employer: [employer]   Tobacco Use    Smoking status: Former Smoker     Types: Cigarettes    Smokeless tobacco: Never Used   Substance and Sexual Activity    Alcohol use: No    Drug use: No    Sexual activity: Not Currently     Social Determinants of Health     Financial Resource Strain: Medium Risk    Difficulty of Paying Living Expenses: Somewhat hard   Food Insecurity: No Food Insecurity    Worried About Running Out of Food in the Last Year: Never true    Ran Out of Food in the Last Year: Never true   Transportation Needs: No Transportation Needs    Lack of Transportation (Medical): No    Lack of Transportation (Non-Medical): No   Physical Activity: Insufficiently Active    Days of Exercise per Week: 3 days    Minutes of Exercise per Session: 40 min   Stress: Stress Concern Present    Feeling of Stress : To some extent   Social Connections: Socially Isolated    Frequency of Communication with Friends and Family: Twice a week    Frequency of Social Gatherings with Friends and Family: Once a week    Attends Religion Services: Never    Active Member of Clubs or Organizations: No    Attends Club or Organization Meetings: Never    Marital Status: Never    Housing Stability: Low Risk     Unable to Pay for Housing in the Last Year: No    Number of Places Lived in the Last Year: 1    Unstable Housing in the Last Year: No     History reviewed. No pertinent surgical history.  Family History   Problem Relation Age of Onset    Arthritis Mother     Alzheimer's disease Mother     Cancer Father        Review of Systems  "  Constitutional: Negative for activity change, appetite change, fatigue, fever and unexpected weight change (gained 4 lbs).        Patient has lost 6 lb of weight.  Patient is not aware of it.   HENT: Positive for hearing loss (both sides). Negative for congestion, sneezing, tinnitus and trouble swallowing.    Eyes: Negative for pain, discharge and visual disturbance.   Respiratory: Negative for apnea, cough, chest tightness, shortness of breath and stridor.    Cardiovascular: Negative for chest pain, palpitations and leg swelling.        Hypertension stable.   Gastrointestinal: Negative for abdominal distention, abdominal pain, blood in stool, constipation, diarrhea, nausea and vomiting.        Gastroesophageal reflux with LA grade 1 esophagitis.  Currently not on any medications.  Watching his diet.   Endocrine: Negative for cold intolerance, heat intolerance, polydipsia, polyphagia and polyuria.        Diabetes mellitus type2.. Hyperlipidemia.   Genitourinary: Negative for dysuria, hematuria and scrotal swelling.   Musculoskeletal: Negative for arthralgias, back pain and gait problem.   Skin: Negative for pallor, rash and wound.   Allergic/Immunologic: Negative for environmental allergies, food allergies and immunocompromised state.   Neurological: Positive for headaches (Occasional headaches). Negative for dizziness, seizures, speech difficulty, weakness, light-headedness and numbness.        Dizziness and headaches have dissipated   Hematological: Negative for adenopathy. Does not bruise/bleed easily.   Psychiatric/Behavioral: Negative for agitation, behavioral problems, confusion and suicidal ideas. The patient is nervous/anxious.         History of anxiety stable on Paxil.         Objective:      Blood pressure 132/73, pulse 80, height 5' 7" (1.702 m), weight 83.5 kg (184 lb). Body mass index is 28.82 kg/m².  Physical Exam  Vitals and nursing note reviewed.   Constitutional:       General: He is not in acute " distress.     Appearance: He is well-developed. He is obese. He is not ill-appearing, toxic-appearing or diaphoretic.      Comments: BMI 28.82   HENT:      Head: Normocephalic and atraumatic.   Eyes:      General: No scleral icterus.  Neck:      Thyroid: No thyromegaly.      Vascular: No carotid bruit or JVD.      Trachea: No tracheal deviation.   Cardiovascular:      Rate and Rhythm: Normal rate and regular rhythm.      Heart sounds: Normal heart sounds. No murmur heard.    No friction rub. No gallop.   Pulmonary:      Effort: Pulmonary effort is normal.      Breath sounds: Normal breath sounds.   Abdominal:      General: Bowel sounds are normal.      Palpations: Abdomen is soft. There is no mass.      Hernia: No hernia is present.   Musculoskeletal:      Cervical back: Normal range of motion and neck supple. No muscular tenderness.      Right lower leg: No edema.      Left lower leg: No edema.      Right foot: Normal range of motion. No deformity, bunion or Charcot foot.      Left foot: Normal range of motion. No deformity, bunion or Charcot foot.        Feet:       Comments: Examination of the joints do not show any evidence of arthritis or swelling.   Feet:      Right foot:      Protective Sensation: 5 sites tested. 5 sites sensed.      Left foot:      Protective Sensation: 5 sites tested. 5 sites sensed.   Lymphadenopathy:      Cervical: No cervical adenopathy.   Skin:     General: Skin is warm and dry.      Findings: No lesion or rash.   Neurological:      Mental Status: He is alert. Mental status is at baseline.      Gait: Gait and tandem walk normal.   Psychiatric:         Behavior: Behavior normal.      Comments: Historically patient has pain appropriate with some degree of anxiety and perhaps mild depression but not certainly to the psychotic proportions.           Assessment:       1. Essential hypertension    2. Type 2 diabetes mellitus without complication, without long-term current use of insulin    3.  Hypercholesteremia    4. Gastroesophageal reflux disease without esophagitis    5. Anxiety, generalized    6. Screening for prostate cancer           Lab Visit on 03/03/2022   Component Date Value Ref Range Status    Hemoglobin A1C 03/03/2022 SEE COMMENT  4.5 - 6.2 % Final    Estimated Avg Glucose 03/03/2022 SEE COMMENT  68 - 131 mg/dL Final    Sodium 03/03/2022 137  136 - 145 mmol/L Final    Potassium 03/03/2022 3.9  3.5 - 5.1 mmol/L Final    Chloride 03/03/2022 102  95 - 110 mmol/L Final    CO2 03/03/2022 27  23 - 29 mmol/L Final    Glucose 03/03/2022 100  70 - 110 mg/dL Final    BUN 03/03/2022 16  8 - 23 mg/dL Final    Creatinine 03/03/2022 0.8  0.5 - 1.4 mg/dL Final    Calcium 03/03/2022 9.8  8.7 - 10.5 mg/dL Final    Anion Gap 03/03/2022 8  8 - 16 mmol/L Final    eGFR if African American 03/03/2022 >60.0  >60 mL/min/1.73 m^2 Final    eGFR if non African American 03/03/2022 >60.0  >60 mL/min/1.73 m^2 Final    Microalbumin, Urine 03/03/2022 5.8  <19.9 ug/mL Final    Creatinine, Urine 03/03/2022 82.0  23.0 - 375.0 mg/dL Final    Microalb/Creat Ratio 03/03/2022 7.1  0.0 - 30.0 ug/mg Final    [facility] Miscellaneous Test 03/03/2022 COMMENT   Final    [facility] Test Code: 03/03/2022 [id number]   Final    [facility] Test Name: 03/03/2022 Hemoglobin A1c   Final         Plan:           Essential hypertension  Comments:  Blood pressures are doing good.    Type 2 diabetes mellitus without complication, without long-term current use of insulin  Comments:  Recent hemoglobin A1c 7.2.  Plan is to increase metformin to 1000 mg twice a day.  Recheck A1c in 4 months.  Orders:  -     metFORMIN (GLUCOPHAGE) 1000 MG tablet; Take 1 tablet (1,000 mg total) by mouth 2 (two) times daily with meals.  Dispense: 180 tablet; Refill: 2  -     Hemoglobin A1C; Future; Expected date: 06/13/2022    Hypercholesteremia  Comments:  Currently on a combination of fenofibrate and statin.  Tolerating well.  Consider stopping fenofibrate in  future.    Gastroesophageal reflux disease without esophagitis  Comments:  Sometimes it acts up.  He is not on any medications for the same.    Anxiety, generalized  Comments:  Today I initiated the talks about consideration for slow weaning and tapering of paroxetine.  He is open to this consideration but generally has been anxious al    Screening for prostate cancer  -     PSA, Screening; Future; Expected date: 06/13/2022      Patient's blood pressures are doing okay.    Recent hemoglobin A1c 7.2 and I will increase the metformin to 1000 mg twice a day after he has finished his 850 mg b.i.d..    He has gained 4 lb of weight since the last time and he will continue to watch his diet.    Overall he is seems to be happy person after his group home.  He continues on Paxil 25 mg per day.    His happiness seems to be fairly stable on paroxetine 25 mg. Certain things in the world including current politics and the world situation continue to bother him.  The cost of gasoline going high also bothers him.  He tries not to be involved any road rage matters.    I did have a talk about perhaps considering weaning off the paroxetine and he will think about it.    If all goes well I will see him back in 4 months time.  Check A1c at that point.    Spent cheli 30 minutes with patient which involved review of pts medical conditions, labs, medications and with 50% of time face-to-face discussion about medical problems, management and any applicable changes.      Current Outpatient Medications:     aspirin (ECOTRIN) 81 MG EC tablet, Take 1 tablet (81 mg total) by mouth once daily., Disp: 100 tablet, Rfl: 4    cetirizine (ZYRTEC) 10 MG tablet, TAKE 1 TABLET BY MOUTH ONCE DAILY AS NEEDED FOR ALLERGIES, Disp: , Rfl:     fenofibrate 160 MG Tab, Take 1 tablet (160 mg total) by mouth every evening., Disp: 90 tablet, Rfl: 2    losartan (COZAAR) 50 MG tablet, Take 1 tablet by mouth once daily, Disp: 30 tablet, Rfl: 8    paroxetine  (PAXIL-CR) 25 MG 24 hr tablet, Take 1 tablet by mouth once daily, Disp: 90 tablet, Rfl: 0    rosuvastatin (CRESTOR) 20 MG tablet, Take 20 mg by mouth once daily., Disp: , Rfl:     metFORMIN (GLUCOPHAGE) 1000 MG tablet, Take 1 tablet (1,000 mg total) by mouth 2 (two) times daily with meals., Disp: 180 tablet, Rfl: 2

## 2022-04-28 NOTE — PROGRESS NOTES
Notify patient that his hemoglobin A1c 7.2 which is more or less the same as last time.  He needs to continue to keep on watching his diet and exercise.  Target hemoglobin A1c should be 6.5 or less.

## 2022-05-02 ENCOUNTER — TELEPHONE (OUTPATIENT)
Dept: FAMILY MEDICINE | Facility: CLINIC | Age: 67
End: 2022-05-02

## 2022-05-02 NOTE — TELEPHONE ENCOUNTER
----- Message from Gregorio Grijalva MD sent at 4/28/2022  7:50 AM CDT -----  Notify patient that his hemoglobin A1c 7.2 which is slightly more elevated as compared to last time when it was 7.1.  Target hemoglobin A1c should be less than 6.5.  Please continue to watch diet and exercise.

## 2022-07-16 NOTE — PROGRESS NOTES
Subjective:       Patient ID: Yan Hernandez Jr. is a 67 y.o. male.    Chief Complaint: Follow-up, Hypertension, Hyperlipidemia, Anxiety (Mood disorder/), Diabetes, and Gastroesophageal Reflux    Patient is a 67-year-old gentleman who comes for follow-up.  Underlying medical issues are as below:-    1. Essential hypertension   2. Type 2 diabetes mellitus without complication, without long-term current use of insulin   3. Hypercholesteremia   4. Gastroesophageal reflux disease without esophagitis   5. Anxiety, generalized   6. Positive MEDARDO (antinuclear antibody)     Last visit about 3 months back his hemoglobin A1c was 7.1.  He is fairly stable with his medical conditions.  Not very strict on diet and exercise.    Last visit we had discussed about his longstanding anxiety especially when he was working and if he had reached his station and junction in his life when situation is easing and he may consider weaning off the medications slowly.  Due to prevailing political situation in the country as well as COVID-19 situations and at that point he did not feel he was ready to wean off.    Today preventive care issues also have been reviewed including shingles vaccine, pneumonia vaccine ophthalmology examination and foot examination has been done today.    Social determinants of health care also have been reviewed.  Mostly the concerns are for some degree of social isolation given his single status and also lack of exercise.  Stress also has a concern which has been chronic      Hypertension  This is a chronic problem. The current episode started more than 1 year ago. The problem is controlled. Associated symptoms include anxiety and headaches (Occasional headaches). Pertinent negatives include no chest pain, palpitations or shortness of breath. Risk factors for coronary artery disease include male gender, diabetes mellitus and obesity. Past treatments include angiotensin blockers. The current treatment provides moderate  improvement. Compliance problems include psychosocial issues.  There is no history of chronic renal disease, hyperaldosteronism or renovascular disease.   Diabetes  He presents for his follow-up diabetic visit. He has type 2 diabetes mellitus. His disease course has been stable. Hypoglycemia symptoms include headaches (Occasional headaches) and nervousness/anxiousness. Pertinent negatives for hypoglycemia include no confusion, dizziness, pallor, seizures or speech difficulty. Pertinent negatives for diabetes include no chest pain, no fatigue, no polydipsia, no polyphagia, no polyuria and no weakness. There are no hypoglycemic complications. There are no diabetic complications. Risk factors for coronary artery disease include male sex, obesity, hypertension, dyslipidemia and diabetes mellitus. Current diabetic treatment includes oral agent (monotherapy) (Metformin 850 mg b.i.d.). He is compliant with treatment some of the time. He participates in exercise intermittently. An ACE inhibitor/angiotensin II receptor blocker is being taken. He does not see a podiatrist.  Hyperlipidemia  This is a chronic problem. The current episode started more than 1 year ago. The problem is controlled. He has no history of chronic renal disease or obesity. Pertinent negatives include no chest pain or shortness of breath. Current antihyperlipidemic treatment includes statins and fibric acid derivatives. Risk factors for coronary artery disease include hypertension, male sex, dyslipidemia and diabetes mellitus.   Anxiety  Presents for follow-up visit. Symptoms include nervous/anxious behavior. Patient reports no chest pain, confusion, depressed mood, dizziness, excessive worry, nausea, palpitations, shortness of breath or suicidal ideas. Primary symptoms comment: Currently stable on Paxil..       Back Pain  This is a chronic problem. The current episode started more than 1 year ago. The problem has been gradually worsening since onset.  The pain is present in the lumbar spine. The quality of the pain is described as cramping. The symptoms are aggravated by twisting and standing. Associated symptoms include headaches (Occasional headaches). Pertinent negatives include no abdominal pain, chest pain, dysuria, fever, numbness or weakness. He has tried bed rest for the symptoms. The treatment provided mild relief.     Elevated PSA has been noted.  No significant prostate symptoms though.  He had seen Urology in past and there was perhaps a plan to do a prostate biopsy which was somehow not accomplished.  Past Medical History:   Diagnosis Date    Acute bronchitis 6/1/2017    Allergy     atorvastatin, Influenza vaccine    Diabetes mellitus     Diabetes mellitus, type 2     Eosinophilic esophagitis 2/18/2020    Based upon endoscopy done by Dr. Clark Sprague.    GERD (gastroesophageal reflux disease)     History of endoscopy 4/6/2021    Dr. Clark Sprague.  Esophagus appears to have mucosal like eosinophilic esophagitis.  Pathology did not indicate any evidence of malignancy.    Hx of rhinoplasty 10/17/2018    Hyperlipidemia     Hypertension      Social History     Socioeconomic History    Marital status: Single    Number of children: 0   Occupational History    Occupation: [employer]     Employer: [employer]   Tobacco Use    Smoking status: Former Smoker     Types: Cigarettes    Smokeless tobacco: Never Used   Substance and Sexual Activity    Alcohol use: No    Drug use: No    Sexual activity: Not Currently     Social Determinants of Health     Financial Resource Strain: Low Risk     Difficulty of Paying Living Expenses: Not very hard   Food Insecurity: No Food Insecurity    Worried About Running Out of Food in the Last Year: Never true    Ran Out of Food in the Last Year: Never true   Transportation Needs: No Transportation Needs    Lack of Transportation (Medical): No    Lack of Transportation (Non-Medical): No    Physical Activity: Inactive    Days of Exercise per Week: 0 days    Minutes of Exercise per Session: 0 min   Stress: Stress Concern Present    Feeling of Stress : To some extent   Social Connections: Moderately Isolated    Frequency of Communication with Friends and Family: Three times a week    Frequency of Social Gatherings with Friends and Family: Twice a week    Attends Taoism Services: 1 to 4 times per year    Active Member of Clubs or Organizations: No    Attends Club or Organization Meetings: Never    Marital Status: Never    Housing Stability: Low Risk     Unable to Pay for Housing in the Last Year: No    Number of Places Lived in the Last Year: 1    Unstable Housing in the Last Year: No     History reviewed. No pertinent surgical history.  Family History   Problem Relation Age of Onset    Arthritis Mother     Alzheimer's disease Mother     Cancer Father        Review of Systems   Constitutional: Negative for activity change, appetite change, fatigue, fever and unexpected weight change (lost 4 lbs).   HENT: Positive for hearing loss (both sides). Negative for congestion, sneezing, tinnitus and trouble swallowing.    Eyes: Negative for photophobia, pain, discharge and visual disturbance.   Respiratory: Negative for apnea, cough, chest tightness, shortness of breath and stridor.    Cardiovascular: Negative for chest pain, palpitations and leg swelling.        Hypertension stable.Lipids stable   Gastrointestinal: Negative for abdominal distention, abdominal pain, blood in stool, constipation, diarrhea, nausea and vomiting.        Gastroesophageal reflux with LA grade 1 esophagitis.  Currently not on any medications.  Watching his diet.   Endocrine: Negative for cold intolerance, heat intolerance, polydipsia, polyphagia and polyuria.        Diabetes mellitus type2.. Hyperlipidemia.   Genitourinary: Negative for dysuria, hematuria and scrotal swelling.        Elevated PSA level in  "recent past.  Had a Urology evaluation and there was a plan to set him for prostate biopsy which was ultimately probably not done because of lack of follow-up on patient's behalf.   Musculoskeletal: Negative for arthralgias, back pain and gait problem.   Skin: Negative for pallor, rash and wound.   Allergic/Immunologic: Negative for environmental allergies, food allergies and immunocompromised state.   Neurological: Positive for headaches (Occasional headaches). Negative for dizziness, seizures, speech difficulty, weakness, light-headedness and numbness.        Dizziness and headaches have dissipated   Hematological: Negative for adenopathy. Does not bruise/bleed easily.   Psychiatric/Behavioral: Negative for agitation, behavioral problems, confusion and suicidal ideas. The patient is nervous/anxious.         History of anxiety stable on Paxil.         Objective:      Blood pressure 136/82, pulse 74, resp. rate 18, height 5' 7" (1.702 m), weight 81.6 kg (180 lb), SpO2 98 %. Body mass index is 28.19 kg/m².  Physical Exam  Vitals and nursing note reviewed.   Constitutional:       General: He is not in acute distress.     Appearance: He is well-developed. He is obese. He is not ill-appearing, toxic-appearing or diaphoretic.      Comments: BMI 28.19   HENT:      Head: Normocephalic and atraumatic.   Eyes:      General: No scleral icterus.  Neck:      Thyroid: No thyromegaly.      Vascular: No carotid bruit or JVD.      Trachea: No tracheal deviation.   Cardiovascular:      Rate and Rhythm: Normal rate and regular rhythm.      Heart sounds: Normal heart sounds. No murmur heard.    No friction rub. No gallop.   Pulmonary:      Effort: Pulmonary effort is normal.      Breath sounds: Normal breath sounds.   Abdominal:      General: Bowel sounds are normal.      Palpations: Abdomen is soft. There is no mass.      Hernia: No hernia is present.   Musculoskeletal:      Cervical back: Normal range of motion and neck supple. No " muscular tenderness.      Lumbar back: No swelling, edema, spasms, tenderness or bony tenderness. Normal range of motion. No scoliosis.        Back:       Right lower leg: No edema.      Left lower leg: No edema.      Comments: Examination of the joints do not show any evidence of arthritis or swelling.    Area of pain as shown in the shaded area as pointed by patient.   Lymphadenopathy:      Cervical: No cervical adenopathy.   Skin:     General: Skin is warm and dry.      Findings: No lesion or rash.   Neurological:      Mental Status: He is alert. Mental status is at baseline.      Sensory: No sensory deficit.      Motor: No weakness, tremor or atrophy.      Gait: Gait and tandem walk normal.      Deep Tendon Reflexes: Reflexes normal.   Psychiatric:         Attention and Perception: Attention and perception normal.         Behavior: Behavior normal.      Comments: Note to be somewhat anhedonic and dysthymic in past though he consistently maintains an ebullient mood in person           Assessment:       1. Essential hypertension    2. Type 2 diabetes mellitus without complication, without long-term current use of insulin    3. Hypercholesteremia    4. Gastroesophageal reflux disease without esophagitis    5. Anxiety, generalized    6. Positive MEDARDO (antinuclear antibody)    7. Screening for prostate cancer    8. Chronic midline low back pain without sciatica           Orders Only on 04/27/2022   Component Date Value Ref Range Status    Hemoglobin A1C 03/03/2022 7.2 (A) % Final         Plan:           Essential hypertension  -     Comprehensive Metabolic Panel; Future; Expected date: 10/03/2022    Type 2 diabetes mellitus without complication, without long-term current use of insulin  -     Hemoglobin A1C; Future; Expected date: 10/03/2022  -     Microalbumin/Creatinine Ratio, Urine; Future; Expected date: 10/03/2022    Hypercholesteremia  -     Lipid Panel; Future; Expected date: 10/03/2022    Gastroesophageal  reflux disease without esophagitis    Anxiety, generalized    Positive MEDARDO (antinuclear antibody)    Screening for prostate cancer  -     PSA, Screening; Future; Expected date: 10/03/2022    Chronic midline low back pain without sciatica  -     Ambulatory referral/consult to Physical/Occupational Therapy; Future; Expected date: 10/03/2022    Patient's medical issues have been reviewed.    Diabetes control is:-Fair A1c 7.2    Stress and anxiety- stable . 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    Discussed about preventive care issues.    Discussed about shingles vaccine/pneumonia vaccine/ophthalmology examination.    He had smoked cigarettes in past and discussed about AAA screening.    Labs to be done in couple of months time.  This includes PSA and urine microalbumin/lipids/A1c and chemistry.  Advised Pt about age and season appropriate immunizations/ cancer screenings.  Also seasonal influenza vaccine, update on tetanus diphtheria vaccination every 10 years.  Patient has been advised to watch diet and exercise. Avoid fried and fatty food. Compliance to medications and follow up urged.  Advised Pt. to monitor Blood sugars at home and record them.  Advised Pt  for Anti reflux measures like small feequent meals, avoid spicy and greasy food. Head end up at night.  keep a close eye on feet and keep them clean. Annual eye examination. Annual influenza vaccine.  Monitor HgbA1c every 3 to 6 months. Monitor urine microalbumin every year.keep LDL less than 100. Monitor blood pressure and target blood pressure 120/70.  Please utilize precautions for current COVID-19 pandemic.  Try to avoid crowds or close contact with multiple people.  Minimize outside interaction.  Wash hands with soap for  frequently upon contact.Use face mask or cover.    Fup-3-4 months-for review of diabetes and medical conditions.    Spent cheli 30 minutes with patient which involved review of pts medical conditions, labs,  medications and with 50% of time face-to-face discussion about medical problems, management and any applicable changes.      Current Outpatient Medications:     aspirin (ECOTRIN) 81 MG EC tablet, Take 1 tablet (81 mg total) by mouth once daily., Disp: 100 tablet, Rfl: 4    cetirizine (ZYRTEC) 10 MG tablet, TAKE 1 TABLET BY MOUTH ONCE DAILY AS NEEDED FOR ALLERGIES, Disp: , Rfl:     fenofibrate 160 MG Tab, Take 1 tablet (160 mg total) by mouth every evening., Disp: 90 tablet, Rfl: 2    losartan (COZAAR) 50 MG tablet, Take 1 tablet by mouth once daily, Disp: 30 tablet, Rfl: 8    metFORMIN (GLUCOPHAGE) 1000 MG tablet, Take 1 tablet (1,000 mg total) by mouth 2 (two) times daily with meals., Disp: 180 tablet, Rfl: 2    paroxetine (PAXIL-CR) 25 MG 24 hr tablet, Take 1 tablet by mouth once daily, Disp: 90 tablet, Rfl: 0    rosuvastatin (CRESTOR) 20 MG tablet, Take 20 mg by mouth once daily., Disp: , Rfl:

## 2022-07-19 ENCOUNTER — OFFICE VISIT (OUTPATIENT)
Dept: FAMILY MEDICINE | Facility: CLINIC | Age: 67
End: 2022-07-19
Payer: MEDICARE

## 2022-07-19 VITALS
OXYGEN SATURATION: 98 % | SYSTOLIC BLOOD PRESSURE: 136 MMHG | BODY MASS INDEX: 28.25 KG/M2 | RESPIRATION RATE: 18 BRPM | HEART RATE: 74 BPM | WEIGHT: 180 LBS | HEIGHT: 67 IN | DIASTOLIC BLOOD PRESSURE: 82 MMHG

## 2022-07-19 DIAGNOSIS — M54.50 CHRONIC MIDLINE LOW BACK PAIN WITHOUT SCIATICA: ICD-10-CM

## 2022-07-19 DIAGNOSIS — Z12.5 SCREENING FOR PROSTATE CANCER: ICD-10-CM

## 2022-07-19 DIAGNOSIS — E78.00 HYPERCHOLESTEREMIA: ICD-10-CM

## 2022-07-19 DIAGNOSIS — E11.9 TYPE 2 DIABETES MELLITUS WITHOUT COMPLICATION, WITHOUT LONG-TERM CURRENT USE OF INSULIN: ICD-10-CM

## 2022-07-19 DIAGNOSIS — F41.1 ANXIETY, GENERALIZED: ICD-10-CM

## 2022-07-19 DIAGNOSIS — R76.8 POSITIVE ANA (ANTINUCLEAR ANTIBODY): ICD-10-CM

## 2022-07-19 DIAGNOSIS — G89.29 CHRONIC MIDLINE LOW BACK PAIN WITHOUT SCIATICA: ICD-10-CM

## 2022-07-19 DIAGNOSIS — K21.9 GASTROESOPHAGEAL REFLUX DISEASE WITHOUT ESOPHAGITIS: ICD-10-CM

## 2022-07-19 DIAGNOSIS — I10 ESSENTIAL HYPERTENSION: Primary | ICD-10-CM

## 2022-07-19 PROCEDURE — 3066F NEPHROPATHY DOC TX: CPT | Mod: CPTII,S$GLB,, | Performed by: INTERNAL MEDICINE

## 2022-07-19 PROCEDURE — 3008F PR BODY MASS INDEX (BMI) DOCUMENTED: ICD-10-PCS | Mod: CPTII,S$GLB,, | Performed by: INTERNAL MEDICINE

## 2022-07-19 PROCEDURE — 1101F PT FALLS ASSESS-DOCD LE1/YR: CPT | Mod: CPTII,S$GLB,, | Performed by: INTERNAL MEDICINE

## 2022-07-19 PROCEDURE — 3288F PR FALLS RISK ASSESSMENT DOCUMENTED: ICD-10-PCS | Mod: CPTII,S$GLB,, | Performed by: INTERNAL MEDICINE

## 2022-07-19 PROCEDURE — 3075F SYST BP GE 130 - 139MM HG: CPT | Mod: CPTII,S$GLB,, | Performed by: INTERNAL MEDICINE

## 2022-07-19 PROCEDURE — 1126F PR PAIN SEVERITY QUANTIFIED, NO PAIN PRESENT: ICD-10-PCS | Mod: CPTII,S$GLB,, | Performed by: INTERNAL MEDICINE

## 2022-07-19 PROCEDURE — 99214 OFFICE O/P EST MOD 30 MIN: CPT | Mod: S$GLB,,, | Performed by: INTERNAL MEDICINE

## 2022-07-19 PROCEDURE — 1160F RVW MEDS BY RX/DR IN RCRD: CPT | Mod: CPTII,S$GLB,, | Performed by: INTERNAL MEDICINE

## 2022-07-19 PROCEDURE — 3079F PR MOST RECENT DIASTOLIC BLOOD PRESSURE 80-89 MM HG: ICD-10-PCS | Mod: CPTII,S$GLB,, | Performed by: INTERNAL MEDICINE

## 2022-07-19 PROCEDURE — 3051F PR MOST RECENT HEMOGLOBIN A1C LEVEL 7.0 - < 8.0%: ICD-10-PCS | Mod: CPTII,S$GLB,, | Performed by: INTERNAL MEDICINE

## 2022-07-19 PROCEDURE — 3051F HG A1C>EQUAL 7.0%<8.0%: CPT | Mod: CPTII,S$GLB,, | Performed by: INTERNAL MEDICINE

## 2022-07-19 PROCEDURE — 3288F FALL RISK ASSESSMENT DOCD: CPT | Mod: CPTII,S$GLB,, | Performed by: INTERNAL MEDICINE

## 2022-07-19 PROCEDURE — 1159F PR MEDICATION LIST DOCUMENTED IN MEDICAL RECORD: ICD-10-PCS | Mod: CPTII,S$GLB,, | Performed by: INTERNAL MEDICINE

## 2022-07-19 PROCEDURE — 3075F PR MOST RECENT SYSTOLIC BLOOD PRESS GE 130-139MM HG: ICD-10-PCS | Mod: CPTII,S$GLB,, | Performed by: INTERNAL MEDICINE

## 2022-07-19 PROCEDURE — 1101F PR PT FALLS ASSESS DOC 0-1 FALLS W/OUT INJ PAST YR: ICD-10-PCS | Mod: CPTII,S$GLB,, | Performed by: INTERNAL MEDICINE

## 2022-07-19 PROCEDURE — 3008F BODY MASS INDEX DOCD: CPT | Mod: CPTII,S$GLB,, | Performed by: INTERNAL MEDICINE

## 2022-07-19 PROCEDURE — 1159F MED LIST DOCD IN RCRD: CPT | Mod: CPTII,S$GLB,, | Performed by: INTERNAL MEDICINE

## 2022-07-19 PROCEDURE — 3061F NEG MICROALBUMINURIA REV: CPT | Mod: CPTII,S$GLB,, | Performed by: INTERNAL MEDICINE

## 2022-07-19 PROCEDURE — 1126F AMNT PAIN NOTED NONE PRSNT: CPT | Mod: CPTII,S$GLB,, | Performed by: INTERNAL MEDICINE

## 2022-07-19 PROCEDURE — 3066F PR DOCUMENTATION OF TREATMENT FOR NEPHROPATHY: ICD-10-PCS | Mod: CPTII,S$GLB,, | Performed by: INTERNAL MEDICINE

## 2022-07-19 PROCEDURE — 3061F PR NEG MICROALBUMINURIA RESULT DOCUMENTED/REVIEW: ICD-10-PCS | Mod: CPTII,S$GLB,, | Performed by: INTERNAL MEDICINE

## 2022-07-19 PROCEDURE — 4010F ACE/ARB THERAPY RXD/TAKEN: CPT | Mod: CPTII,S$GLB,, | Performed by: INTERNAL MEDICINE

## 2022-07-19 PROCEDURE — 3079F DIAST BP 80-89 MM HG: CPT | Mod: CPTII,S$GLB,, | Performed by: INTERNAL MEDICINE

## 2022-07-19 PROCEDURE — 1160F PR REVIEW ALL MEDS BY PRESCRIBER/CLIN PHARMACIST DOCUMENTED: ICD-10-PCS | Mod: CPTII,S$GLB,, | Performed by: INTERNAL MEDICINE

## 2022-07-19 PROCEDURE — 4010F PR ACE/ARB THEARPY RXD/TAKEN: ICD-10-PCS | Mod: CPTII,S$GLB,, | Performed by: INTERNAL MEDICINE

## 2022-07-19 PROCEDURE — 99214 PR OFFICE/OUTPT VISIT, EST, LEVL IV, 30-39 MIN: ICD-10-PCS | Mod: S$GLB,,, | Performed by: INTERNAL MEDICINE

## 2022-11-03 ENCOUNTER — OFFICE VISIT (OUTPATIENT)
Dept: PODIATRY | Facility: CLINIC | Age: 67
End: 2022-11-03
Payer: MEDICARE

## 2022-11-03 VITALS
BODY MASS INDEX: 28.41 KG/M2 | HEIGHT: 67 IN | HEART RATE: 77 BPM | OXYGEN SATURATION: 94 % | RESPIRATION RATE: 16 BRPM | WEIGHT: 181 LBS

## 2022-11-03 DIAGNOSIS — M20.41 HAMMER TOE OF RIGHT FOOT: ICD-10-CM

## 2022-11-03 DIAGNOSIS — M21.619 BUNION: ICD-10-CM

## 2022-11-03 DIAGNOSIS — E11.9 TYPE 2 DIABETES MELLITUS WITHOUT COMPLICATION, WITHOUT LONG-TERM CURRENT USE OF INSULIN: Primary | ICD-10-CM

## 2022-11-03 PROCEDURE — 3008F BODY MASS INDEX DOCD: CPT | Mod: CPTII,S$GLB,, | Performed by: PODIATRIST

## 2022-11-03 PROCEDURE — 1160F PR REVIEW ALL MEDS BY PRESCRIBER/CLIN PHARMACIST DOCUMENTED: ICD-10-PCS | Mod: CPTII,S$GLB,, | Performed by: PODIATRIST

## 2022-11-03 PROCEDURE — 3066F PR DOCUMENTATION OF TREATMENT FOR NEPHROPATHY: ICD-10-PCS | Mod: CPTII,S$GLB,, | Performed by: PODIATRIST

## 2022-11-03 PROCEDURE — 1160F RVW MEDS BY RX/DR IN RCRD: CPT | Mod: CPTII,S$GLB,, | Performed by: PODIATRIST

## 2022-11-03 PROCEDURE — 1101F PT FALLS ASSESS-DOCD LE1/YR: CPT | Mod: CPTII,S$GLB,, | Performed by: PODIATRIST

## 2022-11-03 PROCEDURE — 3288F FALL RISK ASSESSMENT DOCD: CPT | Mod: CPTII,S$GLB,, | Performed by: PODIATRIST

## 2022-11-03 PROCEDURE — 99213 PR OFFICE/OUTPT VISIT, EST, LEVL III, 20-29 MIN: ICD-10-PCS | Mod: S$GLB,,, | Performed by: PODIATRIST

## 2022-11-03 PROCEDURE — 1125F PR PAIN SEVERITY QUANTIFIED, PAIN PRESENT: ICD-10-PCS | Mod: CPTII,S$GLB,, | Performed by: PODIATRIST

## 2022-11-03 PROCEDURE — 3061F PR NEG MICROALBUMINURIA RESULT DOCUMENTED/REVIEW: ICD-10-PCS | Mod: CPTII,S$GLB,, | Performed by: PODIATRIST

## 2022-11-03 PROCEDURE — 1101F PR PT FALLS ASSESS DOC 0-1 FALLS W/OUT INJ PAST YR: ICD-10-PCS | Mod: CPTII,S$GLB,, | Performed by: PODIATRIST

## 2022-11-03 PROCEDURE — 1159F PR MEDICATION LIST DOCUMENTED IN MEDICAL RECORD: ICD-10-PCS | Mod: CPTII,S$GLB,, | Performed by: PODIATRIST

## 2022-11-03 PROCEDURE — 3008F PR BODY MASS INDEX (BMI) DOCUMENTED: ICD-10-PCS | Mod: CPTII,S$GLB,, | Performed by: PODIATRIST

## 2022-11-03 PROCEDURE — 3061F NEG MICROALBUMINURIA REV: CPT | Mod: CPTII,S$GLB,, | Performed by: PODIATRIST

## 2022-11-03 PROCEDURE — 3051F PR MOST RECENT HEMOGLOBIN A1C LEVEL 7.0 - < 8.0%: ICD-10-PCS | Mod: CPTII,S$GLB,, | Performed by: PODIATRIST

## 2022-11-03 PROCEDURE — 1125F AMNT PAIN NOTED PAIN PRSNT: CPT | Mod: CPTII,S$GLB,, | Performed by: PODIATRIST

## 2022-11-03 PROCEDURE — 1159F MED LIST DOCD IN RCRD: CPT | Mod: CPTII,S$GLB,, | Performed by: PODIATRIST

## 2022-11-03 PROCEDURE — 3066F NEPHROPATHY DOC TX: CPT | Mod: CPTII,S$GLB,, | Performed by: PODIATRIST

## 2022-11-03 PROCEDURE — 3288F PR FALLS RISK ASSESSMENT DOCUMENTED: ICD-10-PCS | Mod: CPTII,S$GLB,, | Performed by: PODIATRIST

## 2022-11-03 PROCEDURE — 99213 OFFICE O/P EST LOW 20 MIN: CPT | Mod: S$GLB,,, | Performed by: PODIATRIST

## 2022-11-03 PROCEDURE — 3051F HG A1C>EQUAL 7.0%<8.0%: CPT | Mod: CPTII,S$GLB,, | Performed by: PODIATRIST

## 2022-11-03 PROCEDURE — 4010F ACE/ARB THERAPY RXD/TAKEN: CPT | Mod: CPTII,S$GLB,, | Performed by: PODIATRIST

## 2022-11-03 PROCEDURE — 4010F PR ACE/ARB THEARPY RXD/TAKEN: ICD-10-PCS | Mod: CPTII,S$GLB,, | Performed by: PODIATRIST

## 2022-11-03 NOTE — PROGRESS NOTES
"  [address] [facility] [address] [place] [address]  Phone: [phone]   [phone]    Patient's PCP:Gregorio Grijalva MD  Referring Provider: No ref. provider found    Subjective:      Chief Complaint:: Foot Pain (Bilateral great toe skin lesion)    Foot Pain  Associated symptoms include arthralgias.   Yan Hernandez Jr. is a 67 y.o. male who presents today with a complaint of bilateral skin lesion on both great toes lasting for about a month. Onset of symptoms exercising and reports no trauma.  Current symptoms include aching pain.  Aggravating factors are walking and baring weight. Symptoms have worsened. Treatment to date have included staying off feet.    Systemic Doctor: Dr. Grijalva  Date Last Seen: 07/19/2022  Blood Sugar: 133  Hemoglobin A1c: 7.2 (3/3/2022)    Vitals:    11/03/22 1607   Pulse: 77   Resp: 16   SpO2: (!) 94%   Weight: 82.1 kg (181 lb)   Height: 5' 7" (1.702 m)   PainSc: 10-Worst pain ever      Shoe Size: 10    History reviewed. No pertinent surgical history.  Past Medical History:   Diagnosis Date    Acute bronchitis 6/1/2017    Allergy     atorvastatin, Influenza vaccine    Diabetes mellitus     Diabetes mellitus, type 2     Eosinophilic esophagitis 2/18/2020    Based upon endoscopy done by Dr. Clark Sprague.    GERD (gastroesophageal reflux disease)     History of endoscopy 4/6/2021    Dr. Clark Sprague.  Esophagus appears to have mucosal like eosinophilic esophagitis.  Pathology did not indicate any evidence of malignancy.    Hx of rhinoplasty 10/17/2018    Hyperlipidemia     Hypertension      Family History   Problem Relation Age of Onset    Arthritis Mother     Alzheimer's disease Mother     Cancer Father         Social History:   Marital Status: Single  Alcohol History:  reports no history of alcohol use.  Tobacco History:  reports that he has quit smoking. His smoking use included cigarettes. He has never used smokeless tobacco.  Drug History:  reports no history of drug " use.    Review of patient's allergies indicates:   Allergen Reactions    Lisinopril Other (See Comments)     Causes dizziness.    Influenza virus vaccines      Gets sick with shots       Current Outpatient Medications   Medication Sig Dispense Refill    aspirin (ECOTRIN) 81 MG EC tablet Take 1 tablet (81 mg total) by mouth once daily. 100 tablet 4    cetirizine (ZYRTEC) 10 MG tablet TAKE 1 TABLET BY MOUTH ONCE DAILY AS NEEDED FOR ALLERGIES      fenofibrate 160 MG Tab Take 1 tablet (160 mg total) by mouth every evening. 90 tablet 2    losartan (COZAAR) 50 MG tablet Take 1 tablet by mouth once daily 30 tablet 8    metFORMIN (GLUCOPHAGE) 1000 MG tablet Take 1 tablet (1,000 mg total) by mouth 2 (two) times daily with meals. 180 tablet 2    paroxetine (PAXIL-CR) 25 MG 24 hr tablet Take 1 tablet by mouth once daily 90 tablet 2    rosuvastatin (CRESTOR) 20 MG tablet Take 20 mg by mouth once daily.       No current facility-administered medications for this visit.       Review of Systems   Musculoskeletal:  Positive for arthralgias.       Objective:        Physical Exam:   Foot Exam    General  General Appearance: appears stated age and healthy   Orientation: alert and oriented to person, place, and time   Affect: appropriate   Gait: antalgic       Right Foot/Ankle     Inspection and Palpation  Ecchymosis: none  Tenderness: great toe interphalangeal joint and great toe metatarsophalangeal joint   Swelling: great toe metatarsophalangeal joint   Arch: pes planus  Hammertoes: second toe, third toe and fourth toe  Hallux valgus: yes  Skin Exam: callus;   Neurovascular  Dorsalis pedis: 2+  Posterior tibial: 2+  Capillary Refill: 2+  Saphenous nerve sensation: normal  Tibial nerve sensation: normal  Superficial peroneal nerve sensation: normal  Deep peroneal nerve sensation: normal  Sural nerve sensation: normal      Left Foot/Ankle      Inspection and Palpation  Ecchymosis: none  Tenderness: great toe interphalangeal joint and  great toe metatarsophalangeal joint   Swelling: great toe metatarsophalangeal joint   Arch: pes planus  Hammertoes: second toe, third toe and fourth toe  Hallux valgus: yes  Skin Exam: callus;   Neurovascular  Dorsalis pedis: 2+  Posterior tibial: 2+  Capillary refill: 2+  Saphenous nerve sensation: normal  Tibial nerve sensation: normal  Superficial peroneal nerve sensation: normal  Deep peroneal nerve sensation: normal  Sural nerve sensation: normal      Physical Exam  Cardiovascular:      Pulses:           Dorsalis pedis pulses are 2+ on the right side and 2+ on the left side.        Posterior tibial pulses are 2+ on the right side and 2+ on the left side.   Musculoskeletal:      Right foot: Bunion present.      Left foot: Bunion present.   Feet:      Right foot:      Skin integrity: Callus present.      Left foot:      Skin integrity: Callus present.             Right Ankle/Foot Exam     Swelling   The patient is swollen on the great toe metatarsophalangeal joint.    Tenderness   The patient is tender to palpation of the great toe interphalangeal joint and great toe metatarsophalangeal joint.    Left Ankle/Foot Exam     Swelling   The patient is swollen on the great toe metatarsophalangeal joint.    Tenderness   The patient is tender to palpation of the great toe interphalangeal joint and great toe metatarsophalangeal joint.      Vascular Exam     Right Pulses  Dorsalis Pedis:      2+  Posterior Tibial:      2+        Left Pulses  Dorsalis Pedis:      2+  Posterior Tibial:      2+         Imaging:            Assessment:       1. Type 2 diabetes mellitus without complication, without long-term current use of insulin    2. Hammer toe of right foot    3. Bunion      Plan:   Type 2 diabetes mellitus without complication, without long-term current use of insulin    Hammer toe of right foot    Bunion    I evaluated the patient and recommend shoe modification, skin softner, padding.  I explained this will not be  curative and I am not recommending surgery unless deformities become worst.  Return as needed or in one year for DM exam    Procedures          Counseling:     I provided patient education verbally regarding:   Patient diagnosis, treatment options, as well as alternatives, risks, and benefits.     This note was created using Dragon voice recognition software that occasionally misinterpreted phrases or words.

## 2023-01-09 ENCOUNTER — OFFICE VISIT (OUTPATIENT)
Dept: PODIATRY | Facility: CLINIC | Age: 68
End: 2023-01-09
Payer: MEDICARE

## 2023-01-09 VITALS — WEIGHT: 181 LBS | HEIGHT: 67 IN | BODY MASS INDEX: 28.41 KG/M2

## 2023-01-09 DIAGNOSIS — E11.9 ENCOUNTER FOR DIABETIC FOOT EXAM: Primary | ICD-10-CM

## 2023-01-09 DIAGNOSIS — E11.9 TYPE 2 DIABETES MELLITUS WITHOUT COMPLICATION, WITHOUT LONG-TERM CURRENT USE OF INSULIN: ICD-10-CM

## 2023-01-09 DIAGNOSIS — M21.619 BUNION: ICD-10-CM

## 2023-01-09 PROCEDURE — 3288F FALL RISK ASSESSMENT DOCD: CPT | Mod: CPTII,S$GLB,, | Performed by: PODIATRIST

## 2023-01-09 PROCEDURE — 99213 PR OFFICE/OUTPT VISIT, EST, LEVL III, 20-29 MIN: ICD-10-PCS | Mod: S$GLB,,, | Performed by: PODIATRIST

## 2023-01-09 PROCEDURE — 1159F PR MEDICATION LIST DOCUMENTED IN MEDICAL RECORD: ICD-10-PCS | Mod: CPTII,S$GLB,, | Performed by: PODIATRIST

## 2023-01-09 PROCEDURE — 3008F BODY MASS INDEX DOCD: CPT | Mod: CPTII,S$GLB,, | Performed by: PODIATRIST

## 2023-01-09 PROCEDURE — 1101F PR PT FALLS ASSESS DOC 0-1 FALLS W/OUT INJ PAST YR: ICD-10-PCS | Mod: CPTII,S$GLB,, | Performed by: PODIATRIST

## 2023-01-09 PROCEDURE — 1159F MED LIST DOCD IN RCRD: CPT | Mod: CPTII,S$GLB,, | Performed by: PODIATRIST

## 2023-01-09 PROCEDURE — 1126F AMNT PAIN NOTED NONE PRSNT: CPT | Mod: CPTII,S$GLB,, | Performed by: PODIATRIST

## 2023-01-09 PROCEDURE — 99213 OFFICE O/P EST LOW 20 MIN: CPT | Mod: S$GLB,,, | Performed by: PODIATRIST

## 2023-01-09 PROCEDURE — 1126F PR PAIN SEVERITY QUANTIFIED, NO PAIN PRESENT: ICD-10-PCS | Mod: CPTII,S$GLB,, | Performed by: PODIATRIST

## 2023-01-09 PROCEDURE — 1160F PR REVIEW ALL MEDS BY PRESCRIBER/CLIN PHARMACIST DOCUMENTED: ICD-10-PCS | Mod: CPTII,S$GLB,, | Performed by: PODIATRIST

## 2023-01-09 PROCEDURE — 1101F PT FALLS ASSESS-DOCD LE1/YR: CPT | Mod: CPTII,S$GLB,, | Performed by: PODIATRIST

## 2023-01-09 PROCEDURE — 3288F PR FALLS RISK ASSESSMENT DOCUMENTED: ICD-10-PCS | Mod: CPTII,S$GLB,, | Performed by: PODIATRIST

## 2023-01-09 PROCEDURE — 1160F RVW MEDS BY RX/DR IN RCRD: CPT | Mod: CPTII,S$GLB,, | Performed by: PODIATRIST

## 2023-01-09 PROCEDURE — 3008F PR BODY MASS INDEX (BMI) DOCUMENTED: ICD-10-PCS | Mod: CPTII,S$GLB,, | Performed by: PODIATRIST

## 2023-01-09 NOTE — PATIENT INSTRUCTIONS
Your Diabetes Foot Care Program    Every day you depend on your feet to keep you moving. But when you have diabetes, your feet need special care. Even a small foot problem can become very serious. So dont take your feet for granted. By working with your diabetes healthcare team, you can learn how to protect your feet and keep them healthy.  Evaluating your feet  An evaluation helps your healthcare provider check the condition of your feet. The evaluation includes a review of your diabetes history and overall health. It may also include a foot exam, X-rays, or other tests. These can help show problems beneath the skin that you cant see or feel.  Medical history  You will be asked about your overall health and any history of foot problems. Youll also discuss your diabetes history, such as whether your blood sugar level has changed over time. It also includes questions about sensations of pain, tingling, pins and needles, or numbness. Your healthcare provider will also want to know if you have high blood pressure and heart disease, or if you smoke. Be sure to mention any medicines (including over-the-counter), supplements, or herbal remedies you take.  Foot exam  A foot exam checks the condition of different parts of your foot. First, your skin and nails are examined for any signs of infection. Blood flow is checked by feeling for the pulses in each foot. You may also have tests to study the nerves in the foot. These include using a small filament (wire) to see how sensitive your feet are. In certain cases, you will be asked to walk a short distance to check for bone, joint, and muscle problems.  Diagnostic tests  If needed, your healthcare provider will suggest certain tests to learn more about your feet. These include:  Doppler tests to measure blood flow in the feet and lower leg.  X-rays, which can show bone or joint problems.  Other imaging tests, such as an MRI (magnetic resonance imaging), bone scan, and CT  (computed tomography) scan. These can help show bone infections.  Other tests, such as vascular tests, which study the blood flow in your feet and legs. You may also have nerve studies to learn how sensitive your feet are.  Creating a foot care program  Based on the evaluation, your healthcare provider will create a foot care program for you. Your program may be as simple as starting a daily self-care routine and changing the types of shoes your wear. It may also involve treating minor foot problems, such as a corn or blister. In some cases, surgery will be needed to treat an infection or mechanical problems, such as hammer toes.  Preventing problems  When you have diabetes, its easier to prevent problems than to treat them later on. So see your healthcare team for regular checkups and foot care. Your healthcare team can also help you learn more about caring for your feet at home. For example, you may be told to avoid walking barefoot. Or you may be told that special footwear is needed to protect your feet.  Have regular checkups  Foot problems can develop quickly. So be sure to follow your healthcare teams schedule for regular checkups. During office visits, take off your shoes and socks as soon as you get in the exam room. Ask your healthcare provider to examine your feet for problems. This will make it easier to find and treat small skin irritations before they get worse. Regular checkups can also help keep track of the blood flow and feeling in your feet. If you have neuropathy (lack of feeling in your feet), you will need to have checkups more often.  Learn about self-care  The more you know about diabetes and your feet, the easier it will be to prevent problems. Members of your healthcare team can teach you how to inspect your feet and teach you to look for warning signs. They can also give you other foot care tips. During office visits, be sure to ask any questions you have.  Date Last Reviewed: 7/1/2016  ©  6532-1750 [employer]. [address] [place] [address], [place] [address]. All rights reserved. This information is not intended as a substitute for professional medical care. Always follow your healthcare professional's instructions.

## 2023-01-09 NOTE — PROGRESS NOTES
"  [address] [facility] [address]  [place] [address]  Phone: [phone]   [phone]    Patient's PCP:Gregorio Grijalva MD  Referring Provider: No ref. provider found    Subjective:      Chief Complaint:: Diabetic Foot Exam    HPI  Yan Hernandez Jr. is a 67 y.o. male who presents today for a diabetic foot exam.  Pt has seen Dr. Gregorio Grijalva MD on 07/19/2022 who treats them for their diabetes.  Pt has been a diabetic since 2007.  Taking Metformin to treat diabetes.    Blood sugar: 154  Hemoglobin A1C: 7.1      Vitals:    01/09/23 1142   Weight: 82.1 kg (181 lb)   Height: 5' 7" (1.702 m)   PainSc: 0-No pain      Shoe Size:     History reviewed. No pertinent surgical history.  Past Medical History:   Diagnosis Date    Acute bronchitis 6/1/2017    Allergy     atorvastatin, Influenza vaccine    Diabetes mellitus     Diabetes mellitus, type 2     Eosinophilic esophagitis 2/18/2020    Based upon endoscopy done by Dr. Clark Sprague.    GERD (gastroesophageal reflux disease)     History of endoscopy 4/6/2021    Dr. Clark Sprague.  Esophagus appears to have mucosal like eosinophilic esophagitis.  Pathology did not indicate any evidence of malignancy.    Hx of rhinoplasty 10/17/2018    Hyperlipidemia     Hypertension      Family History   Problem Relation Age of Onset    Arthritis Mother     Alzheimer's disease Mother     Cancer Father         Social History:   Marital Status: Single  Alcohol History:  reports no history of alcohol use.  Tobacco History:  reports that he has quit smoking. His smoking use included cigarettes. He has never used smokeless tobacco.  Drug History:  reports no history of drug use.    Review of patient's allergies indicates:   Allergen Reactions    Lisinopril Other (See Comments)     Causes dizziness.    Influenza virus vaccines      Gets sick with shots       Current Outpatient Medications   Medication Sig Dispense Refill    cetirizine (ZYRTEC) 10 MG tablet TAKE 1 TABLET BY MOUTH ONCE DAILY " AS NEEDED FOR ALLERGIES      fenofibrate 160 MG Tab Take 1 tablet (160 mg total) by mouth every evening. 90 tablet 2    losartan (COZAAR) 50 MG tablet Take 1 tablet by mouth once daily 30 tablet 8    metFORMIN (GLUCOPHAGE) 1000 MG tablet Take 1 tablet (1,000 mg total) by mouth 2 (two) times daily with meals. 180 tablet 2    paroxetine (PAXIL-CR) 25 MG 24 hr tablet Take 1 tablet by mouth once daily 90 tablet 2    rosuvastatin (CRESTOR) 20 MG tablet Take 20 mg by mouth once daily.      aspirin (ECOTRIN) 81 MG EC tablet Take 1 tablet (81 mg total) by mouth once daily. (Patient not taking: Reported on 1/9/2023) 100 tablet 4     No current facility-administered medications for this visit.       Review of Systems   Constitutional:  Negative for chills, fatigue, fever and unexpected weight change.   HENT:  Negative for hearing loss and trouble swallowing.    Eyes:  Negative for photophobia and visual disturbance.   Respiratory:  Negative for cough, shortness of breath and wheezing.    Cardiovascular:  Negative for chest pain, palpitations and leg swelling.   Gastrointestinal:  Negative for abdominal pain and nausea.   Genitourinary:  Negative for dysuria and frequency.   Musculoskeletal:  Negative for arthralgias, back pain, gait problem, joint swelling and myalgias.   Skin:  Negative for rash and wound.   Neurological:  Negative for tremors, seizures, weakness, numbness and headaches.   Hematological:  Does not bruise/bleed easily.       Objective:        Physical Exam:   Foot Exam    General  General Appearance: appears stated age and healthy   Orientation: alert and oriented to person, place, and time   Affect: appropriate   Gait: unimpaired       Right Foot/Ankle     Inspection and Palpation  Ecchymosis: none  Tenderness: none   Swelling: none   Arch: normal  Hammertoes: absent  Claw Toes: absent  Hallux valgus: yes (Mild deformity)  Hallux limitus: yes (Mild deformity)  Skin Exam: callus (Superficial keratosis plantar  IPJ 1st toe no ulceration);   Neurovascular  Dorsalis pedis: 2+  Posterior tibial: 2+  Capillary Refill: 2+  Saphenous nerve sensation: normal  Tibial nerve sensation: normal  Superficial peroneal nerve sensation: normal  Deep peroneal nerve sensation: normal  Sural nerve sensation: normal    Muscle Strength  Ankle dorsiflexion: 5  Ankle plantar flexion: 5  Ankle inversion: 5  Ankle eversion: 5  Great toe extension: 5  Great toe flexion: 5    Range of Motion    Normal right ankle ROM  Passive  1st MTP extension: 30    Active  1st MTP extension: 30      Left Foot/Ankle      Inspection and Palpation  Ecchymosis: none  Tenderness: none   Swelling: none   Arch: normal  Hammertoes: absent  Claw toes: absent  Hallux valgus: yes (Mild deformity)  Hallux limitus: yes (Mild deformity)  Skin Exam: callus (Superficial keratosis plantar IPJ 1st toe with no ulceration);   Neurovascular  Dorsalis pedis: 2+  Posterior tibial: 2+  Capillary refill: 2+  Saphenous nerve sensation: normal  Tibial nerve sensation: normal  Superficial peroneal nerve sensation: normal  Deep peroneal nerve sensation: normal  Sural nerve sensation: normal    Muscle Strength  Ankle dorsiflexion: 5  Ankle plantar flexion: 5  Ankle inversion: 5  Ankle eversion: 5  Great toe extension: 5  Great toe flexion: 5    Range of Motion    Normal left ankle ROM  Passive  1st MTP extension: 30    Active  1st MTP extension: 30      Physical Exam  Cardiovascular:      Pulses:           Dorsalis pedis pulses are 2+ on the right side and 2+ on the left side.        Posterior tibial pulses are 2+ on the right side and 2+ on the left side.   Musculoskeletal:      Right foot: Bunion (Mild deformity) present.      Left foot: Bunion (Mild deformity) present.   Feet:      Right foot:      Skin integrity: Callus (Superficial keratosis plantar IPJ 1st toe no ulceration) present.      Left foot:      Skin integrity: Callus (Superficial keratosis plantar IPJ 1st toe with no  ulceration) present.       Imaging:            Assessment:       1. Encounter for diabetic foot exam    2. Type 2 diabetes mellitus without complication, without long-term current use of insulin    3. Bunion      Plan:   Encounter for diabetic foot exam    Type 2 diabetes mellitus without complication, without long-term current use of insulin    Bunion    Evaluated patient he is consider be low risk of loss of limb because of good circulation normal neurological status with no ulcerations.  Does have mild bunion deformities recommend wide toe box shoes and running shoes and skin softeners.  He is return in 1 year for evaluation unless he has any problems.      Procedures          Counseling:     I provided patient education verbally regarding:   Patient diagnosis, treatment options, as well as alternatives, risks, and benefits.     Counseling/Education:  I provided patient education verbally regarding:   The aspects of diabetes and how it pertains to the feet. I explained the importance of proper diabetic foot care and how it is essential for the health of their feet.    I discussed the importance of knowing their Hemoglobin A1c and that the level needs to be as close to 6 as possible. I discussed the increase complications of high blood sugar including stroke, blindness, heart attack, kidney failure and loss of limb secondary to neuropathy and PVD.     With neuropathy, beware of any breaks in the skin or redness. These areas are not recognized early due to the numbness.    I discussed Diabetes, lower back issues, metabolic disorders, systemic causes, chemotherapy, vitamin deficiency, heavy metal exposure, as some of the causes. I also explained that as much as 40% of the time we can not find a cause. I discussed different treatments available to control the symptoms but which may not cure the problem.                  I provided patient education regarding: Bunion deformity and causes. I discussed conservative  treatment with shoe modification, pads, treating symptoms with OTC NSAIDs, pads between toes, inserts and pads over the bunion. I explained that conservative treatment is non-curative but may help with pain and slow down the progression of the deformity.    This note was created using Dragon voice recognition software that occasionally misinterpreted phrases or words.

## 2023-02-02 ENCOUNTER — TELEPHONE (OUTPATIENT)
Dept: FAMILY MEDICINE | Facility: CLINIC | Age: 68
End: 2023-02-02

## 2023-02-02 ENCOUNTER — LAB VISIT (OUTPATIENT)
Dept: LAB | Facility: HOSPITAL | Age: 68
End: 2023-02-02
Attending: INTERNAL MEDICINE
Payer: MEDICARE

## 2023-02-02 ENCOUNTER — OFFICE VISIT (OUTPATIENT)
Dept: FAMILY MEDICINE | Facility: CLINIC | Age: 68
End: 2023-02-02
Payer: MEDICARE

## 2023-02-02 VITALS
SYSTOLIC BLOOD PRESSURE: 129 MMHG | DIASTOLIC BLOOD PRESSURE: 75 MMHG | BODY MASS INDEX: 28.25 KG/M2 | HEART RATE: 64 BPM | WEIGHT: 180 LBS | HEIGHT: 67 IN

## 2023-02-02 DIAGNOSIS — Z12.5 SCREENING FOR PROSTATE CANCER: ICD-10-CM

## 2023-02-02 DIAGNOSIS — E11.9 TYPE 2 DIABETES MELLITUS WITHOUT COMPLICATION, WITHOUT LONG-TERM CURRENT USE OF INSULIN: ICD-10-CM

## 2023-02-02 DIAGNOSIS — I10 ESSENTIAL HYPERTENSION: ICD-10-CM

## 2023-02-02 DIAGNOSIS — E78.00 HYPERCHOLESTEREMIA: ICD-10-CM

## 2023-02-02 DIAGNOSIS — I10 ESSENTIAL HYPERTENSION: Primary | ICD-10-CM

## 2023-02-02 DIAGNOSIS — K21.9 GASTROESOPHAGEAL REFLUX DISEASE WITHOUT ESOPHAGITIS: ICD-10-CM

## 2023-02-02 DIAGNOSIS — F41.1 ANXIETY, GENERALIZED: ICD-10-CM

## 2023-02-02 LAB
ALBUMIN SERPL BCP-MCNC: 4.8 G/DL (ref 3.5–5.2)
ALBUMIN/CREAT UR: 5.1 UG/MG (ref 0–30)
ALP SERPL-CCNC: 37 U/L (ref 55–135)
ALT SERPL W/O P-5'-P-CCNC: 42 U/L (ref 10–44)
ANION GAP SERPL CALC-SCNC: 7 MMOL/L (ref 8–16)
AST SERPL-CCNC: 39 U/L (ref 10–40)
BILIRUB SERPL-MCNC: 0.8 MG/DL (ref 0.1–1)
BUN SERPL-MCNC: 11 MG/DL (ref 8–23)
CALCIUM SERPL-MCNC: 9.5 MG/DL (ref 8.7–10.5)
CHLORIDE SERPL-SCNC: 103 MMOL/L (ref 95–110)
CHOLEST SERPL-MCNC: 137 MG/DL (ref 120–199)
CHOLEST/HDLC SERPL: 3.7 {RATIO} (ref 2–5)
CO2 SERPL-SCNC: 26 MMOL/L (ref 23–29)
COMPLEXED PSA SERPL-MCNC: 0.5 NG/ML (ref 0–4)
CREAT SERPL-MCNC: 1 MG/DL (ref 0.5–1.4)
CREAT UR-MCNC: 168 MG/DL (ref 23–375)
EST. GFR  (NO RACE VARIABLE): >60 ML/MIN/1.73 M^2
GLUCOSE SERPL-MCNC: 144 MG/DL (ref 70–110)
HDLC SERPL-MCNC: 37 MG/DL (ref 40–75)
HDLC SERPL: 27 % (ref 20–50)
LDLC SERPL CALC-MCNC: 74 MG/DL (ref 63–159)
MICROALBUMIN UR DL<=1MG/L-MCNC: 8.6 UG/ML
NONHDLC SERPL-MCNC: 100 MG/DL
POTASSIUM SERPL-SCNC: 4.4 MMOL/L (ref 3.5–5.1)
PROT SERPL-MCNC: 7.9 G/DL (ref 6–8.4)
SODIUM SERPL-SCNC: 136 MMOL/L (ref 136–145)
TRIGL SERPL-MCNC: 130 MG/DL (ref 30–150)

## 2023-02-02 PROCEDURE — 99213 PR OFFICE/OUTPT VISIT, EST, LEVL III, 20-29 MIN: ICD-10-PCS | Mod: S$GLB,,, | Performed by: INTERNAL MEDICINE

## 2023-02-02 PROCEDURE — 3066F NEPHROPATHY DOC TX: CPT | Mod: CPTII,S$GLB,, | Performed by: INTERNAL MEDICINE

## 2023-02-02 PROCEDURE — 4010F ACE/ARB THERAPY RXD/TAKEN: CPT | Mod: CPTII,S$GLB,, | Performed by: INTERNAL MEDICINE

## 2023-02-02 PROCEDURE — 3066F PR DOCUMENTATION OF TREATMENT FOR NEPHROPATHY: ICD-10-PCS | Mod: CPTII,S$GLB,, | Performed by: INTERNAL MEDICINE

## 2023-02-02 PROCEDURE — 82570 ASSAY OF URINE CREATININE: CPT | Performed by: INTERNAL MEDICINE

## 2023-02-02 PROCEDURE — 99213 OFFICE O/P EST LOW 20 MIN: CPT | Mod: S$GLB,,, | Performed by: INTERNAL MEDICINE

## 2023-02-02 PROCEDURE — 3061F NEG MICROALBUMINURIA REV: CPT | Mod: CPTII,S$GLB,, | Performed by: INTERNAL MEDICINE

## 2023-02-02 PROCEDURE — 3288F FALL RISK ASSESSMENT DOCD: CPT | Mod: CPTII,S$GLB,, | Performed by: INTERNAL MEDICINE

## 2023-02-02 PROCEDURE — 1126F PR PAIN SEVERITY QUANTIFIED, NO PAIN PRESENT: ICD-10-PCS | Mod: CPTII,S$GLB,, | Performed by: INTERNAL MEDICINE

## 2023-02-02 PROCEDURE — 1159F MED LIST DOCD IN RCRD: CPT | Mod: CPTII,S$GLB,, | Performed by: INTERNAL MEDICINE

## 2023-02-02 PROCEDURE — 36415 COLL VENOUS BLD VENIPUNCTURE: CPT | Performed by: INTERNAL MEDICINE

## 2023-02-02 PROCEDURE — 1159F PR MEDICATION LIST DOCUMENTED IN MEDICAL RECORD: ICD-10-PCS | Mod: CPTII,S$GLB,, | Performed by: INTERNAL MEDICINE

## 2023-02-02 PROCEDURE — 3288F PR FALLS RISK ASSESSMENT DOCUMENTED: ICD-10-PCS | Mod: CPTII,S$GLB,, | Performed by: INTERNAL MEDICINE

## 2023-02-02 PROCEDURE — 3074F SYST BP LT 130 MM HG: CPT | Mod: CPTII,S$GLB,, | Performed by: INTERNAL MEDICINE

## 2023-02-02 PROCEDURE — 3061F PR NEG MICROALBUMINURIA RESULT DOCUMENTED/REVIEW: ICD-10-PCS | Mod: CPTII,S$GLB,, | Performed by: INTERNAL MEDICINE

## 2023-02-02 PROCEDURE — 1101F PT FALLS ASSESS-DOCD LE1/YR: CPT | Mod: CPTII,S$GLB,, | Performed by: INTERNAL MEDICINE

## 2023-02-02 PROCEDURE — 83036 HEMOGLOBIN GLYCOSYLATED A1C: CPT | Performed by: INTERNAL MEDICINE

## 2023-02-02 PROCEDURE — 3078F PR MOST RECENT DIASTOLIC BLOOD PRESSURE < 80 MM HG: ICD-10-PCS | Mod: CPTII,S$GLB,, | Performed by: INTERNAL MEDICINE

## 2023-02-02 PROCEDURE — 3008F PR BODY MASS INDEX (BMI) DOCUMENTED: ICD-10-PCS | Mod: CPTII,S$GLB,, | Performed by: INTERNAL MEDICINE

## 2023-02-02 PROCEDURE — 1101F PR PT FALLS ASSESS DOC 0-1 FALLS W/OUT INJ PAST YR: ICD-10-PCS | Mod: CPTII,S$GLB,, | Performed by: INTERNAL MEDICINE

## 2023-02-02 PROCEDURE — 4010F PR ACE/ARB THEARPY RXD/TAKEN: ICD-10-PCS | Mod: CPTII,S$GLB,, | Performed by: INTERNAL MEDICINE

## 2023-02-02 PROCEDURE — 80053 COMPREHEN METABOLIC PANEL: CPT | Performed by: INTERNAL MEDICINE

## 2023-02-02 PROCEDURE — 3078F DIAST BP <80 MM HG: CPT | Mod: CPTII,S$GLB,, | Performed by: INTERNAL MEDICINE

## 2023-02-02 PROCEDURE — 1160F PR REVIEW ALL MEDS BY PRESCRIBER/CLIN PHARMACIST DOCUMENTED: ICD-10-PCS | Mod: CPTII,S$GLB,, | Performed by: INTERNAL MEDICINE

## 2023-02-02 PROCEDURE — 1160F RVW MEDS BY RX/DR IN RCRD: CPT | Mod: CPTII,S$GLB,, | Performed by: INTERNAL MEDICINE

## 2023-02-02 PROCEDURE — 3074F PR MOST RECENT SYSTOLIC BLOOD PRESSURE < 130 MM HG: ICD-10-PCS | Mod: CPTII,S$GLB,, | Performed by: INTERNAL MEDICINE

## 2023-02-02 PROCEDURE — 80061 LIPID PANEL: CPT | Performed by: INTERNAL MEDICINE

## 2023-02-02 PROCEDURE — 84153 ASSAY OF PSA TOTAL: CPT | Performed by: INTERNAL MEDICINE

## 2023-02-02 PROCEDURE — 1126F AMNT PAIN NOTED NONE PRSNT: CPT | Mod: CPTII,S$GLB,, | Performed by: INTERNAL MEDICINE

## 2023-02-02 PROCEDURE — 3008F BODY MASS INDEX DOCD: CPT | Mod: CPTII,S$GLB,, | Performed by: INTERNAL MEDICINE

## 2023-02-02 NOTE — TELEPHONE ENCOUNTER
----- Message from Gregorio Grijalva MD sent at 2/2/2023  1:13 PM CST -----  Please notify patient about his labs.  Cholesterol numbers are excellent with total cholesterol of 137 and LDL cholesterol of 74.  Previously they were 224 and 145.     General chemistry shows a normal kidney and liver test.  Blood sugar is somewhat elevated at 144.  I am waiting for the A1c test.    PSA test for prostate cancer is normal.  Urine test for protein is also normal.    Hemoglobin A1c test is pending at this point.

## 2023-02-02 NOTE — PROGRESS NOTES
Subjective:       Patient ID: Yan Hernandez Jr. is a 67 y.o. male.    Chief Complaint: Hypertension, Diabetes, Mood Disorder, and Hyperlipidemia    Patient is a 67-year-old gentleman who comes for follow-up.  Underlying medical issues are as below:-    1. Essential hypertension   2. Type 2 diabetes mellitus without complication, without long-term current use of insulin   3. Hypercholesteremia   4. Gastroesophageal reflux disease without esophagitis   5. Anxiety, generalized   6. Positive MEDARDO (antinuclear antibody)     Life is generally okay.  He does his walking in the [place] more or less every day.  Blood pressures are doing okay.    Last visit about 3 months back his hemoglobin A1c was 7.1.  He is fairly stable with his medical conditions.  Not very strict on diet and exercise.    Last visit we had discussed about his longstanding anxiety especially when he was working and if he had reached his station and junction in his life when situation is easing and he may consider weaning off the medications slowly.  Due to prevailing political situation in the country as well as COVID-19 situations and at that point he did not feel he was ready to wean off.    Today preventive care issues also have been reviewed including shingles vaccine, pneumonia vaccine ophthalmology examination and foot examination has been done today.    Social determinants of health care also have been reviewed.  Mostly the concerns are for some degree of social isolation given his single status and also lack of exercise.  Stress also has a concern which has been chronic      Hypertension  This is a chronic problem. The current episode started more than 1 year ago. The problem is controlled. Associated symptoms include anxiety. Pertinent negatives include no palpitations or shortness of breath. Risk factors for coronary artery disease include male gender, diabetes mellitus and obesity. Past treatments include angiotensin blockers. The current  treatment provides moderate improvement. Compliance problems include psychosocial issues.  There is no history of chronic renal disease, hyperaldosteronism or renovascular disease.   Diabetes  He presents for his follow-up diabetic visit. He has type 2 diabetes mellitus. The initial diagnosis of diabetes was made 10 years ago. His disease course has been stable. Hypoglycemia symptoms include nervousness/anxiousness. Pertinent negatives for hypoglycemia include no confusion or pallor. Pertinent negatives for diabetes include no fatigue, no polydipsia, no polyphagia and no polyuria. There are no hypoglycemic complications. There are no diabetic complications. Risk factors for coronary artery disease include male sex, obesity, hypertension, dyslipidemia and diabetes mellitus. Current diabetic treatment includes oral agent (monotherapy) (Metformin 850 mg b.i.d.). He is compliant with treatment some of the time. Meal planning includes avoidance of concentrated sweets. He participates in exercise intermittently. An ACE inhibitor/angiotensin II receptor blocker is being taken. He does not see a podiatrist.  Hyperlipidemia  This is a chronic problem. The current episode started more than 1 year ago. The problem is controlled. He has no history of chronic renal disease or obesity. Pertinent negatives include no shortness of breath. Current antihyperlipidemic treatment includes statins and fibric acid derivatives. Risk factors for coronary artery disease include hypertension, male sex, dyslipidemia and diabetes mellitus.   Anxiety  Presents for follow-up visit. Symptoms include nervous/anxious behavior. Patient reports no confusion, nausea, palpitations, shortness of breath or suicidal ideas. Primary symptoms comment: Currently stable on Paxil..         Past Medical History:   Diagnosis Date    Acute bronchitis 6/1/2017    Allergy     atorvastatin, Influenza vaccine    Diabetes mellitus     Diabetes mellitus, type 2      Eosinophilic esophagitis 2/18/2020    Based upon endoscopy done by Dr. Clark Sprague.    GERD (gastroesophageal reflux disease)     History of endoscopy 4/6/2021    Dr. Clark Sprague.  Esophagus appears to have mucosal like eosinophilic esophagitis.  Pathology did not indicate any evidence of malignancy.    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     Employer: [email or web address]   Tobacco Use    Smoking status: Former     Types: Cigarettes    Smokeless tobacco: Never   Substance and Sexual Activity    Alcohol use: No    Drug use: No    Sexual activity: Not Currently     Social Determinants of Health     Financial Resource Strain: Low Risk     Difficulty of Paying Living Expenses: Not very hard   Food Insecurity: No Food Insecurity    Worried About Running Out of Food in the Last Year: Never true    Ran Out of Food in the Last Year: Never true   Transportation Needs: No Transportation Needs    Lack of Transportation (Medical): No    Lack of Transportation (Non-Medical): No   Physical Activity: Inactive    Days of Exercise per Week: 0 days    Minutes of Exercise per Session: 0 min   Stress: Stress Concern Present    Feeling of Stress : To some extent   Social Connections: Moderately Isolated    Frequency of Communication with Friends and Family: Three times a week    Frequency of Social Gatherings with Friends and Family: Twice a week    Attends Jewish Services: 1 to 4 times per year    Active Member of Clubs or Organizations: No    Attends Club or Organization Meetings: Never    Marital Status: Never    Housing Stability: Low Risk     Unable to Pay for Housing in the Last Year: No    Number of Places Lived in the Last Year: 1    Unstable Housing in the Last Year: No     History reviewed. No pertinent surgical history.  Family  "History   Problem Relation Age of Onset    Arthritis Mother     Alzheimer's disease Mother     Cancer Father        Review of Systems   Constitutional:  Negative for activity change, appetite change and fatigue.   HENT:  Negative for congestion, facial swelling and postnasal drip.    Eyes:  Negative for pain, discharge and visual disturbance.   Respiratory:  Negative for cough, chest tightness and shortness of breath.    Cardiovascular:  Negative for palpitations.        Hypertension   Gastrointestinal:  Negative for abdominal distention, anal bleeding and nausea.   Endocrine: Negative for cold intolerance, polydipsia, polyphagia and polyuria.        Type 2 diabetes, hyperlipidemia.   Skin:  Negative for pallor.   Psychiatric/Behavioral:  Negative for confusion and suicidal ideas. The patient is nervous/anxious.        Objective:      Blood pressure 129/75, pulse 64, height 5' 7" (1.702 m), weight 81.6 kg (180 lb). Body mass index is 28.19 kg/m².  Physical Exam  Vitals and nursing note reviewed.   Constitutional:       General: He is not in acute distress.     Appearance: He is well-developed. He is obese. He is not ill-appearing, toxic-appearing or diaphoretic.      Comments: BMI 28.19   HENT:      Head: Normocephalic and atraumatic.   Eyes:      General: No scleral icterus.  Neck:      Thyroid: No thyromegaly.      Vascular: No carotid bruit or JVD.      Trachea: No tracheal deviation.   Cardiovascular:      Rate and Rhythm: Normal rate and regular rhythm.      Heart sounds: Normal heart sounds. No murmur heard.    No friction rub. No gallop.   Pulmonary:      Effort: Pulmonary effort is normal.      Breath sounds: Normal breath sounds.   Abdominal:      General: There is no distension.      Palpations: Abdomen is soft. There is no mass.      Hernia: No hernia is present.   Musculoskeletal:      Cervical back: Neck supple. No muscular tenderness.      Right lower leg: No edema.      Left lower leg: No edema.      " Right foot: Normal range of motion. No deformity.      Left foot: Normal range of motion. No deformity.   Feet:      Right foot:      Protective Sensation: 5 sites tested.  5 sites sensed.      Skin integrity: No ulcer or blister.      Toenail Condition: Right toenails are normal.      Left foot:      Protective Sensation: 5 sites tested.  5 sites sensed.      Skin integrity: No ulcer or blister.      Toenail Condition: Left toenails are normal.   Lymphadenopathy:      Cervical: No cervical adenopathy.   Skin:     General: Skin is warm and dry.      Findings: No lesion or rash.   Neurological:      Mental Status: He is alert. Mental status is at baseline.      Gait: Gait and tandem walk normal.   Psychiatric:         Attention and Perception: Attention and perception normal.         Behavior: Behavior normal.      Comments: Note to be somewhat anhedonic and dysthymic in past though he consistently maintains an ebullient mood in person         Assessment:       1. Essential hypertension    2. Hypercholesteremia    3. Type 2 diabetes mellitus without complication, without long-term current use of insulin    4. Gastroesophageal reflux disease without esophagitis    5. Anxiety, generalized    6. Screening for prostate cancer           Lab Visit on 02/02/2023   Component Date Value Ref Range Status    PSA, Screen 02/02/2023 0.50  0.00 - 4.00 ng/mL Final    Sodium 02/02/2023 136  136 - 145 mmol/L Final    Potassium 02/02/2023 4.4  3.5 - 5.1 mmol/L Final    Chloride 02/02/2023 103  95 - 110 mmol/L Final    CO2 02/02/2023 26  23 - 29 mmol/L Final    Glucose 02/02/2023 144 (H)  70 - 110 mg/dL Final    BUN 02/02/2023 11  8 - 23 mg/dL Final    Creatinine 02/02/2023 1.0  0.5 - 1.4 mg/dL Final    Calcium 02/02/2023 9.5  8.7 - 10.5 mg/dL Final    Total Protein 02/02/2023 7.9  6.0 - 8.4 g/dL Final    Albumin 02/02/2023 4.8  3.5 - 5.2 g/dL Final    Total Bilirubin 02/02/2023 0.8  0.1 - 1.0 mg/dL Final    Alkaline Phosphatase  02/02/2023 37 (L)  55 - 135 U/L Final    AST 02/02/2023 39  10 - 40 U/L Final    ALT 02/02/2023 42  10 - 44 U/L Final    Anion Gap 02/02/2023 7 (L)  8 - 16 mmol/L Final    eGFR 02/02/2023 >60.0  >60 mL/min/1.73 m^2 Final    Cholesterol 02/02/2023 137  120 - 199 mg/dL Final    Triglycerides 02/02/2023 130  30 - 150 mg/dL Final    HDL 02/02/2023 37 (L)  40 - 75 mg/dL Final    LDL Cholesterol 02/02/2023 74.0  63.0 - 159.0 mg/dL Final    HDL/Cholesterol Ratio 02/02/2023 27.0  20.0 - 50.0 % Final    Total Cholesterol/HDL Ratio 02/02/2023 3.7  2.0 - 5.0 Final    Non-HDL Cholesterol 02/02/2023 100  mg/dL Final    Microalbumin, Urine 02/02/2023 8.6  <19.9 ug/mL Final    Creatinine, Urine 02/02/2023 168.0  23.0 - 375.0 mg/dL Final    Microalb/Creat Ratio 02/02/2023 5.1  0.0 - 30.0 ug/mg Final         Plan:           Essential hypertension  -     Comprehensive Metabolic Panel; Future; Expected date: 02/02/2023    Hypercholesteremia  -     Lipid Panel; Future; Expected date: 02/02/2023    Type 2 diabetes mellitus without complication, without long-term current use of insulin  -     Hemoglobin A1C; Future; Expected date: 02/02/2023  -     Microalbumin/Creatinine Ratio, Urine; Future; Expected date: 02/02/2023    Gastroesophageal reflux disease without esophagitis    Anxiety, generalized    Screening for prostate cancer  -     PSA, Screening; Future; Expected date: 02/02/2023      Labs are still pending at this point.  Otherwise albumin is doing good.      Advised Pt about age and season appropriate immunizations/ cancer screenings.  Also seasonal influenza vaccine, update on tetanus diphtheria vaccination every 10 years.  Patient has been advised to watch diet and exercise. Avoid fried and fatty food. Compliance to medications and follow up urged.  Advised Pt. to monitor Blood sugars at home and record them.  Advised Pt  for Anti reflux measures like small feequent meals, avoid spicy and greasy food. Head end up at night.  keep  a close eye on feet and keep them clean. Annual eye examination. Annual influenza vaccine.  Monitor HgbA1c every 3 to 6 months. Monitor urine microalbumin every year.keep LDL less than 100. Monitor blood pressure and target blood pressure 120/70.  Please utilize precautions for current COVID-19 pandemic.  Try to avoid crowds or close contact with multiple people.  Minimize outside interaction.  Wash hands with soap for  frequently upon contact.Use face mask or cover.    Fup 4-6 months    Spent cheli 25 minutes with patient which involved review of pts medical conditions, labs, medications and with 50% of time face-to-face discussion about medical problems, management and any applicable changes.    Fup 6 months      Current Outpatient Medications:     aspirin (ECOTRIN) 81 MG EC tablet, Take 1 tablet (81 mg total) by mouth once daily., Disp: 100 tablet, Rfl: 4    cetirizine (ZYRTEC) 10 MG tablet, TAKE 1 TABLET BY MOUTH ONCE DAILY AS NEEDED FOR ALLERGIES, Disp: , Rfl:     fenofibrate 160 MG Tab, Take 1 tablet (160 mg total) by mouth every evening., Disp: 90 tablet, Rfl: 2    losartan (COZAAR) 50 MG tablet, Take 1 tablet by mouth once daily, Disp: 30 tablet, Rfl: 8    metFORMIN (GLUCOPHAGE) 1000 MG tablet, TAKE 1 TABLET BY MOUTH TWICE DAILY WITH MEALS, Disp: 180 tablet, Rfl: 0    paroxetine (PAXIL-CR) 25 MG 24 hr tablet, Take 1 tablet by mouth once daily, Disp: 90 tablet, Rfl: 2    rosuvastatin (CRESTOR) 20 MG tablet, Take 20 mg by mouth once daily., Disp: , Rfl:

## 2023-02-02 NOTE — PROGRESS NOTES
Please notify patient about his labs.  Cholesterol numbers are excellent with total cholesterol of 137 and LDL cholesterol of 74.  Previously they were 224 and 145.     General chemistry shows a normal kidney and liver test.  Blood sugar is somewhat elevated at 144.  I am waiting for the A1c test.    PSA test for prostate cancer is normal.  Urine test for protein is also normal.    Hemoglobin A1c test is pending at this point.

## 2023-02-03 LAB
ESTIMATED AVG GLUCOSE: 157 MG/DL (ref 68–131)
HBA1C MFR BLD: 7.1 % (ref 4.5–6.2)

## 2023-02-06 ENCOUNTER — TELEPHONE (OUTPATIENT)
Dept: FAMILY MEDICINE | Facility: CLINIC | Age: 68
End: 2023-02-06

## 2023-02-06 NOTE — TELEPHONE ENCOUNTER
----- Message from Gregorio Grijalva MD sent at 2/3/2023  9:59 AM CST -----  Please notify the patient that his hemoglobin A1c 7.1 which is slightly better than 7.21 year back.  I would like to see him less than 7.0 next time.

## 2023-05-02 DIAGNOSIS — F41.1 ANXIETY, GENERALIZED: ICD-10-CM

## 2023-05-02 RX ORDER — PAROXETINE HYDROCHLORIDE HEMIHYDRATE 25 MG/1
TABLET, FILM COATED, EXTENDED RELEASE ORAL
Qty: 90 TABLET | Refills: 1 | Status: SHIPPED | OUTPATIENT
Start: 2023-05-02 | End: 2023-10-23

## 2023-06-28 ENCOUNTER — OFFICE VISIT (OUTPATIENT)
Dept: OTOLARYNGOLOGY | Facility: CLINIC | Age: 68
End: 2023-06-28
Payer: MEDICARE

## 2023-06-28 VITALS — HEIGHT: 67 IN | RESPIRATION RATE: 16 BRPM | TEMPERATURE: 98 F | BODY MASS INDEX: 28.23 KG/M2 | WEIGHT: 179.88 LBS

## 2023-06-28 DIAGNOSIS — M26.629 TMJPDS (TEMPOROMANDIBULAR JOINT PAIN DYSFUNCTION SYNDROME): ICD-10-CM

## 2023-06-28 DIAGNOSIS — L29.9 ITCHING OF EAR: ICD-10-CM

## 2023-06-28 DIAGNOSIS — H61.21 EXCESSIVE CERUMEN IN EAR CANAL, RIGHT: ICD-10-CM

## 2023-06-28 DIAGNOSIS — H92.01 ACUTE OTALGIA, RIGHT: Primary | ICD-10-CM

## 2023-06-28 PROCEDURE — 99999 PR PBB SHADOW E&M-EST. PATIENT-LVL IV: ICD-10-PCS | Mod: PBBFAC,,, | Performed by: OTOLARYNGOLOGY

## 2023-06-28 PROCEDURE — 99204 OFFICE O/P NEW MOD 45 MIN: CPT | Mod: 25,S$GLB,, | Performed by: OTOLARYNGOLOGY

## 2023-06-28 PROCEDURE — 3066F PR DOCUMENTATION OF TREATMENT FOR NEPHROPATHY: ICD-10-PCS | Mod: CPTII,S$GLB,, | Performed by: OTOLARYNGOLOGY

## 2023-06-28 PROCEDURE — 99204 PR OFFICE/OUTPT VISIT, NEW, LEVL IV, 45-59 MIN: ICD-10-PCS | Mod: 25,S$GLB,, | Performed by: OTOLARYNGOLOGY

## 2023-06-28 PROCEDURE — 3066F NEPHROPATHY DOC TX: CPT | Mod: CPTII,S$GLB,, | Performed by: OTOLARYNGOLOGY

## 2023-06-28 PROCEDURE — 3061F PR NEG MICROALBUMINURIA RESULT DOCUMENTED/REVIEW: ICD-10-PCS | Mod: CPTII,S$GLB,, | Performed by: OTOLARYNGOLOGY

## 2023-06-28 PROCEDURE — 1159F MED LIST DOCD IN RCRD: CPT | Mod: CPTII,S$GLB,, | Performed by: OTOLARYNGOLOGY

## 2023-06-28 PROCEDURE — 1160F PR REVIEW ALL MEDS BY PRESCRIBER/CLIN PHARMACIST DOCUMENTED: ICD-10-PCS | Mod: CPTII,S$GLB,, | Performed by: OTOLARYNGOLOGY

## 2023-06-28 PROCEDURE — 69210 EAR CERUMEN REMOVAL: ICD-10-PCS | Mod: S$GLB,,, | Performed by: OTOLARYNGOLOGY

## 2023-06-28 PROCEDURE — 1101F PR PT FALLS ASSESS DOC 0-1 FALLS W/OUT INJ PAST YR: ICD-10-PCS | Mod: CPTII,S$GLB,, | Performed by: OTOLARYNGOLOGY

## 2023-06-28 PROCEDURE — 1160F RVW MEDS BY RX/DR IN RCRD: CPT | Mod: CPTII,S$GLB,, | Performed by: OTOLARYNGOLOGY

## 2023-06-28 PROCEDURE — 3288F PR FALLS RISK ASSESSMENT DOCUMENTED: ICD-10-PCS | Mod: CPTII,S$GLB,, | Performed by: OTOLARYNGOLOGY

## 2023-06-28 PROCEDURE — 3051F HG A1C>EQUAL 7.0%<8.0%: CPT | Mod: CPTII,S$GLB,, | Performed by: OTOLARYNGOLOGY

## 2023-06-28 PROCEDURE — 1159F PR MEDICATION LIST DOCUMENTED IN MEDICAL RECORD: ICD-10-PCS | Mod: CPTII,S$GLB,, | Performed by: OTOLARYNGOLOGY

## 2023-06-28 PROCEDURE — 4010F PR ACE/ARB THEARPY RXD/TAKEN: ICD-10-PCS | Mod: CPTII,S$GLB,, | Performed by: OTOLARYNGOLOGY

## 2023-06-28 PROCEDURE — 1126F AMNT PAIN NOTED NONE PRSNT: CPT | Mod: CPTII,S$GLB,, | Performed by: OTOLARYNGOLOGY

## 2023-06-28 PROCEDURE — 99999 PR PBB SHADOW E&M-EST. PATIENT-LVL IV: CPT | Mod: PBBFAC,,, | Performed by: OTOLARYNGOLOGY

## 2023-06-28 PROCEDURE — 3008F PR BODY MASS INDEX (BMI) DOCUMENTED: ICD-10-PCS | Mod: CPTII,S$GLB,, | Performed by: OTOLARYNGOLOGY

## 2023-06-28 PROCEDURE — 3051F PR MOST RECENT HEMOGLOBIN A1C LEVEL 7.0 - < 8.0%: ICD-10-PCS | Mod: CPTII,S$GLB,, | Performed by: OTOLARYNGOLOGY

## 2023-06-28 PROCEDURE — 3008F BODY MASS INDEX DOCD: CPT | Mod: CPTII,S$GLB,, | Performed by: OTOLARYNGOLOGY

## 2023-06-28 PROCEDURE — 69210 REMOVE IMPACTED EAR WAX UNI: CPT | Mod: S$GLB,,, | Performed by: OTOLARYNGOLOGY

## 2023-06-28 PROCEDURE — 1126F PR PAIN SEVERITY QUANTIFIED, NO PAIN PRESENT: ICD-10-PCS | Mod: CPTII,S$GLB,, | Performed by: OTOLARYNGOLOGY

## 2023-06-28 PROCEDURE — 3061F NEG MICROALBUMINURIA REV: CPT | Mod: CPTII,S$GLB,, | Performed by: OTOLARYNGOLOGY

## 2023-06-28 PROCEDURE — 3288F FALL RISK ASSESSMENT DOCD: CPT | Mod: CPTII,S$GLB,, | Performed by: OTOLARYNGOLOGY

## 2023-06-28 PROCEDURE — 1101F PT FALLS ASSESS-DOCD LE1/YR: CPT | Mod: CPTII,S$GLB,, | Performed by: OTOLARYNGOLOGY

## 2023-06-28 PROCEDURE — 4010F ACE/ARB THERAPY RXD/TAKEN: CPT | Mod: CPTII,S$GLB,, | Performed by: OTOLARYNGOLOGY

## 2023-06-28 RX ORDER — PANTOPRAZOLE SODIUM 20 MG/1
20 TABLET, DELAYED RELEASE ORAL
COMMUNITY
Start: 2023-06-23 | End: 2023-12-14

## 2023-06-28 NOTE — PROCEDURES
"Ear Cerumen Removal    Date/Time: 6/28/2023 11:00 AM  Performed by: Clark Chiu MD  Authorized by: Clark Chiu MD     Time out: Immediately prior to procedure a "time out" was called to verify the correct patient, procedure, equipment, support staff and site/side marked as required.    Consent Done?:  Yes (Verbal)    Local anesthetic:  None  Location details:  Right ear  Procedure type: curette    Cerumen  Removal Results:  Cerumen completely removed  Patient tolerance:  Patient tolerated the procedure well with no immediate complications     See note for details.   "

## 2023-06-28 NOTE — PATIENT INSTRUCTIONS
No clear cause of the ear pain.  Temporomandibular joint dysfunction maybe responsible and information as provided.  A course of ibuprofen taken regularly 3 or 4 tablets 3 times daily for a week with meals maybe beneficial in addition to stretches heat and exercises provided.      There is some itching of the ear which will be treated with fluocinolone/derm otic oil for a week then routine ear care with mineral oil as outlined and discussed.      If these measures do not resolve the discomfort, patient is to call the office to arrange CT scanning or MRI scanning of the neck to rule out any hidden causes for pain in the ear not appreciate on examination today and follow-up afterwards to discuss results and next steps.    Voice recognition software was used in the creation of this note/communication and any sound-alike errors which may have occurred from its use should be taken in context when interpreting.  If such errors prevent a clear understanding of the note/communication, please contact the office for clarification.    DERMOTIC/FLUOCINOLONE OIL    Use prescribed fluocinolone/derm otic oil to both ears smearing around the outer ear scaling areas as well as down in the ear canal twice daily for a week no more than 2. At this point follow a routine ear care as outlined.  If not improved with this dose of steroid a higher concentration steroid may prove necessary.      ROUTINE EAR CARE    Keep the ears dry in general.  Water and soap dry the ears increases scaling by stripping away the natural oils of the ears.    A twisted single ply of facial tissue can be used to wick out moisture on the rare occasion when water gets stuck in the ear; or the water can be displaced with concentrated alcohol like OTC SwimEar or a few drops of 72-95% isopropyl alcohol to fill the ear canal.  Regular use will cause excess drying of the ears.    The ear should be kept moist in general with mineral oil. Three to four drops into the  ear canal 2 to 3 times a week or even every night.    If the ears need to be irrigated use either a 50 50 mixture of white vinegar and distilled water or 50 50 mixture of alcohol and white vinegar.    Painful draining ears that do not resolve with conservative care could represent infection and may need microscopic office debridement/clearing of the wax, and topical antibiotic drops with or without steroids may need to be prescribed.      TMJ Syndrome / Sprain    This is a condition with chronic or recurrent pain in the joint of the jaw (in front of the ear). Just like all other joints in the body (knees, hips, etc.) the jaw joint can be sprained or chronically injured over time. The jaw joint capsule can wear out just like other joints in the body.    The pain may cause limited motion of the jaw, a locking or catching sensation, clicking, popping, or grinding sounds from the joint with movement. It is a very common cause of headache, earache or neck pain. Other symptoms include ringing in the ear, and pressure/fullness of the ear.    The nerve that gives sensation to the jaw joint also gives sensation to the ear and part of the face. This is why pain in the face or ear can be coming from the jaw joint. It is sometimes caused by inflammation in the joint, injury or wear-and-tear of the cartilage in the joint, involuntary grinding of the teeth or poorly fitting dentures. Emotional stress and tension are often a factor. Most cases resolve completely within a few months with proper treatment.    Home Care:  1) Rest the jaw by avoiding crunchy or hard foods to chew. Do not eat hard or sticky candies. Soft foods and liquids are easier on the jaw. Protect your jaw while yawning.  2) Hot compress (small towel soaked in hot water or heating pad) applied to the jaw may give relief by reducing muscle spasm. Some people get relief with cold packs, so try both and see which one works best for you.  3) You may use ibuprofen  (Motrin, Advil) to control pain, unless another medicine was prescribed.   If you weight between 130 and 150 lb, you may take 600 mg of Ibuprofen every 6 hours as needed for a few weeks, then less frequently following this. If you weight > 150 lb, you may take 800 mg every 6 hours for the same time period. Extended use of Ibuprofen is typically OK, but not every 6 hours (usually one or two times per day.)   [ NOTE : If you have chronic liver or kidney disease or ever had a stomach ulcer or GI bleeding, talk with your doctor before using these medicines.]  4) If you suspect emotional stress is related to your condition.  a) Try to identify the sources of stress in your life. It may not be obvious! These may include:  -- Daily hassles of life that pile up (traffic jams, missed appointments, car troubles)  -- Major life changes, both good (new baby, job promotion) and bad (loss of job, loss of loved one)  -- Overload: feeling that you have too many responsibilities and can't take care of everything at once  -- Helplessness: feeling like your problems are more than you can solve  b) When possible, do something about the source of your stress: avoid hassles, limit the amount of change that is happening in your life at one time and take a break when you feel overloaded.  c) Unfortunately, many stressful situations cannot be avoided. Therefore, it is necessary to learn HOW TO MANAGE STRESS better. There are many proven methods that work and will reduce your anxiety. These include simple things like exercise, good nutrition and adequate rest. Also, there are certain techniques that are helpful: relaxation and breathing exercises, visualization, biofeedback, meditation or simply taking some time-out to clear your mind. For more information about this, consult your doctor or go to a local bookstore and review the many books and tapes available on this subject.    Mouthguards for Grinding:  Some TMJ issues are worsened by  "nightly teeth grinding. This can create muscle tension and strain on the jaw joint. Most mouthguards protect the teeth and do NOT reduce the clenching. If the goal is to reduce clenching, I recommend trying an over the counter nightguard called "SmartGuard."  This can be purchased over-the-counter and is available through vendors such as [employer] and [employer].   This mouthguard fits only on the front teeth. As a result, it prevents your molars from contacting, preventing a forceful clench. This should be worn for brief period of time (weeks or months) as it can alter the bite with prolonged use. Most people will notice improvement during this time.    "

## 2023-06-28 NOTE — PROGRESS NOTES
[facility]    Subjective:      Patient: Yan Hernandez Jr. Patient PCP: Gregorio Grijalva MD         :  1955     Sex:  male      MRN:  [id number]          Date of Visit: 2023      Chief Complaint: Otalgia (R ear pain x 6 months. Pt states the pain comes and goes)      Patient ID: Yan Hernandez Jr. is a 68 y.o. male former smoker with with a past medical history of subjective allergy, type 2 diabetes without insulin, HLD and HTN on ARB self-referred for right-sided ear pain which is intermittent in nature without any drainage hearing loss or sore throat.  Some itching of the ear again on the right.  Not aware of any jaw related issues clenching or dental problems.  Not worsened by opening and closing the jaw.    Review of Systems     Past Medical History  He has a past medical history of Acute bronchitis, Allergy, Diabetes mellitus, Diabetes mellitus, type 2, Eosinophilic esophagitis, GERD (gastroesophageal reflux disease), History of endoscopy, rhinoplasty, Hyperlipidemia, and Hypertension.    Family / Surgical / Social History  His family history includes Alzheimer's disease in his mother; Arthritis in his mother; Cancer in his father.    History reviewed. No pertinent surgical history.    Social History     Tobacco Use    Smoking status: Former     Types: Cigarettes    Smokeless tobacco: Never   Substance and Sexual Activity    Alcohol use: No    Drug use: No    Sexual activity: Not Currently       Medications  He has a current medication list which includes the following prescription(s): aspirin, fenofibrate, losartan, metformin, rosuvastatin, cetirizine, pantoprazole, and paroxetine.      Allergies  Review of patient's allergies indicates:   Allergen Reactions    Lisinopril Other (See Comments)     Causes dizziness.    Influenza virus vaccines      Gets sick with shots       All medications, allergies, and past history have been reviewed.    Objective:      Vitals:  Vitals - 1 value per visit 2023  6/28/2023 6/28/2023   SYSTOLIC 129 - -   DIASTOLIC 75 - -   Pulse 64 - -   Temp - - 98   Resp - - 16   SPO2 - - -   Weight (lb) 180 - 179.9   Weight (kg) 81.647 - 81.6   Height 67 - 67   BMI (Calculated) 28.2 - 28.2   VISIT REPORT - - -   Pain Score  - 0 -       Body surface area is 1.96 meters squared.    Physical Exam:    GENERAL  APPEARANCE -  alert, appears stated age, and cooperative  BARRIER(S) TO COMMUNICATION -  none VOICE - appropriate for age and gender    INTEGUMENTARY  no suspicious head and neck lesions    HEENT  HEAD: Normocephalic, without obvious abnormality, atraumatic  FACE: INSPECTION - Symmetric, no signs of trauma, no suspicious lesion(s)  PALPATION -  No masses no pop or tenderness or crepitus of TMJ/masseter SALIVARY GLANDS - non-tender with no appreciable mass  STRENGTH - facial symmetry  NECK/THYROID: normal atraumatic, no neck masses, normal thyroid, no jvd    EYES  Normal occular alignment and mobility with no visible nystagmus at rest    EARS/NOSE/MOUTH/THROAT  EARS  PINNAE AND EXTERNAL EARS - no suspicious lesion OTOSCOPIC EXAM (surgical microscopy was used for visualization/instrumentation): EAR EXAM - excessive wax curetted clear on the right in area of discomfort with normal underlying skin, normal EAC, TM and ME's bilaterally  HEARING - grossly intact to voice/finger rub    NOSE AND SINUSES  EXTERNAL NOSE - Grossly normal for age/sex  SEPTUM - normal/no obstruction on anterior exam without decongestion TURBINATES - within normal limits MUCOSA - within normal limits     MOUTH AND THROAT   ORAL CAVITY, LIPS, TEETH, GUMS & TONGUE - moist, no suspicious lesions  OROPHARYNX /TONSILS/PHARYNGEAL WALLS/HYPOPHARYNX - no erythema or exudates  NASOPHARYNX - limited mirror exam - unable to visualize due to anatomy/gag  LARYNX -  - limited mirror exam - unable to visualize due to anatomy/gag      CHEST AND LUNG   INSPECTION & AUSCULTATION - normal effort, no  stridor    CARDIOVASCULAR  AUSCULTATION & PERIPHERAL VASCULAR - regular rate and rhythm.    NEUROLOGIC  MENTAL STATUS - alert, interactive CRANIAL NERVES - normal    LYMPHATIC  HEAD AND NECK - non-palpable      Procedure(s):  Cerumen removal performed.  See procedure note.    Labs:  WBC   Date Value Ref Range Status   06/23/2021 5.40 3.90 - 12.70 K/uL Final     Hemoglobin   Date Value Ref Range Status   06/23/2021 14.6 14.0 - 18.0 g/dL Final     Platelets   Date Value Ref Range Status   06/23/2021 253 150 - 450 K/uL Final     Creatinine   Date Value Ref Range Status   02/02/2023 1.0 0.5 - 1.4 mg/dL Final     TSH   Date Value Ref Range Status   06/23/2021 1.730 0.340 - 5.600 uIU/mL Final     Glucose   Date Value Ref Range Status   02/02/2023 144 (H) 70 - 110 mg/dL Final     Hemoglobin A1C   Date Value Ref Range Status   02/02/2023 7.1 (H) 4.5 - 6.2 % Final     Comment:     According to ADA guidelines, hemoglobin A1C <7.0% represents  optimal control in non-pregnant diabetic patients.  Different  metrics may apply to specific populations.   Standards of Medical Care in Diabetes - 2016.    For the purpose of screening for the presence of diabetes:  <5.7%     Consistent with the absence of diabetes  5.7-6.4%  Consistent with increasing risk for diabetes   (prediabetes)  >or=6.5%  Consistent with diabetes    Currently no consensus exists for use of hemoglobin A1C  for diagnosis of diabetes for children.           Assessment:      Problem List Items Addressed This Visit    None  Visit Diagnoses       Acute otalgia, right    -  Primary    Itching of ear        TMJPDS (temporomandibular joint pain dysfunction syndrome)                     Plan:      No clear cause of the ear pain.  Temporomandibular joint dysfunction maybe responsible and information as provided.  A course of ibuprofen taken regularly 3 or 4 tablets 3 times daily for a week with meals maybe beneficial in addition to stretches heat and exercises provided.       There is some itching of the ear which will be treated with fluocinolone/derm otic oil for a week then routine ear care with mineral oil as outlined and discussed.      If these measures do not resolve the discomfort, patient is to call the office to arrange CT scanning or MRI scanning of the neck to rule out any hidden causes for pain in the ear not appreciate on examination today and follow-up afterwards to discuss results and next steps.

## 2023-08-02 ENCOUNTER — OFFICE VISIT (OUTPATIENT)
Dept: FAMILY MEDICINE | Facility: CLINIC | Age: 68
End: 2023-08-02
Payer: MEDICARE

## 2023-08-02 ENCOUNTER — TELEPHONE (OUTPATIENT)
Dept: FAMILY MEDICINE | Facility: CLINIC | Age: 68
End: 2023-08-02

## 2023-08-02 VITALS
SYSTOLIC BLOOD PRESSURE: 138 MMHG | HEIGHT: 67 IN | BODY MASS INDEX: 27.78 KG/M2 | DIASTOLIC BLOOD PRESSURE: 80 MMHG | HEART RATE: 71 BPM | WEIGHT: 177 LBS

## 2023-08-02 DIAGNOSIS — E78.00 HYPERCHOLESTEREMIA: ICD-10-CM

## 2023-08-02 DIAGNOSIS — I10 ESSENTIAL HYPERTENSION: Primary | ICD-10-CM

## 2023-08-02 DIAGNOSIS — E11.9 TYPE 2 DIABETES MELLITUS WITHOUT COMPLICATION, WITHOUT LONG-TERM CURRENT USE OF INSULIN: ICD-10-CM

## 2023-08-02 DIAGNOSIS — F41.1 ANXIETY, GENERALIZED: Chronic | ICD-10-CM

## 2023-08-02 DIAGNOSIS — K21.9 GASTROESOPHAGEAL REFLUX DISEASE WITHOUT ESOPHAGITIS: ICD-10-CM

## 2023-08-02 PROCEDURE — 3288F FALL RISK ASSESSMENT DOCD: CPT | Mod: CPTII,S$GLB,, | Performed by: INTERNAL MEDICINE

## 2023-08-02 PROCEDURE — 1160F RVW MEDS BY RX/DR IN RCRD: CPT | Mod: CPTII,S$GLB,, | Performed by: INTERNAL MEDICINE

## 2023-08-02 PROCEDURE — 3079F DIAST BP 80-89 MM HG: CPT | Mod: CPTII,S$GLB,, | Performed by: INTERNAL MEDICINE

## 2023-08-02 PROCEDURE — 3008F PR BODY MASS INDEX (BMI) DOCUMENTED: ICD-10-PCS | Mod: CPTII,S$GLB,, | Performed by: INTERNAL MEDICINE

## 2023-08-02 PROCEDURE — 3075F SYST BP GE 130 - 139MM HG: CPT | Mod: CPTII,S$GLB,, | Performed by: INTERNAL MEDICINE

## 2023-08-02 PROCEDURE — 99213 OFFICE O/P EST LOW 20 MIN: CPT | Mod: S$GLB,,, | Performed by: INTERNAL MEDICINE

## 2023-08-02 PROCEDURE — 4010F PR ACE/ARB THEARPY RXD/TAKEN: ICD-10-PCS | Mod: CPTII,S$GLB,, | Performed by: INTERNAL MEDICINE

## 2023-08-02 PROCEDURE — 1101F PT FALLS ASSESS-DOCD LE1/YR: CPT | Mod: CPTII,S$GLB,, | Performed by: INTERNAL MEDICINE

## 2023-08-02 PROCEDURE — 3061F NEG MICROALBUMINURIA REV: CPT | Mod: CPTII,S$GLB,, | Performed by: INTERNAL MEDICINE

## 2023-08-02 PROCEDURE — 3008F BODY MASS INDEX DOCD: CPT | Mod: CPTII,S$GLB,, | Performed by: INTERNAL MEDICINE

## 2023-08-02 PROCEDURE — 1160F PR REVIEW ALL MEDS BY PRESCRIBER/CLIN PHARMACIST DOCUMENTED: ICD-10-PCS | Mod: CPTII,S$GLB,, | Performed by: INTERNAL MEDICINE

## 2023-08-02 PROCEDURE — 3051F PR MOST RECENT HEMOGLOBIN A1C LEVEL 7.0 - < 8.0%: ICD-10-PCS | Mod: CPTII,S$GLB,, | Performed by: INTERNAL MEDICINE

## 2023-08-02 PROCEDURE — 3288F PR FALLS RISK ASSESSMENT DOCUMENTED: ICD-10-PCS | Mod: CPTII,S$GLB,, | Performed by: INTERNAL MEDICINE

## 2023-08-02 PROCEDURE — 1159F MED LIST DOCD IN RCRD: CPT | Mod: CPTII,S$GLB,, | Performed by: INTERNAL MEDICINE

## 2023-08-02 PROCEDURE — 99213 PR OFFICE/OUTPT VISIT, EST, LEVL III, 20-29 MIN: ICD-10-PCS | Mod: S$GLB,,, | Performed by: INTERNAL MEDICINE

## 2023-08-02 PROCEDURE — 4010F ACE/ARB THERAPY RXD/TAKEN: CPT | Mod: CPTII,S$GLB,, | Performed by: INTERNAL MEDICINE

## 2023-08-02 PROCEDURE — 3066F NEPHROPATHY DOC TX: CPT | Mod: CPTII,S$GLB,, | Performed by: INTERNAL MEDICINE

## 2023-08-02 PROCEDURE — 3079F PR MOST RECENT DIASTOLIC BLOOD PRESSURE 80-89 MM HG: ICD-10-PCS | Mod: CPTII,S$GLB,, | Performed by: INTERNAL MEDICINE

## 2023-08-02 PROCEDURE — 1101F PR PT FALLS ASSESS DOC 0-1 FALLS W/OUT INJ PAST YR: ICD-10-PCS | Mod: CPTII,S$GLB,, | Performed by: INTERNAL MEDICINE

## 2023-08-02 PROCEDURE — 1159F PR MEDICATION LIST DOCUMENTED IN MEDICAL RECORD: ICD-10-PCS | Mod: CPTII,S$GLB,, | Performed by: INTERNAL MEDICINE

## 2023-08-02 PROCEDURE — 1126F AMNT PAIN NOTED NONE PRSNT: CPT | Mod: CPTII,S$GLB,, | Performed by: INTERNAL MEDICINE

## 2023-08-02 PROCEDURE — 1126F PR PAIN SEVERITY QUANTIFIED, NO PAIN PRESENT: ICD-10-PCS | Mod: CPTII,S$GLB,, | Performed by: INTERNAL MEDICINE

## 2023-08-02 PROCEDURE — 3075F PR MOST RECENT SYSTOLIC BLOOD PRESS GE 130-139MM HG: ICD-10-PCS | Mod: CPTII,S$GLB,, | Performed by: INTERNAL MEDICINE

## 2023-08-02 PROCEDURE — 3061F PR NEG MICROALBUMINURIA RESULT DOCUMENTED/REVIEW: ICD-10-PCS | Mod: CPTII,S$GLB,, | Performed by: INTERNAL MEDICINE

## 2023-08-02 PROCEDURE — 3051F HG A1C>EQUAL 7.0%<8.0%: CPT | Mod: CPTII,S$GLB,, | Performed by: INTERNAL MEDICINE

## 2023-08-02 PROCEDURE — 3066F PR DOCUMENTATION OF TREATMENT FOR NEPHROPATHY: ICD-10-PCS | Mod: CPTII,S$GLB,, | Performed by: INTERNAL MEDICINE

## 2023-08-02 NOTE — PROGRESS NOTES
Subjective:       Patient ID: Yan Hernandez Jr. is a 68 y.o. male.    Chief Complaint: Diabetes, Hyperlipidemia, Hypertension, Gastroesophageal Reflux, and Mood Disorder    Patient is a 68-year-old gentleman who comes for 6 month  follow-up.  Underlying medical issues are as below:-    1. Essential hypertension   2. Type 2 diabetes mellitus without complication, without long-term current use of insulin   3. Hypercholesteremia   4. Gastroesophageal reflux disease without esophagitis   5. Anxiety, generalized   6. Positive MEDARDO (antinuclear antibody)     Life is generally okay.  Daily walking has been reduced because of the summer heat.    Current hemoglobin A1c 7.1 and he asks me what is the ideal are normal hemoglobin A1c.  I have given him the numbers.  Less than 5.7 is considered to be normal.  Between 5.7 and 6.5 is considered to be prediabetes.    My goal is to get his A1c less than 7.0 and closer to 6.5.      On previous occasions we had discussed about his longstanding anxiety especially when he was working and if he had reached his station and junction in his life when situation is easing and he may consider weaning off the medications slowly.  Due to prevailing political situation in the country as well as COVID-19 situations and at that point he did not feel he was ready to wean off.    Foot examination has been done today and he has been advised to get update on ophthalmology examination.      Discussed about immunizations and he remains somewhat indifferent to them.      Hypertension  This is a chronic problem. The current episode started more than 1 year ago. The problem is controlled. Associated symptoms include anxiety. Pertinent negatives include no palpitations or shortness of breath. Risk factors for coronary artery disease include male gender, diabetes mellitus and obesity. Past treatments include angiotensin blockers. The current treatment provides moderate improvement. Compliance problems include  psychosocial issues.  There is no history of chronic renal disease, hyperaldosteronism or renovascular disease.   Diabetes  He presents for his follow-up diabetic visit. He has type 2 diabetes mellitus. The initial diagnosis of diabetes was made 10 years ago. His disease course has been stable. Hypoglycemia symptoms include nervousness/anxiousness. Pertinent negatives for hypoglycemia include no confusion or pallor. Pertinent negatives for diabetes include no fatigue, no polydipsia, no polyphagia and no polyuria. There are no hypoglycemic complications. There are no diabetic complications. Risk factors for coronary artery disease include male sex, obesity, hypertension, dyslipidemia and diabetes mellitus. Current diabetic treatment includes oral agent (monotherapy) (Metformin 850 mg b.i.d.). He is compliant with treatment some of the time. Meal planning includes avoidance of concentrated sweets. He participates in exercise intermittently. An ACE inhibitor/angiotensin II receptor blocker is being taken. He does not see a podiatrist.  Hyperlipidemia  This is a chronic problem. The current episode started more than 1 year ago. The problem is controlled. He has no history of chronic renal disease or obesity. Pertinent negatives include no shortness of breath. Current antihyperlipidemic treatment includes statins and fibric acid derivatives. Risk factors for coronary artery disease include hypertension, male sex, dyslipidemia and diabetes mellitus.   Anxiety  Presents for follow-up visit. Symptoms include nervous/anxious behavior. Patient reports no confusion, nausea, palpitations, shortness of breath or suicidal ideas. Primary symptoms comment: Currently stable on Paxil..           Past Medical History:   Diagnosis Date    Acute bronchitis 6/1/2017    Allergy     atorvastatin, Influenza vaccine    Diabetes mellitus     Diabetes mellitus, type 2     Eosinophilic esophagitis 2/18/2020    Based upon endoscopy done by   Clark Sprague.    GERD (gastroesophageal reflux disease)     History of endoscopy 2021    Dr. Clark Sprgaue.  Esophagus appears to have mucosal like eosinophilic esophagitis.  Pathology did not indicate any evidence of malignancy.    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     Employer: [employer]   Tobacco Use    Smoking status: Former     Current packs/day: 0.00     Types: Cigarettes     Quit date: 1975     Years since quittin.6    Smokeless tobacco: Never   Substance and Sexual Activity    Alcohol use: No    Drug use: No    Sexual activity: Not Currently     Social Determinants of Health     Financial Resource Strain: Low Risk  (2022)    Overall Financial Resource Strain (CARDIA)     Difficulty of Paying Living Expenses: Not very hard   Food Insecurity: No Food Insecurity (2022)    Hunger Vital Sign     Worried About Running Out of Food in the Last Year: Never true     Ran Out of Food in the Last Year: Never true   Transportation Needs: No Transportation Needs (2022)    PRAPARE - Transportation     Lack of Transportation (Medical): No     Lack of Transportation (Non-Medical): No   Physical Activity: Inactive (2022)    Exercise Vital Sign     Days of Exercise per Week: 0 days     Minutes of Exercise per Session: 0 min   Stress: Stress Concern Present (2022)    [place] [employer] - Occupational Stress Questionnaire     Feeling of Stress : To some extent   Social Connections: Moderately Isolated (2022)    Social Connection and Isolation Panel [NHANES]     Frequency of Communication with Friends and Family: Three times a week     Frequency of Social Gatherings with Friends and Family: Twice a week     Attends Restorationist Services: 1 to 4 times per year     Active Member of  "Clubs or Organizations: No     Attends Club or Organization Meetings: Never     Marital Status: Never    Housing Stability: Low Risk  (7/16/2022)    Housing Stability Vital Sign     Unable to Pay for Housing in the Last Year: No     Number of Places Lived in the Last Year: 1     Unstable Housing in the Last Year: No     History reviewed. No pertinent surgical history.  Family History   Problem Relation Age of Onset    Arthritis Mother     Alzheimer's disease Mother     Cancer Father        Review of Systems   Constitutional:  Negative for activity change, appetite change, fatigue, fever and unexpected weight change (lost 2 lbs).   HENT:  Negative for congestion, facial swelling and postnasal drip.    Eyes:  Negative for pain, discharge and visual disturbance.   Respiratory:  Negative for cough, chest tightness and shortness of breath.    Cardiovascular:  Negative for palpitations.        Hypertension   Gastrointestinal:  Negative for abdominal distention, anal bleeding and nausea.   Endocrine: Negative for cold intolerance, polydipsia, polyphagia and polyuria.        Type 2 diabetes, hyperlipidemia.   Skin:  Negative for pallor.   Psychiatric/Behavioral:  Negative for confusion and suicidal ideas. The patient is nervous/anxious.          Objective:      Blood pressure 138/80, pulse 71, height 5' 7" (1.702 m), weight 80.3 kg (177 lb). Body mass index is 27.72 kg/m².  Physical Exam  Vitals and nursing note reviewed.   Constitutional:       General: He is not in acute distress.     Appearance: He is well-developed. He is obese. He is not ill-appearing, toxic-appearing or diaphoretic.      Comments: BMI 27.72   HENT:      Head: Normocephalic and atraumatic.   Eyes:      General: No scleral icterus.  Neck:      Thyroid: No thyromegaly.      Vascular: No carotid bruit or JVD.      Trachea: No tracheal deviation.   Cardiovascular:      Rate and Rhythm: Normal rate and regular rhythm.      Heart sounds: Normal heart " sounds. No murmur heard.     No friction rub. No gallop.   Pulmonary:      Effort: Pulmonary effort is normal.      Breath sounds: Normal breath sounds.   Abdominal:      General: There is no distension.      Palpations: Abdomen is soft. There is no mass.      Hernia: No hernia is present.   Musculoskeletal:      Cervical back: Neck supple. No muscular tenderness.      Right lower leg: No edema.      Left lower leg: No edema.      Right foot: Normal range of motion. No deformity.      Left foot: Normal range of motion. No deformity.   Feet:      Right foot:      Protective Sensation: 5 sites tested.  5 sites sensed.      Skin integrity: No ulcer or blister.      Toenail Condition: Right toenails are normal.      Left foot:      Protective Sensation: 5 sites tested.  5 sites sensed.      Skin integrity: No ulcer or blister.      Toenail Condition: Left toenails are normal.   Lymphadenopathy:      Cervical: No cervical adenopathy.   Skin:     General: Skin is warm and dry.      Findings: No lesion or rash.   Neurological:      Mental Status: He is alert. Mental status is at baseline.      Gait: Gait and tandem walk normal.   Psychiatric:         Attention and Perception: Attention and perception normal.         Behavior: Behavior normal.      Comments: Note to be somewhat anhedonic and dysthymic in past though he consistently maintains an ebullient mood in person           Assessment:               Essential hypertension  Comments:  Stable blood pressure.    Hypercholesteremia  Comments:  Stable lipids.  Tolerating medications without any problems  Orders:  -     Basic Metabolic Panel; Future; Expected date: 11/02/2023    Type 2 diabetes mellitus without complication, without long-term current use of insulin  Comments:  Continue metformin.  Goal is to bring hemoglobin A1c closer to 6.5.  Orders:  -     Hemoglobin A1C; Future; Expected date: 11/02/2023  -     Basic Metabolic Panel; Future; Expected date:  11/02/2023    Gastroesophageal reflux disease without esophagitis  Comments:  Pt wants to  wean off pantoprazole.    Anxiety, generalized  Comments:  Continue with Paxil.  Not ready to wean off.       No visits with results within 3 Month(s) from this visit.   Latest known visit with results is:   Lab Visit on 02/02/2023   Component Date Value Ref Range Status    PSA, Screen 02/02/2023 0.50  0.00 - 4.00 ng/mL Final    Sodium 02/02/2023 136  136 - 145 mmol/L Final    Potassium 02/02/2023 4.4  3.5 - 5.1 mmol/L Final    Chloride 02/02/2023 103  95 - 110 mmol/L Final    CO2 02/02/2023 26  23 - 29 mmol/L Final    Glucose 02/02/2023 144 (H)  70 - 110 mg/dL Final    BUN 02/02/2023 11  8 - 23 mg/dL Final    Creatinine 02/02/2023 1.0  0.5 - 1.4 mg/dL Final    Calcium 02/02/2023 9.5  8.7 - 10.5 mg/dL Final    Total Protein 02/02/2023 7.9  6.0 - 8.4 g/dL Final    Albumin 02/02/2023 4.8  3.5 - 5.2 g/dL Final    Total Bilirubin 02/02/2023 0.8  0.1 - 1.0 mg/dL Final    Alkaline Phosphatase 02/02/2023 37 (L)  55 - 135 U/L Final    AST 02/02/2023 39  10 - 40 U/L Final    ALT 02/02/2023 42  10 - 44 U/L Final    Anion Gap 02/02/2023 7 (L)  8 - 16 mmol/L Final    eGFR 02/02/2023 >60.0  >60 mL/min/1.73 m^2 Final    Cholesterol 02/02/2023 137  120 - 199 mg/dL Final    Triglycerides 02/02/2023 130  30 - 150 mg/dL Final    HDL 02/02/2023 37 (L)  40 - 75 mg/dL Final    LDL Cholesterol 02/02/2023 74.0  63.0 - 159.0 mg/dL Final    HDL/Cholesterol Ratio 02/02/2023 27.0  20.0 - 50.0 % Final    Total Cholesterol/HDL Ratio 02/02/2023 3.7  2.0 - 5.0 Final    Non-HDL Cholesterol 02/02/2023 100  mg/dL Final    Hemoglobin A1C 02/02/2023 7.1 (H)  4.5 - 6.2 % Final    Estimated Avg Glucose 02/02/2023 157 (H)  68 - 131 mg/dL Final    Microalbumin, Urine 02/02/2023 8.6  <19.9 ug/mL Final    Creatinine, Urine 02/02/2023 168.0  23.0 - 375.0 mg/dL Final    Microalb/Creat Ratio 02/02/2023 5.1  0.0 - 30.0 ug/mg Final     Component Ref Range & Units 6 mo  ago  (2/2/23) 1 yr ago  (3/3/22) 1 yr ago  (3/3/22) 2 yr ago  (6/23/21) 2 yr ago  (3/17/21) 2 yr ago  (10/15/20) 3 yr ago  (8/7/19)   Hemoglobin A1C 4.5 - 6.2 % 7.1 High   7.2 Abnormal  R  SEE COMMENT CM  7.1 High  CM  7.4 High  CM  7.9 High  CM  6.6 High        Plan:           Essential hypertension  Comments:  Stable blood pressure.    Hypercholesteremia  Comments:  Stable lipids.  Tolerating medications without any problems  Orders:  -     Basic Metabolic Panel; Future; Expected date: 11/02/2023    Type 2 diabetes mellitus without complication, without long-term current use of insulin  Comments:  Continue metformin.  Goal is to bring hemoglobin A1c closer to 6.5.  Orders:  -     Hemoglobin A1C; Future; Expected date: 11/02/2023  -     Basic Metabolic Panel; Future; Expected date: 11/02/2023    Gastroesophageal reflux disease without esophagitis  Comments:  Pt wants to  wean off pantoprazole.    Anxiety, generalized  Comments:  Continue with Paxil.  Not ready to wean off.      Diabetes control can be better.  His recent hemoglobin A1c is 7.1.    Will check his A1c again in 3 months time.      He would like to wean himself off pantoprazole.    I would suggest the following regimen:-    1.-take pantoprazole on Monday, Wednesday and Friday for 2 weeks.  2.-take pantoprazole on Monday and Thursday for next 2 weeks.  3.-then take pantoprazole only as needed or on the days that he knows that he is going to take food which may bother his stomach.      Of course he can take Pepcid for quick action in case he has heartburn.  Pepcid is safer as compared to pantoprazole for long-term use.    Last but not the least please continue to watch her diet and avoid greasy food, spicy food, oily food and too much of caffeine.  He does drink lot of plain water.    Otherwise life is okay.  He is trying to keep himself safe in the summer heat but once the weather is  better, I would recommend him to start walking and  exercise.    Discussed about vaccinations.  Declines at this point.    Patient to clarify if he wanted to wean off pantoprazole or paroxetine.  Given similar sounding names.    Fup 3-4 months      Current Outpatient Medications:     aspirin (ECOTRIN) 81 MG EC tablet, Take 1 tablet (81 mg total) by mouth once daily., Disp: 100 tablet, Rfl: 4    cetirizine (ZYRTEC) 10 MG tablet, TAKE 1 TABLET BY MOUTH ONCE DAILY AS NEEDED FOR ALLERGIES, Disp: , Rfl:     fenofibrate 160 MG Tab, Take 1 tablet (160 mg total) by mouth every evening., Disp: 90 tablet, Rfl: 2    losartan (COZAAR) 50 MG tablet, Take 1 tablet by mouth once daily, Disp: 30 tablet, Rfl: 8    metFORMIN (GLUCOPHAGE) 1000 MG tablet, TAKE 1 TABLET BY MOUTH TWICE DAILY WITH MEALS, Disp: 180 tablet, Rfl: 0    pantoprazole (PROTONIX) 20 MG tablet, Take 20 mg by mouth., Disp: , Rfl:     paroxetine (PAXIL-CR) 25 MG 24 hr tablet, Take 1 tablet by mouth once daily, Disp: 90 tablet, Rfl: 1    rosuvastatin (CRESTOR) 20 MG tablet, Take 20 mg by mouth once daily., Disp: , Rfl:

## 2023-08-02 NOTE — TELEPHONE ENCOUNTER
----- Message from Gregorio Grijalva MD sent at 8/2/2023  1:03 PM CDT -----  Regarding: today visit  Patient to clarify if he wanted to wean off pantoprazole or paroxetine.  Given similar sounding names.    Patient did state that he wanted to wean off pantoprazole but I am not sure if he was meaning paroxetine which is for depression/anxiety and pantoprazole for reflux.    Dr. Valentine PAVON

## 2023-10-20 DIAGNOSIS — F41.1 ANXIETY, GENERALIZED: ICD-10-CM

## 2023-10-20 DIAGNOSIS — E11.9 TYPE 2 DIABETES MELLITUS WITHOUT COMPLICATION, WITHOUT LONG-TERM CURRENT USE OF INSULIN: Chronic | ICD-10-CM

## 2023-10-23 RX ORDER — PAROXETINE HYDROCHLORIDE HEMIHYDRATE 25 MG/1
TABLET, FILM COATED, EXTENDED RELEASE ORAL
Qty: 90 TABLET | Refills: 1 | Status: SHIPPED | OUTPATIENT
Start: 2023-10-23

## 2023-10-23 RX ORDER — METFORMIN HYDROCHLORIDE 1000 MG/1
TABLET ORAL
Qty: 180 TABLET | Refills: 1 | Status: SHIPPED | OUTPATIENT
Start: 2023-10-23

## 2023-12-14 ENCOUNTER — OFFICE VISIT (OUTPATIENT)
Dept: FAMILY MEDICINE | Facility: CLINIC | Age: 68
End: 2023-12-14
Payer: MEDICARE

## 2023-12-14 VITALS
SYSTOLIC BLOOD PRESSURE: 134 MMHG | HEIGHT: 67 IN | WEIGHT: 175 LBS | DIASTOLIC BLOOD PRESSURE: 72 MMHG | BODY MASS INDEX: 27.47 KG/M2 | HEART RATE: 67 BPM

## 2023-12-14 DIAGNOSIS — E11.9 TYPE 2 DIABETES MELLITUS WITHOUT COMPLICATION, WITHOUT LONG-TERM CURRENT USE OF INSULIN: ICD-10-CM

## 2023-12-14 DIAGNOSIS — E78.00 HYPERCHOLESTEREMIA: Chronic | ICD-10-CM

## 2023-12-14 DIAGNOSIS — N50.812 TESTICULAR PAIN, LEFT: Chronic | ICD-10-CM

## 2023-12-14 DIAGNOSIS — I10 ESSENTIAL HYPERTENSION: Primary | ICD-10-CM

## 2023-12-14 PROCEDURE — 3051F HG A1C>EQUAL 7.0%<8.0%: CPT | Mod: CPTII,S$GLB,, | Performed by: INTERNAL MEDICINE

## 2023-12-14 PROCEDURE — 3078F PR MOST RECENT DIASTOLIC BLOOD PRESSURE < 80 MM HG: ICD-10-PCS | Mod: CPTII,S$GLB,, | Performed by: INTERNAL MEDICINE

## 2023-12-14 PROCEDURE — 3066F PR DOCUMENTATION OF TREATMENT FOR NEPHROPATHY: ICD-10-PCS | Mod: CPTII,S$GLB,, | Performed by: INTERNAL MEDICINE

## 2023-12-14 PROCEDURE — 99214 OFFICE O/P EST MOD 30 MIN: CPT | Mod: S$GLB,,, | Performed by: INTERNAL MEDICINE

## 2023-12-14 PROCEDURE — 1160F RVW MEDS BY RX/DR IN RCRD: CPT | Mod: CPTII,S$GLB,, | Performed by: INTERNAL MEDICINE

## 2023-12-14 PROCEDURE — 1126F PR PAIN SEVERITY QUANTIFIED, NO PAIN PRESENT: ICD-10-PCS | Mod: CPTII,S$GLB,, | Performed by: INTERNAL MEDICINE

## 2023-12-14 PROCEDURE — 3061F NEG MICROALBUMINURIA REV: CPT | Mod: CPTII,S$GLB,, | Performed by: INTERNAL MEDICINE

## 2023-12-14 PROCEDURE — 3008F PR BODY MASS INDEX (BMI) DOCUMENTED: ICD-10-PCS | Mod: CPTII,S$GLB,, | Performed by: INTERNAL MEDICINE

## 2023-12-14 PROCEDURE — 1101F PT FALLS ASSESS-DOCD LE1/YR: CPT | Mod: CPTII,S$GLB,, | Performed by: INTERNAL MEDICINE

## 2023-12-14 PROCEDURE — 99214 PR OFFICE/OUTPT VISIT, EST, LEVL IV, 30-39 MIN: ICD-10-PCS | Mod: S$GLB,,, | Performed by: INTERNAL MEDICINE

## 2023-12-14 PROCEDURE — 3066F NEPHROPATHY DOC TX: CPT | Mod: CPTII,S$GLB,, | Performed by: INTERNAL MEDICINE

## 2023-12-14 PROCEDURE — 3078F DIAST BP <80 MM HG: CPT | Mod: CPTII,S$GLB,, | Performed by: INTERNAL MEDICINE

## 2023-12-14 PROCEDURE — 1159F PR MEDICATION LIST DOCUMENTED IN MEDICAL RECORD: ICD-10-PCS | Mod: CPTII,S$GLB,, | Performed by: INTERNAL MEDICINE

## 2023-12-14 PROCEDURE — 4010F ACE/ARB THERAPY RXD/TAKEN: CPT | Mod: CPTII,S$GLB,, | Performed by: INTERNAL MEDICINE

## 2023-12-14 PROCEDURE — 3051F PR MOST RECENT HEMOGLOBIN A1C LEVEL 7.0 - < 8.0%: ICD-10-PCS | Mod: CPTII,S$GLB,, | Performed by: INTERNAL MEDICINE

## 2023-12-14 PROCEDURE — 1160F PR REVIEW ALL MEDS BY PRESCRIBER/CLIN PHARMACIST DOCUMENTED: ICD-10-PCS | Mod: CPTII,S$GLB,, | Performed by: INTERNAL MEDICINE

## 2023-12-14 PROCEDURE — 3061F PR NEG MICROALBUMINURIA RESULT DOCUMENTED/REVIEW: ICD-10-PCS | Mod: CPTII,S$GLB,, | Performed by: INTERNAL MEDICINE

## 2023-12-14 PROCEDURE — 3288F FALL RISK ASSESSMENT DOCD: CPT | Mod: CPTII,S$GLB,, | Performed by: INTERNAL MEDICINE

## 2023-12-14 PROCEDURE — 3075F PR MOST RECENT SYSTOLIC BLOOD PRESS GE 130-139MM HG: ICD-10-PCS | Mod: CPTII,S$GLB,, | Performed by: INTERNAL MEDICINE

## 2023-12-14 PROCEDURE — 1126F AMNT PAIN NOTED NONE PRSNT: CPT | Mod: CPTII,S$GLB,, | Performed by: INTERNAL MEDICINE

## 2023-12-14 PROCEDURE — 1101F PR PT FALLS ASSESS DOC 0-1 FALLS W/OUT INJ PAST YR: ICD-10-PCS | Mod: CPTII,S$GLB,, | Performed by: INTERNAL MEDICINE

## 2023-12-14 PROCEDURE — 3288F PR FALLS RISK ASSESSMENT DOCUMENTED: ICD-10-PCS | Mod: CPTII,S$GLB,, | Performed by: INTERNAL MEDICINE

## 2023-12-14 PROCEDURE — 4010F PR ACE/ARB THEARPY RXD/TAKEN: ICD-10-PCS | Mod: CPTII,S$GLB,, | Performed by: INTERNAL MEDICINE

## 2023-12-14 PROCEDURE — 3008F BODY MASS INDEX DOCD: CPT | Mod: CPTII,S$GLB,, | Performed by: INTERNAL MEDICINE

## 2023-12-14 PROCEDURE — 1159F MED LIST DOCD IN RCRD: CPT | Mod: CPTII,S$GLB,, | Performed by: INTERNAL MEDICINE

## 2023-12-14 PROCEDURE — 3075F SYST BP GE 130 - 139MM HG: CPT | Mod: CPTII,S$GLB,, | Performed by: INTERNAL MEDICINE

## 2023-12-14 NOTE — PROGRESS NOTES
Subjective:       Patient ID: Yan Hernandez Jr. is a 68 y.o. male.    Chief Complaint: Diabetes, Follow-up, Hypertension, Hyperlipidemia, and Testicle Pain    Patient is a 68-year-old gentleman who comes for 4-6 month  follow-up.      Left testicle is hurting him now.  This has been getting worse off late since last few weeks.  He does not recall any injury or trauma.  He has not sexually active.    Underlying medical issues are as below:-    1. Essential hypertension   2. Type 2 diabetes mellitus without complication, without long-term current use of insulin   3. Hypercholesteremia   4. Gastroesophageal reflux disease without esophagitis   5. Anxiety, generalized   6. Positive MEDARDO (antinuclear antibody)     Slowly and steadily he is settling down in life.  He is currently on Paxil        Hypertension  This is a chronic problem. The current episode started more than 1 year ago. The problem is controlled. Associated symptoms include anxiety. Pertinent negatives include no palpitations or shortness of breath. Risk factors for coronary artery disease include male gender, diabetes mellitus and obesity. Past treatments include angiotensin blockers. The current treatment provides moderate improvement. Compliance problems include psychosocial issues.  There is no history of chronic renal disease, hyperaldosteronism or renovascular disease.   Diabetes  He presents for his follow-up diabetic visit. He has type 2 diabetes mellitus. The initial diagnosis of diabetes was made 10 years ago. His disease course has been stable. Hypoglycemia symptoms include nervousness/anxiousness. Pertinent negatives for hypoglycemia include no confusion or pallor. Pertinent negatives for diabetes include no fatigue, no polydipsia, no polyphagia and no polyuria. There are no hypoglycemic complications. There are no diabetic complications. Risk factors for coronary artery disease include male sex, obesity, hypertension, dyslipidemia and diabetes  mellitus. Current diabetic treatment includes oral agent (monotherapy) (Metformin 850 mg b.i.d.). He is compliant with treatment some of the time. Meal planning includes avoidance of concentrated sweets. He participates in exercise intermittently. An ACE inhibitor/angiotensin II receptor blocker is being taken. He does not see a podiatrist.  Hyperlipidemia  This is a chronic problem. The current episode started more than 1 year ago. The problem is controlled. He has no history of chronic renal disease or obesity. Pertinent negatives include no myalgias or shortness of breath. Current antihyperlipidemic treatment includes statins and fibric acid derivatives. Risk factors for coronary artery disease include hypertension, male sex, dyslipidemia and diabetes mellitus.   Anxiety  Presents for follow-up visit. Symptoms include nervous/anxious behavior. Patient reports no confusion, nausea, palpitations, shortness of breath or suicidal ideas. Primary symptoms comment: Currently stable on Paxil..       Testicle Pain  The patient's primary symptoms include priapism and testicular pain. The patient's pertinent negatives include no genital injury, genital itching or genital lesions. Primary symptoms comment: Currently stable on Paxil.. This is a new problem. The current episode started 1 to 4 weeks ago. The problem occurs constantly. The problem has been waxing and waning. The pain is moderate. Pertinent negatives include no anorexia, chills, coughing, fever, flank pain, hematuria, nausea or shortness of breath. The testicular pain affects the left testicle. The color of the testicles is Normal. Nothing aggravates the symptoms. He has tried rest for the symptoms. The treatment provided moderate relief.       Past Medical History:   Diagnosis Date    Acute bronchitis 6/1/2017    Allergy     atorvastatin, Influenza vaccine    Diabetes mellitus     Diabetes mellitus, type 2     Eosinophilic esophagitis 2/18/2020    Based upon  endoscopy done by Dr. Clark Sprague.    GERD (gastroesophageal reflux disease)     History of endoscopy 2021    Dr. Clark Sprague.  Esophagus appears to have mucosal like eosinophilic esophagitis.  Pathology did not indicate any evidence of malignancy.    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     Employer: [employer]   Tobacco Use    Smoking status: Former     Current packs/day: 0.00     Types: Cigarettes     Quit date: 1975     Years since quittin.9    Smokeless tobacco: Never   Substance and Sexual Activity    Alcohol use: No    Drug use: No    Sexual activity: Not Currently     Social Determinants of Health     Financial Resource Strain: Low Risk  (2022)    Overall Financial Resource Strain (CARDIA)     Difficulty of Paying Living Expenses: Not very hard   Food Insecurity: No Food Insecurity (2022)    Hunger Vital Sign     Worried About Running Out of Food in the Last Year: Never true     Ran Out of Food in the Last Year: Never true   Transportation Needs: No Transportation Needs (2022)    PRAPARE - Transportation     Lack of Transportation (Medical): No     Lack of Transportation (Non-Medical): No   Physical Activity: Inactive (2022)    Exercise Vital Sign     Days of Exercise per Week: 0 days     Minutes of Exercise per Session: 0 min   Stress: Stress Concern Present (2022)    [employer] [place] [employer] - Occupational Stress Questionnaire     Feeling of Stress : To some extent   Social Connections: Moderately Isolated (2022)    Social Connection and Isolation Panel [NHANES]     Frequency of Communication with Friends and Family: Three times a week     Frequency of Social Gatherings with Friends and Family: Twice a week     Attends Synagogue Services: 1 to 4 times per  "year     Active Member of Clubs or Organizations: No     Attends Club or Organization Meetings: Never     Marital Status: Never    Housing Stability: Low Risk  (7/16/2022)    Housing Stability Vital Sign     Unable to Pay for Housing in the Last Year: No     Number of Places Lived in the Last Year: 1     Unstable Housing in the Last Year: No     History reviewed. No pertinent surgical history.  Family History   Problem Relation Age of Onset    Arthritis Mother     Alzheimer's disease Mother     Cancer Father        Review of Systems   Constitutional:  Negative for activity change, appetite change, chills, fatigue, fever and unexpected weight change (lost 2 lbs lost a few lb of weight).   HENT:  Negative for congestion, facial swelling and postnasal drip.    Eyes:  Negative for pain, discharge and visual disturbance.   Respiratory:  Negative for cough, chest tightness and shortness of breath.    Cardiovascular:  Negative for palpitations.        Hypertension   Gastrointestinal:  Negative for abdominal distention, anal bleeding, anorexia and nausea.   Endocrine: Negative for cold intolerance, polydipsia, polyphagia and polyuria.        Type 2 diabetes, hyperlipidemia.   Genitourinary:  Positive for testicular pain. Negative for enuresis and flank pain.   Musculoskeletal:  Positive for back pain. Negative for myalgias.   Skin:  Negative for pallor.   Psychiatric/Behavioral:  Negative for confusion and suicidal ideas. The patient is nervous/anxious.          Objective:      Blood pressure 134/72, pulse 67, height 5' 7" (1.702 m), weight 79.4 kg (175 lb). Body mass index is 27.41 kg/m².  Physical Exam  Vitals and nursing note reviewed.   Constitutional:       General: He is not in acute distress.     Appearance: He is well-developed. He is obese. He is not ill-appearing, toxic-appearing or diaphoretic.      Comments: BMI 27.41   HENT:      Head: Normocephalic and atraumatic.   Eyes:      General: No scleral " icterus.  Neck:      Thyroid: No thyromegaly.      Vascular: No carotid bruit or JVD.      Trachea: No tracheal deviation.   Cardiovascular:      Rate and Rhythm: Normal rate and regular rhythm.      Heart sounds: Normal heart sounds. No murmur heard.     No friction rub. No gallop.   Pulmonary:      Effort: Pulmonary effort is normal.      Breath sounds: Normal breath sounds.   Abdominal:      General: There is no distension.      Palpations: Abdomen is soft. There is no mass.      Hernia: No hernia is present.   Genitourinary:     Penis: No phimosis, erythema, discharge, swelling or lesions.       Testes:         Right: Mass, tenderness or swelling not present.         Left: Mass, tenderness or swelling not present.      Epididymis:      Right: Not inflamed or enlarged. No mass.      Left: Not inflamed or enlarged. No mass.   Musculoskeletal:      Cervical back: Neck supple. No muscular tenderness.      Right lower leg: No edema.      Left lower leg: No edema.   Lymphadenopathy:      Cervical: No cervical adenopathy.   Skin:     General: Skin is warm and dry.      Findings: No lesion or rash.   Neurological:      Mental Status: He is alert. Mental status is at baseline.      Gait: Gait and tandem walk normal.   Psychiatric:         Attention and Perception: Attention and perception normal.         Behavior: Behavior normal.      Comments: Note to be somewhat anhedonic and dysthymic in past though he consistently maintains an ebullient mood in person           Assessment:             Essential hypertension  Comments:  Losartan 50 mg per day.    Hypercholesteremia  Comments:  Currently on rosuvastatin 20 mg and fenofibrate 160 mg.    Type 2 diabetes mellitus without complication, without long-term current use of insulin  Comments:  Metformin 1000 mg twice a day.    Testicular pain, left  Comments:  No significant findings.  Check ultrasound for any potential cyst/varicocele.  Patient is mostly concerned about  cancer.  Orders:  -     Urinalysis; Future; Expected date: 12/14/2023  -     US Scrotum And Testicles; Future; Expected date: 12/21/2023         No visits with results within 3 Month(s) from this visit.   Latest known visit with results is:   Lab Visit on 02/02/2023   Component Date Value Ref Range Status    PSA, Screen 02/02/2023 0.50  0.00 - 4.00 ng/mL Final    Sodium 02/02/2023 136  136 - 145 mmol/L Final    Potassium 02/02/2023 4.4  3.5 - 5.1 mmol/L Final    Chloride 02/02/2023 103  95 - 110 mmol/L Final    CO2 02/02/2023 26  23 - 29 mmol/L Final    Glucose 02/02/2023 144 (H)  70 - 110 mg/dL Final    BUN 02/02/2023 11  8 - 23 mg/dL Final    Creatinine 02/02/2023 1.0  0.5 - 1.4 mg/dL Final    Calcium 02/02/2023 9.5  8.7 - 10.5 mg/dL Final    Total Protein 02/02/2023 7.9  6.0 - 8.4 g/dL Final    Albumin 02/02/2023 4.8  3.5 - 5.2 g/dL Final    Total Bilirubin 02/02/2023 0.8  0.1 - 1.0 mg/dL Final    Alkaline Phosphatase 02/02/2023 37 (L)  55 - 135 U/L Final    AST 02/02/2023 39  10 - 40 U/L Final    ALT 02/02/2023 42  10 - 44 U/L Final    Anion Gap 02/02/2023 7 (L)  8 - 16 mmol/L Final    eGFR 02/02/2023 >60.0  >60 mL/min/1.73 m^2 Final    Cholesterol 02/02/2023 137  120 - 199 mg/dL Final    Triglycerides 02/02/2023 130  30 - 150 mg/dL Final    HDL 02/02/2023 37 (L)  40 - 75 mg/dL Final    LDL Cholesterol 02/02/2023 74.0  63.0 - 159.0 mg/dL Final    HDL/Cholesterol Ratio 02/02/2023 27.0  20.0 - 50.0 % Final    Total Cholesterol/HDL Ratio 02/02/2023 3.7  2.0 - 5.0 Final    Non-HDL Cholesterol 02/02/2023 100  mg/dL Final    Hemoglobin A1C 02/02/2023 7.1 (H)  4.5 - 6.2 % Final    Estimated Avg Glucose 02/02/2023 157 (H)  68 - 131 mg/dL Final    Microalbumin, Urine 02/02/2023 8.6  <19.9 ug/mL Final    Creatinine, Urine 02/02/2023 168.0  23.0 - 375.0 mg/dL Final    Microalb/Creat Ratio 02/02/2023 5.1  0.0 - 30.0 ug/mg Final         Plan:           Essential hypertension  Comments:  Losartan 50 mg per  day.    Hypercholesteremia  Comments:  Currently on rosuvastatin 20 mg and fenofibrate 160 mg.    Type 2 diabetes mellitus without complication, without long-term current use of insulin  Comments:  Metformin 1000 mg twice a day.    Testicular pain, left  Comments:  No significant findings.  Check ultrasound for any potential cyst/varicocele.  Patient is mostly concerned about cancer.  Orders:  -     Urinalysis; Future; Expected date: 12/14/2023  -     US Scrotum And Testicles; Future; Expected date: 12/21/2023    Blood pressures are doing okay.      He has lost couple of lb of weight and his watching his diet.      I need to check a A1c level now to see the control of his diabetes.      He has a new onset of testicular pain and discomfort.  No history of trauma.  He has not sexually active.  No penile discharge or lesions.  He does use public restrooms and sometimes the hygiene or there might not be the best.    He does have some back pain but we are not sure at this point whether his back causes any referred pain in the testicular region through the pudendal nerve.  He does not have any symptoms of sciatica at this point.    He needs to do his labs including A1c and basic metabolic panel.  I will check a urinalysis and testicular ultrasound and notify him of the results.    Continue to watch diet and walking and exercise.  Continue with the COVID, shingles and RSV precautions.    Consider RSV vaccine.      If no major changes I will like to see him back in 4 months to 6 months time.  If his testicular pain continues then I will refer him to a urologist also.    Advised Mr. Hernandez to monitor Blood sugars at home and record them.  Exercise, watch diet and loose weight.  keep a close eye on feet and keep them clean. Annual eye examination. Annual influenza vaccine.  Monitor HgbA1c every 3 to 6 months. Monitor urine microalbumin every year.keep LDL less than 100. Monitor blood pressure and target blood pressure  120/70.    Follow-up 4 months  Spent cheli 30 minutes with patient which involved review of pts medical conditions, labs, medications and with 50% of time face-to-face discussion about medical problems, management and any applicable changes.      Current Outpatient Medications:     aspirin (ECOTRIN) 81 MG EC tablet, Take 1 tablet (81 mg total) by mouth once daily., Disp: 100 tablet, Rfl: 4    fenofibrate 160 MG Tab, Take 1 tablet (160 mg total) by mouth every evening., Disp: 90 tablet, Rfl: 2    losartan (COZAAR) 50 MG tablet, Take 1 tablet by mouth once daily, Disp: 30 tablet, Rfl: 8    metFORMIN (GLUCOPHAGE) 1000 MG tablet, TAKE 1 TABLET BY MOUTH TWICE DAILY WITH MEALS, Disp: 180 tablet, Rfl: 1    paroxetine (PAXIL-CR) 25 MG 24 hr tablet, Take 1 tablet by mouth once daily, Disp: 90 tablet, Rfl: 1    rosuvastatin (CRESTOR) 20 MG tablet, Take 20 mg by mouth once daily., Disp: , Rfl:

## 2023-12-15 ENCOUNTER — LAB VISIT (OUTPATIENT)
Dept: LAB | Facility: HOSPITAL | Age: 68
End: 2023-12-15
Attending: INTERNAL MEDICINE
Payer: MEDICARE

## 2023-12-15 DIAGNOSIS — N50.812 TESTICULAR PAIN, LEFT: Chronic | ICD-10-CM

## 2023-12-15 DIAGNOSIS — E78.00 HYPERCHOLESTEREMIA: ICD-10-CM

## 2023-12-15 DIAGNOSIS — E11.9 TYPE 2 DIABETES MELLITUS WITHOUT COMPLICATION, WITHOUT LONG-TERM CURRENT USE OF INSULIN: ICD-10-CM

## 2023-12-15 LAB
ANION GAP SERPL CALC-SCNC: 6 MMOL/L (ref 8–16)
BILIRUB UR QL STRIP: NEGATIVE
BUN SERPL-MCNC: 15 MG/DL (ref 8–23)
CALCIUM SERPL-MCNC: 9.1 MG/DL (ref 8.7–10.5)
CHLORIDE SERPL-SCNC: 109 MMOL/L (ref 95–110)
CLARITY UR: CLEAR
CO2 SERPL-SCNC: 25 MMOL/L (ref 23–29)
COLOR UR: YELLOW
CREAT SERPL-MCNC: 0.8 MG/DL (ref 0.5–1.4)
EST. GFR  (NO RACE VARIABLE): >60 ML/MIN/1.73 M^2
ESTIMATED AVG GLUCOSE: 131 MG/DL (ref 68–131)
GLUCOSE SERPL-MCNC: 139 MG/DL (ref 70–110)
GLUCOSE UR QL STRIP: NEGATIVE
HBA1C MFR BLD: 6.2 % (ref 4.5–6.2)
HGB UR QL STRIP: NEGATIVE
KETONES UR QL STRIP: NEGATIVE
LEUKOCYTE ESTERASE UR QL STRIP: NEGATIVE
NITRITE UR QL STRIP: NEGATIVE
PH UR STRIP: 5 [PH] (ref 5–8)
POTASSIUM SERPL-SCNC: 4.9 MMOL/L (ref 3.5–5.1)
PROT UR QL STRIP: NEGATIVE
SODIUM SERPL-SCNC: 140 MMOL/L (ref 136–145)
SP GR UR STRIP: 1.02 (ref 1–1.03)
URN SPEC COLLECT METH UR: NORMAL
UROBILINOGEN UR STRIP-ACNC: NEGATIVE EU/DL

## 2023-12-15 PROCEDURE — 83036 HEMOGLOBIN GLYCOSYLATED A1C: CPT | Performed by: INTERNAL MEDICINE

## 2023-12-15 PROCEDURE — 36415 COLL VENOUS BLD VENIPUNCTURE: CPT | Performed by: INTERNAL MEDICINE

## 2023-12-15 PROCEDURE — 80048 BASIC METABOLIC PNL TOTAL CA: CPT | Performed by: INTERNAL MEDICINE

## 2023-12-15 PROCEDURE — 81003 URINALYSIS AUTO W/O SCOPE: CPT | Performed by: INTERNAL MEDICINE

## 2023-12-15 NOTE — PROGRESS NOTES
Patient is not on the portal.  Please notify him that his blood glucose is 139 for fasting level.  It has not that high given his diabetes.  Kidney tests are normal.  Urine test is also normal.  A1c test which is for diabetes is excellent at 6.2.  Previously it was 7.1.  It should be less than 6.5.  Overall his labs look good and he should keep his regular follow-up otherwise.

## 2023-12-18 ENCOUNTER — TELEPHONE (OUTPATIENT)
Dept: FAMILY MEDICINE | Facility: CLINIC | Age: 68
End: 2023-12-18
Payer: MEDICARE

## 2023-12-18 NOTE — TELEPHONE ENCOUNTER
----- Message from Gregorio Grijalva MD sent at 12/15/2023  2:43 PM CST -----  Patient is not on the portal.  Please notify him that his blood glucose is 139 for fasting level.  It has not that high given his diabetes.  Kidney tests are normal.  Urine test is also normal.  A1c test which is for diabetes is excellent at 6.2.  Previously it was 7.1.  It should be less than 6.5.  Overall his labs look good and he should keep his regular follow-up otherwise.

## 2023-12-20 ENCOUNTER — TELEPHONE (OUTPATIENT)
Dept: FAMILY MEDICINE | Facility: CLINIC | Age: 68
End: 2023-12-20
Payer: MEDICARE

## 2023-12-21 ENCOUNTER — HOSPITAL ENCOUNTER (OUTPATIENT)
Dept: RADIOLOGY | Facility: HOSPITAL | Age: 68
Discharge: HOME | End: 2023-12-21
Attending: INTERNAL MEDICINE
Payer: MEDICARE

## 2023-12-21 DIAGNOSIS — N50.812 TESTICULAR PAIN, LEFT: Chronic | ICD-10-CM

## 2023-12-21 PROCEDURE — 76870 US EXAM SCROTUM: CPT | Mod: TC,PO

## 2023-12-22 NOTE — PROGRESS NOTES
Please notify patient that his testicular ultrasound is within range.  No sign of cancer.  There is some fluid collection and blood vessel engorgement or the veins are engorged keeping him discomfort.  Depending upon as to how he is, we can consider urology referral but no worries about cancer.

## 2023-12-27 ENCOUNTER — TELEPHONE (OUTPATIENT)
Dept: FAMILY MEDICINE | Facility: CLINIC | Age: 68
End: 2023-12-27
Payer: MEDICARE

## 2023-12-27 NOTE — TELEPHONE ENCOUNTER
----- Message from Gregorio Grijalva MD sent at 12/22/2023  9:18 AM CST -----  Please notify patient that his testicular ultrasound is within range.  No sign of cancer.  There is some fluid collection and blood vessel engorgement or the veins are engorged keeping him discomfort.  Depending upon as to how he is, we can consider urology referral but no worries about cancer.

## 2023-12-28 ENCOUNTER — TELEPHONE (OUTPATIENT)
Dept: FAMILY MEDICINE | Facility: CLINIC | Age: 68
End: 2023-12-28
Payer: MEDICARE

## 2024-01-04 NOTE — PATIENT INSTRUCTIONS
Your Diabetes Foot Care Program    Every day you depend on your feet to keep you moving. But when you have diabetes, your feet need special care. Even a small foot problem can become very serious. So dont take your feet for granted. By working with your diabetes healthcare team, you can learn how to protect your feet and keep them healthy.  Evaluating your feet  An evaluation helps your healthcare provider check the condition of your feet. The evaluation includes a review of your diabetes history and overall health. It may also include a foot exam, X-rays, or other tests. These can help show problems beneath the skin that you cant see or feel.  Medical history  You will be asked about your overall health and any history of foot problems. Youll also discuss your diabetes history, such as whether your blood sugar level has changed over time. It also includes questions about sensations of pain, tingling, pins and needles, or numbness. Your healthcare provider will also want to know if you have high blood pressure and heart disease, or if you smoke. Be sure to mention any medicines (including over-the-counter), supplements, or herbal remedies you take.  Foot exam  A foot exam checks the condition of different parts of your foot. First, your skin and nails are examined for any signs of infection. Blood flow is checked by feeling for the pulses in each foot. You may also have tests to study the nerves in the foot. These include using a small filament (wire) to see how sensitive your feet are. In certain cases, you will be asked to walk a short distance to check for bone, joint, and muscle problems.  Diagnostic tests  If needed, your healthcare provider will suggest certain tests to learn more about your feet. These include:  Doppler tests to measure blood flow in the feet and lower leg.  X-rays, which can show bone or joint problems.  Other imaging tests, such as an MRI (magnetic resonance imaging), bone scan, and CT  (computed tomography) scan. These can help show bone infections.  Other tests, such as vascular tests, which study the blood flow in your feet and legs. You may also have nerve studies to learn how sensitive your feet are.  Creating a foot care program  Based on the evaluation, your healthcare provider will create a foot care program for you. Your program may be as simple as starting a daily self-care routine and changing the types of shoes your wear. It may also involve treating minor foot problems, such as a corn or blister. In some cases, surgery will be needed to treat an infection or mechanical problems, such as hammer toes.  Preventing problems  When you have diabetes, its easier to prevent problems than to treat them later on. So see your healthcare team for regular checkups and foot care. Your healthcare team can also help you learn more about caring for your feet at home. For example, you may be told to avoid walking barefoot. Or you may be told that special footwear is needed to protect your feet.  Have regular checkups  Foot problems can develop quickly. So be sure to follow your healthcare teams schedule for regular checkups. During office visits, take off your shoes and socks as soon as you get in the exam room. Ask your healthcare provider to examine your feet for problems. This will make it easier to find and treat small skin irritations before they get worse. Regular checkups can also help keep track of the blood flow and feeling in your feet. If you have neuropathy (lack of feeling in your feet), you will need to have checkups more often.  Learn about self-care  The more you know about diabetes and your feet, the easier it will be to prevent problems. Members of your healthcare team can teach you how to inspect your feet and teach you to look for warning signs. They can also give you other foot care tips. During office visits, be sure to ask any questions you have.  Date Last Reviewed: 7/1/2016  ©  7670-2646 [employer]. [address] [place] [address], [place] [address]. All rights reserved. This information is not intended as a substitute for professional medical care. Always follow your healthcare professional's instructions.

## 2024-01-17 ENCOUNTER — OFFICE VISIT (OUTPATIENT)
Dept: PODIATRY | Facility: CLINIC | Age: 69
End: 2024-01-17
Payer: MEDICARE

## 2024-01-17 VITALS — BODY MASS INDEX: 28.09 KG/M2 | WEIGHT: 179 LBS | HEIGHT: 67 IN

## 2024-01-17 DIAGNOSIS — M20.41 HAMMER TOE OF RIGHT FOOT: ICD-10-CM

## 2024-01-17 DIAGNOSIS — L85.1 ACQUIRED KERATODERMA: ICD-10-CM

## 2024-01-17 DIAGNOSIS — E11.9 TYPE 2 DIABETES MELLITUS WITHOUT COMPLICATION, WITHOUT LONG-TERM CURRENT USE OF INSULIN: Primary | ICD-10-CM

## 2024-01-17 DIAGNOSIS — E11.9 ENCOUNTER FOR DIABETIC FOOT EXAM: ICD-10-CM

## 2024-01-17 PROCEDURE — 1125F AMNT PAIN NOTED PAIN PRSNT: CPT | Mod: CPTII,S$GLB,, | Performed by: PODIATRIST

## 2024-01-17 PROCEDURE — 1159F MED LIST DOCD IN RCRD: CPT | Mod: CPTII,S$GLB,, | Performed by: PODIATRIST

## 2024-01-17 PROCEDURE — 99213 OFFICE O/P EST LOW 20 MIN: CPT | Mod: S$GLB,,, | Performed by: PODIATRIST

## 2024-01-17 PROCEDURE — 3288F FALL RISK ASSESSMENT DOCD: CPT | Mod: CPTII,S$GLB,, | Performed by: PODIATRIST

## 2024-01-17 PROCEDURE — 99999 PR PBB SHADOW E&M-EST. PATIENT-LVL III: CPT | Mod: PBBFAC,,, | Performed by: PODIATRIST

## 2024-01-17 PROCEDURE — 1160F RVW MEDS BY RX/DR IN RCRD: CPT | Mod: CPTII,S$GLB,, | Performed by: PODIATRIST

## 2024-01-17 PROCEDURE — 3008F BODY MASS INDEX DOCD: CPT | Mod: CPTII,S$GLB,, | Performed by: PODIATRIST

## 2024-01-17 PROCEDURE — 1101F PT FALLS ASSESS-DOCD LE1/YR: CPT | Mod: CPTII,S$GLB,, | Performed by: PODIATRIST

## 2024-01-17 NOTE — PROGRESS NOTES
"    [address] [facility] [address] [place] [address]  Phone: [phone]   [phone]    Patient's PCP:Gregorio Grijalva MD  Referring Provider: No ref. provider found    Subjective:      Chief Complaint:: Diabetic Foot Exam and Diabetes Mellitus    HPI  Yan Hernandez Jr. is a 68 y.o. male who presents today for a diabetic foot exam.  Pt has seen  on 12/14/23 who treats them for their diabetes.  Pt has been a diabetic since 2007.  Taking metformin to treat diabetes.      Blood sugar: 115  Hemoglobin A1C: 6.2        Vitals:    01/17/24 1531   Weight: 81.2 kg (179 lb 0.2 oz)   Height: 5' 7" (1.702 m)   PainSc:   8      Shoe Size: 10    History reviewed. No pertinent surgical history.  Past Medical History:   Diagnosis Date    Acute bronchitis 6/1/2017    Allergy     atorvastatin, Influenza vaccine    Diabetes mellitus     Diabetes mellitus, type 2     Eosinophilic esophagitis 2/18/2020    Based upon endoscopy done by Dr. Clark Sprague.    GERD (gastroesophageal reflux disease)     History of endoscopy 4/6/2021    Dr. Clark Sprague.  Esophagus appears to have mucosal like eosinophilic esophagitis.  Pathology did not indicate any evidence of malignancy.    Hx of rhinoplasty 10/17/2018    Hyperlipidemia     Hypertension      Family History   Problem Relation Age of Onset    Arthritis Mother     Alzheimer's disease Mother     Cancer Father         Social History:   Marital Status: Single  Alcohol History:  reports no history of alcohol use.  Tobacco History:  reports that he quit smoking about 49 years ago. His smoking use included cigarettes. He has never used smokeless tobacco.  Drug History:  reports no history of drug use.    Review of patient's allergies indicates:   Allergen Reactions    Lisinopril Other (See Comments)     Causes dizziness.    Influenza virus vaccines      Gets sick with shots       Current Outpatient Medications   Medication Sig Dispense Refill    aspirin (ECOTRIN) 81 MG EC tablet " Take 1 tablet (81 mg total) by mouth once daily. 100 tablet 4    fenofibrate 160 MG Tab Take 1 tablet (160 mg total) by mouth every evening. 90 tablet 2    losartan (COZAAR) 50 MG tablet Take 1 tablet by mouth once daily 30 tablet 8    metFORMIN (GLUCOPHAGE) 1000 MG tablet TAKE 1 TABLET BY MOUTH TWICE DAILY WITH MEALS 180 tablet 1    paroxetine (PAXIL-CR) 25 MG 24 hr tablet Take 1 tablet by mouth once daily 90 tablet 1    rosuvastatin (CRESTOR) 20 MG tablet Take 20 mg by mouth once daily.       No current facility-administered medications for this visit.       Review of Systems   Constitutional:  Negative for chills, fatigue, fever and unexpected weight change.   HENT:  Negative for hearing loss and trouble swallowing.    Eyes:  Negative for photophobia and visual disturbance.   Respiratory:  Negative for cough, shortness of breath and wheezing.    Cardiovascular:  Negative for chest pain, palpitations and leg swelling.   Gastrointestinal:  Negative for abdominal pain and nausea.   Genitourinary:  Negative for dysuria and frequency.   Musculoskeletal:  Negative for arthralgias, back pain, gait problem, joint swelling and myalgias.   Skin:  Negative for rash and wound.   Neurological:  Negative for tremors, seizures, weakness, numbness and headaches.   Hematological:  Does not bruise/bleed easily.         Objective:        Physical Exam:   Foot Exam    General  General Appearance: appears stated age and healthy   Orientation: alert and oriented to person, place, and time   Affect: appropriate   Gait: unimpaired       Right Foot/Ankle     Inspection and Palpation  Ecchymosis: none  Tenderness: none   Swelling: none   Arch: normal  Hammertoes: second toe, third toe, fourth toe and fifth toe  Claw Toes: absent  Hallux valgus: yes (Mild deformity)  Hallux limitus: yes (Mild deformity)  Skin Exam: callus;   Neurovascular  Dorsalis pedis: 2+  Posterior tibial: 2+  Capillary Refill: 2+  Saphenous nerve sensation:  normal  Tibial nerve sensation: normal  Superficial peroneal nerve sensation: normal  Deep peroneal nerve sensation: normal  Sural nerve sensation: normal    Muscle Strength  Ankle dorsiflexion: 5  Ankle plantar flexion: 5  Ankle inversion: 5  Ankle eversion: 5  Great toe extension: 5  Great toe flexion: 5    Range of Motion    Normal right ankle ROM  Passive  1st MTP extension: 30    Active  1st MTP extension: 30      Left Foot/Ankle      Inspection and Palpation  Ecchymosis: none  Tenderness: none   Swelling: none   Arch: normal  Hammertoes: second toe, third toe, fourth toe and fifth toe  Claw toes: absent  Hallux valgus: yes (Mild deformity)  Hallux limitus: yes (Mild deformity)  Skin Exam: callus;   Neurovascular  Dorsalis pedis: 2+  Posterior tibial: 2+  Capillary refill: 2+  Saphenous nerve sensation: normal  Tibial nerve sensation: normal  Superficial peroneal nerve sensation: normal  Deep peroneal nerve sensation: normal  Sural nerve sensation: normal    Muscle Strength  Ankle dorsiflexion: 5  Ankle plantar flexion: 5  Ankle inversion: 5  Ankle eversion: 5  Great toe extension: 5  Great toe flexion: 5    Range of Motion    Normal left ankle ROM  Passive  1st MTP extension: 30    Active  1st MTP extension: 30        Physical Exam  Cardiovascular:      Pulses:           Dorsalis pedis pulses are 2+ on the right side and 2+ on the left side.        Posterior tibial pulses are 2+ on the right side and 2+ on the left side.   Musculoskeletal:      Right foot: Bunion (Mild deformity) present.      Left foot: Bunion (Mild deformity) present.   Feet:      Right foot:      Skin integrity: Callus present.      Left foot:      Skin integrity: Callus present.         Imaging: none            Assessment:       1. Type 2 diabetes mellitus without complication, without long-term current use of insulin    2. Encounter for diabetic foot exam    3. Hammer toe of right foot    4. Acquired keratoderma      Plan:   Type 2  diabetes mellitus without complication, without long-term current use of insulin  -      DIABETES FOOT EXAM    Encounter for diabetic foot exam  -      DIABETES FOOT EXAM    Hammer toe of right foot    Acquired keratoderma      Follow up in about 1 year (around 1/17/2025), or if symptoms worsen or fail to improve.    Procedures        Counseled patient on the aspects of diabetes and how it pertains to the feet.  I explained the importance of proper diabetic foot care and how it is essential for the health of their feet.      Shoe inspection. Patient instructed on proper foot hygeine. We discussed wearing proper shoe gear, daily foot inspections, never walking without protective shoe gear, never putting sharp instruments to feet.    Recommend pumice stone and urea cream 40% for management of his bilateral calluses.      Counseling:     I provided patient education verbally regarding:   Patient diagnosis, treatment options, as well as alternatives, risks, and benefits.     This note was created using Dragon voice recognition software that occasionally misinterpreted phrases or words.

## 2024-01-22 ENCOUNTER — TELEPHONE (OUTPATIENT)
Dept: FAMILY MEDICINE | Facility: CLINIC | Age: 69
End: 2024-01-22
Payer: MEDICARE

## 2024-01-22 NOTE — TELEPHONE ENCOUNTER
----- Message from Gregorio Grijalva MD sent at 1/21/2024  2:08 PM CST -----  Patient's ultrasound had shown a cyst which is benign in nature on the right side and some vein enlargement which could be causing him discomfort.  Is doing okay at this point and if not, I will make a referral to Urology also.

## 2024-01-23 ENCOUNTER — TELEPHONE (OUTPATIENT)
Dept: FAMILY MEDICINE | Facility: CLINIC | Age: 69
End: 2024-01-23
Payer: MEDICARE

## 2024-05-08 DIAGNOSIS — F41.1 ANXIETY, GENERALIZED: ICD-10-CM

## 2024-05-09 RX ORDER — PAROXETINE HYDROCHLORIDE HEMIHYDRATE 25 MG/1
TABLET, FILM COATED, EXTENDED RELEASE ORAL
Qty: 90 TABLET | Refills: 0 | Status: SHIPPED | OUTPATIENT
Start: 2024-05-09

## 2024-07-31 DIAGNOSIS — F41.1 ANXIETY, GENERALIZED: ICD-10-CM

## 2024-08-01 RX ORDER — PAROXETINE HYDROCHLORIDE HEMIHYDRATE 25 MG/1
TABLET, FILM COATED, EXTENDED RELEASE ORAL
Qty: 60 TABLET | Refills: 0 | Status: SHIPPED | OUTPATIENT
Start: 2024-08-01 | End: 2024-09-30

## 2024-09-18 ENCOUNTER — OFFICE VISIT (OUTPATIENT)
Dept: PODIATRY | Facility: CLINIC | Age: 69
End: 2024-09-18
Payer: MEDICARE

## 2024-09-18 VITALS — RESPIRATION RATE: 16 BRPM | HEIGHT: 67 IN | BODY MASS INDEX: 27.2 KG/M2 | WEIGHT: 173.31 LBS | HEART RATE: 68 BPM

## 2024-09-18 DIAGNOSIS — M20.41 HAMMER TOE OF RIGHT FOOT: ICD-10-CM

## 2024-09-18 DIAGNOSIS — E11.9 TYPE 2 DIABETES MELLITUS WITHOUT COMPLICATION, WITHOUT LONG-TERM CURRENT USE OF INSULIN: Primary | ICD-10-CM

## 2024-09-18 DIAGNOSIS — M21.619 BUNION: ICD-10-CM

## 2024-09-18 DIAGNOSIS — L85.1 ACQUIRED KERATODERMA: ICD-10-CM

## 2024-09-18 PROCEDURE — 99213 OFFICE O/P EST LOW 20 MIN: CPT | Mod: HCNC,S$GLB,, | Performed by: PODIATRIST

## 2024-09-18 PROCEDURE — 3288F FALL RISK ASSESSMENT DOCD: CPT | Mod: HCNC,CPTII,S$GLB, | Performed by: PODIATRIST

## 2024-09-18 PROCEDURE — 1160F RVW MEDS BY RX/DR IN RCRD: CPT | Mod: HCNC,CPTII,S$GLB, | Performed by: PODIATRIST

## 2024-09-18 PROCEDURE — 1159F MED LIST DOCD IN RCRD: CPT | Mod: HCNC,CPTII,S$GLB, | Performed by: PODIATRIST

## 2024-09-18 PROCEDURE — 4010F ACE/ARB THERAPY RXD/TAKEN: CPT | Mod: HCNC,CPTII,S$GLB, | Performed by: PODIATRIST

## 2024-09-18 PROCEDURE — 1125F AMNT PAIN NOTED PAIN PRSNT: CPT | Mod: HCNC,CPTII,S$GLB, | Performed by: PODIATRIST

## 2024-09-18 PROCEDURE — 1101F PT FALLS ASSESS-DOCD LE1/YR: CPT | Mod: HCNC,CPTII,S$GLB, | Performed by: PODIATRIST

## 2024-09-18 PROCEDURE — 3008F BODY MASS INDEX DOCD: CPT | Mod: HCNC,CPTII,S$GLB, | Performed by: PODIATRIST

## 2024-09-18 PROCEDURE — 99999 PR PBB SHADOW E&M-EST. PATIENT-LVL III: CPT | Mod: PBBFAC,HCNC,, | Performed by: PODIATRIST

## 2024-09-18 NOTE — PROGRESS NOTES
"  [address] [facility] [address]  [place] [address]  Phone: [phone]   [phone]    Patient's PCP:Gregorio Grijalva MD  Referring Provider: No ref. provider found    Subjective:      Chief Complaint:: Callouses (Right second toe corn/calluses ) and Nail Problem (Bilateral fungal nails possible ingrown great toes)    HPI  Yan Hernandez Jr. is a 69 y.o. male who presents today with a complaint of bilateral fungal nails and possible ingrown. The current episode started sometime ago.  The symptoms include pain, hard skin formation with thick nail at an angle . Probable cause of complaint unknown.  The symptoms are aggravated by shoe gear. The problem has worsened. Treatment to date have included previous trimming which provided some relief.     Systemic Doctor: Gregorio Grijalva MD  Date Last Seen: 12/14/2023  Blood Sugar: 137  Hemoglobin A1c: 6.2    Vitals:    09/18/24 1506   Pulse: 68   Resp: 16   Weight: 78.6 kg (173 lb 4.5 oz)   Height: 5' 7" (1.702 m)   PainSc:   6      Shoe Size:     History reviewed. No pertinent surgical history.  Past Medical History:   Diagnosis Date    Acute bronchitis 6/1/2017    Allergy     atorvastatin, Influenza vaccine    Diabetes mellitus     Diabetes mellitus, type 2     Eosinophilic esophagitis 2/18/2020    Based upon endoscopy done by Dr. Clark Sprague.    GERD (gastroesophageal reflux disease)     History of endoscopy 4/6/2021    Dr. Clark Sprague.  Esophagus appears to have mucosal like eosinophilic esophagitis.  Pathology did not indicate any evidence of malignancy.    Hx of rhinoplasty 10/17/2018    Hyperlipidemia     Hypertension      Family History   Problem Relation Name Age of Onset    Arthritis Mother      Alzheimer's disease Mother      Cancer Father          Social History:   Marital Status: Single  Alcohol History:  reports no history of alcohol use.  Tobacco History:  reports that he quit smoking about 49 years ago. His smoking use included cigarettes. He has " never used smokeless tobacco.  Drug History:  reports no history of drug use.    Review of patient's allergies indicates:   Allergen Reactions    Lisinopril Other (See Comments)     Causes dizziness.    Influenza virus vaccines      Gets sick with shots       Current Outpatient Medications   Medication Sig Dispense Refill    fenofibrate 160 MG Tab Take 1 tablet (160 mg total) by mouth every evening. 90 tablet 2    losartan (COZAAR) 50 MG tablet Take 1 tablet by mouth once daily 30 tablet 8    metFORMIN (GLUCOPHAGE) 1000 MG tablet TAKE 1 TABLET BY MOUTH TWICE DAILY WITH MEALS 180 tablet 1    paroxetine (PAXIL-CR) 25 MG 24 hr tablet Take 1 tablet by mouth once daily 60 tablet 0    rosuvastatin (CRESTOR) 20 MG tablet Take 20 mg by mouth once daily.      aspirin (ECOTRIN) 81 MG EC tablet Take 1 tablet (81 mg total) by mouth once daily. (Patient not taking: Reported on 9/18/2024) 100 tablet 4     No current facility-administered medications for this visit.       Review of Systems   Constitutional:  Negative for chills, fatigue, fever and unexpected weight change.   HENT:  Negative for hearing loss and trouble swallowing.    Eyes:  Negative for photophobia and visual disturbance.   Respiratory:  Negative for cough, shortness of breath and wheezing.    Cardiovascular:  Negative for chest pain, palpitations and leg swelling.   Gastrointestinal:  Negative for abdominal pain and nausea.   Genitourinary:  Negative for dysuria and frequency.   Musculoskeletal:  Negative for arthralgias, back pain, gait problem, joint swelling and myalgias.   Skin:  Negative for rash and wound.   Neurological:  Negative for tremors, seizures, weakness, numbness and headaches.   Hematological:  Does not bruise/bleed easily.         Objective:        Physical Exam:   Foot Exam    General  General Appearance: appears stated age and healthy   Orientation: alert and oriented to person, place, and time   Affect: appropriate   Gait: unimpaired        Right Foot/Ankle     Inspection and Palpation  Ecchymosis: none  Tenderness: none   Swelling: none   Arch: normal  Hammertoes: second toe, third toe, fourth toe and fifth toe  Claw Toes: absent  Hallux valgus: yes (Mild deformity)  Hallux limitus: yes (Mild deformity)  Skin Exam: callus;   Fungus Toenails: present    Neurovascular  Dorsalis pedis: 2+  Posterior tibial: 2+  Capillary Refill: 2+  Saphenous nerve sensation: normal  Tibial nerve sensation: normal  Superficial peroneal nerve sensation: normal  Deep peroneal nerve sensation: normal  Sural nerve sensation: normal    Muscle Strength  Ankle dorsiflexion: 5  Ankle plantar flexion: 5  Ankle inversion: 5  Ankle eversion: 5  Great toe extension: 5  Great toe flexion: 5    Range of Motion    Normal right ankle ROM  Passive  1st MTP extension: 30    Active  1st MTP extension: 30      Left Foot/Ankle      Inspection and Palpation  Ecchymosis: none  Tenderness: none   Swelling: none   Arch: normal  Hammertoes: second toe, third toe, fourth toe and fifth toe  Claw toes: absent  Hallux valgus: yes (Mild deformity)  Hallux limitus: yes (Mild deformity)  Skin Exam: callus;   Fungus Toenails: present    Neurovascular  Dorsalis pedis: 2+  Posterior tibial: 2+  Capillary refill: 2+  Saphenous nerve sensation: normal  Tibial nerve sensation: normal  Superficial peroneal nerve sensation: normal  Deep peroneal nerve sensation: normal  Sural nerve sensation: normal    Muscle Strength  Ankle dorsiflexion: 5  Ankle plantar flexion: 5  Ankle inversion: 5  Ankle eversion: 5  Great toe extension: 5  Great toe flexion: 5    Range of Motion    Normal left ankle ROM  Passive  1st MTP extension: 30    Active  1st MTP extension: 30        Physical Exam  Cardiovascular:      Pulses:           Dorsalis pedis pulses are 2+ on the right side and 2+ on the left side.        Posterior tibial pulses are 2+ on the right side and 2+ on the left side.   Musculoskeletal:      Right foot:  Bunion (Mild deformity) present.      Left foot: Bunion (Mild deformity) present.   Feet:      Right foot:      Skin integrity: Callus present.      Toenail Condition: Fungal disease present.     Left foot:      Skin integrity: Callus present.      Toenail Condition: Fungal disease present.              Right Ankle/Foot Exam     Range of Motion   The patient has normal right ankle ROM.    Left Ankle/Foot Exam     Range of Motion   The patient has normal left ankle ROM.       Muscle Strength   Right Lower Extremity   Ankle Dorsiflexion:  5   Plantar flexion:  5/5  Left Lower Extremity   Ankle Dorsiflexion:  5   Plantar flexion:  5/5     Vascular Exam     Right Pulses  Dorsalis Pedis:      2+  Posterior Tibial:      2+        Left Pulses  Dorsalis Pedis:      2+  Posterior Tibial:      2+           Imaging:            Assessment:       1. Type 2 diabetes mellitus without complication, without long-term current use of insulin    2. Hammer toe of right foot    3. Acquired keratoderma    4. Bunion      Plan:   Type 2 diabetes mellitus without complication, without long-term current use of insulin    Hammer toe of right foot    Acquired keratoderma    Bunion      Follow up if symptoms worsen or fail to improve.    Procedures        I trimmed the calluses as a courtesy.  Patient tolerated well.  I explained that these formed due to areas of increased pressure.  We discussed different ways of padding and offloading the toes.  Also recommend urea cream 40% for continued management and softening of the calluses.      Counseling:     I provided patient education verbally regarding:   Patient diagnosis, treatment options, as well as alternatives, risks, and benefits.     This note was created using Dragon voice recognition software that occasionally misinterpreted phrases or words.

## 2024-10-05 NOTE — PROGRESS NOTES
Subjective:       Patient ID: Yan Hernandez Jr. is a 69 y.o. male.    Chief Complaint: Diabetes, Hypertension, Hyperlipidemia, Gastroesophageal Reflux, and Anxiety    Patient is a 69-year-old gentleman who comes for  follow-up  almost after 10 months now  instead of 4 months which he was supposed to do.      Last visit is left testicle was heard it which I assume was minor inflammation or referred pain and to treated conservatively.  Should this persist, he was advised to follow-up earlier.      Last set of labs were done in 2023 and after that we do not have any labs.    Underlying medical issues are as below:-    1. Essential hypertension   -Losartan 50 mg  2. Type 2  DM without complication, without long-term current use of insulin   3. Hypercholesteremia  - fenofibrate/rosuvastatin 20  4. Gastroesophageal reflux disease without esophagitis  - no meds  5. Anxiety, generalized  -paroxetine CR 25  6. Positive MEDARDO (antinuclear antibody)      For several years he had some mood disorder and anxiety which eventually over a period of time started settling down as he mellowed with passage of time and it did not let us the usual variations and rigmaroles of of life              Hypertension  This is a chronic problem. The current episode started more than 1 year ago. The problem is controlled. Associated symptoms include anxiety. Pertinent negatives include no palpitations. Risk factors for coronary artery disease include male gender, diabetes mellitus and obesity. Past treatments include angiotensin blockers. The current treatment provides moderate improvement. Compliance problems include psychosocial issues.  There is no history of chronic renal disease, hyperaldosteronism or renovascular disease.   Diabetes  He presents for his follow-up diabetic visit. He has type 2 diabetes mellitus. The initial diagnosis of diabetes was made 10 years ago. His disease course has been stable. Hypoglycemia symptoms include  nervousness/anxiousness. Pertinent negatives for hypoglycemia include no confusion or pallor. Pertinent negatives for diabetes include no fatigue, no polydipsia, no polyphagia and no polyuria. There are no hypoglycemic complications. There are no diabetic complications. Risk factors for coronary artery disease include male sex, obesity, hypertension, dyslipidemia and diabetes mellitus. Current diabetic treatment includes oral agent (monotherapy) (Metformin 850 mg b.i.d.). He is compliant with treatment some of the time. Meal planning includes avoidance of concentrated sweets. He participates in exercise intermittently. An ACE inhibitor/angiotensin II receptor blocker is being taken. He does not see a podiatrist.  Hyperlipidemia  This is a chronic problem. The current episode started more than 1 year ago. The problem is controlled. He has no history of chronic renal disease or obesity. Current antihyperlipidemic treatment includes statins and fibric acid derivatives. Risk factors for coronary artery disease include hypertension, male sex, dyslipidemia and diabetes mellitus.   Anxiety  Presents for follow-up visit. Symptoms include nervous/anxious behavior. Patient reports no confusion, palpitations or suicidal ideas.           Past Medical History:   Diagnosis Date    Acute bronchitis 6/1/2017    Allergy     atorvastatin, Influenza vaccine    Diabetes mellitus     Diabetes mellitus, type 2     Eosinophilic esophagitis 2/18/2020    Based upon endoscopy done by Dr. Clark Sprague.    GERD (gastroesophageal reflux disease)     History of endoscopy 4/6/2021    Dr. Clark Sprague.  Esophagus appears to have mucosal like eosinophilic esophagitis.  Pathology did not indicate any evidence of malignancy.    Hx of rhinoplasty 10/17/2018    Hyperlipidemia     Hypertension      Social History     Socioeconomic History    Marital status: Single    Number of children: 0   Occupational History    Occupation: Independant  Distributor     Employer: [employer]   Tobacco Use    Smoking status: Former     Current packs/day: 0.00     Types: Cigarettes     Quit date: 1975     Years since quittin.8    Smokeless tobacco: Never   Substance and Sexual Activity    Alcohol use: No    Drug use: No    Sexual activity: Not Currently     Social Drivers of Health     Financial Resource Strain: Low Risk  (2022)    Overall Financial Resource Strain (CARDIA)     Difficulty of Paying Living Expenses: Not very hard   Food Insecurity: No Food Insecurity (2022)    Hunger Vital Sign     Worried About Running Out of Food in the Last Year: Never true     Ran Out of Food in the Last Year: Never true   Transportation Needs: No Transportation Needs (2022)    PRAPARE - Transportation     Lack of Transportation (Medical): No     Lack of Transportation (Non-Medical): No   Physical Activity: Inactive (2022)    Exercise Vital Sign     Days of Exercise per Week: 0 days     Minutes of Exercise per Session: 0 min   Stress: Stress Concern Present (2022)    [employer] - Occupational Stress Questionnaire     Feeling of Stress : To some extent   Housing Stability: Low Risk  (2022)    Housing Stability Vital Sign     Unable to Pay for Housing in the Last Year: No     Number of Places Lived in the Last Year: 1     Unstable Housing in the Last Year: No     History reviewed. No pertinent surgical history.  Family History   Problem Relation Name Age of Onset    Arthritis Mother      Alzheimer's disease Mother      Cancer Father         Review of Systems   Constitutional:  Negative for activity change, appetite change, fatigue and unexpected weight change (lost 2 lbs lost a few lb of weight).   HENT:  Negative for congestion, facial swelling and postnasal drip.    Eyes:  Negative for pain, discharge and visual disturbance.   Respiratory:  Negative for chest tightness.    Cardiovascular:  Negative for palpitations.  "       Hypertension   Gastrointestinal:  Negative for abdominal distention and anal bleeding.   Endocrine: Negative for cold intolerance, polydipsia, polyphagia and polyuria.        Type 2 diabetes, hyperlipidemia.   Skin:  Negative for pallor.   Psychiatric/Behavioral:  Negative for confusion and suicidal ideas. The patient is nervous/anxious.          Objective:      Blood pressure 134/80, pulse 91, height 5' 7" (1.702 m), weight 77.1 kg (170 lb). Body mass index is 26.63 kg/m².  Physical Exam  Vitals and nursing note reviewed.   Constitutional:       General: He is not in acute distress.     Appearance: He is well-developed. He is obese. He is not ill-appearing, toxic-appearing or diaphoretic.      Comments: BMI 26.63   HENT:      Head: Normocephalic and atraumatic.   Eyes:      General: No scleral icterus.  Neck:      Thyroid: No thyromegaly.      Vascular: No carotid bruit or JVD.      Trachea: No tracheal deviation.   Cardiovascular:      Rate and Rhythm: Normal rate and regular rhythm.      Heart sounds: Normal heart sounds. No murmur heard.     No friction rub. No gallop.   Pulmonary:      Effort: Pulmonary effort is normal.      Breath sounds: Normal breath sounds.   Abdominal:      General: There is no distension.      Palpations: Abdomen is soft. There is no mass.      Hernia: No hernia is present.   Genitourinary:     Penis: No phimosis, erythema, discharge, swelling or lesions.       Testes:         Right: Mass, tenderness or swelling not present.         Left: Mass, tenderness or swelling not present.      Epididymis:      Right: Not inflamed or enlarged. No mass.      Left: Not inflamed or enlarged. No mass.   Musculoskeletal:      Cervical back: Neck supple. No muscular tenderness.      Right lower leg: No edema.      Left lower leg: No edema.   Lymphadenopathy:      Cervical: No cervical adenopathy.   Skin:     General: Skin is warm and dry.      Findings: No lesion or rash.   Neurological:      " Mental Status: He is alert. Mental status is at baseline.      Gait: Gait and tandem walk normal.   Psychiatric:         Attention and Perception: Attention and perception normal.         Behavior: Behavior normal.      Comments: Note to be somewhat anhedonic and dysthymic in past though he consistently maintains an ebullient mood in person           Assessment:       No visits with results within 3 Month(s) from this visit.   Latest known visit with results is:   Lab Visit on 12/15/2023   Component Date Value Ref Range Status    Hemoglobin A1C 12/15/2023 6.2  4.5 - 6.2 % Final    Estimated Avg Glucose 12/15/2023 131  68 - 131 mg/dL Final    Sodium 12/15/2023 140  136 - 145 mmol/L Final    Potassium 12/15/2023 4.9  3.5 - 5.1 mmol/L Final    Chloride 12/15/2023 109  95 - 110 mmol/L Final    CO2 12/15/2023 25  23 - 29 mmol/L Final    Glucose 12/15/2023 139 (H)  70 - 110 mg/dL Final    BUN 12/15/2023 15  8 - 23 mg/dL Final    Creatinine 12/15/2023 0.8  0.5 - 1.4 mg/dL Final    Calcium 12/15/2023 9.1  8.7 - 10.5 mg/dL Final    Anion Gap 12/15/2023 6 (L)  8 - 16 mmol/L Final    eGFR 12/15/2023 >60.0  >60 mL/min/1.73 m^2 Final    Specimen UA 12/15/2023 Urine, Clean Catch   Final    Color, UA 12/15/2023 Yellow  Yellow, Straw, Nina Final    Appearance, UA 12/15/2023 Clear  Clear Final    pH, UA 12/15/2023 5.0  5.0 - 8.0 Final    Specific Gravity, UA 12/15/2023 1.020  1.005 - 1.030 Final    Protein, UA 12/15/2023 Negative  Negative Final    Glucose, UA 12/15/2023 Negative  Negative Final    Ketones, UA 12/15/2023 Negative  Negative Final    Bilirubin (UA) 12/15/2023 Negative  Negative Final    Occult Blood UA 12/15/2023 Negative  Negative Final    Nitrite, UA 12/15/2023 Negative  Negative Final    Urobilinogen, UA 12/15/2023 Negative  Negative EU/dL Final    Leukocytes, UA 12/15/2023 Negative  Negative Final   1 Result Note       2 Follow-up Encounters       1 HM Topic             Component Ref Range & Units 9 mo  ago  (12/15/23) 1 yr ago  (2/2/23) 2 yr ago  (3/3/22) 2 yr ago  (3/3/22) 3 yr ago  (6/23/21) 3 yr ago  (3/17/21) 3 yr ago  (10/15/20)   Hemoglobin A1C 4.5 - 6.2 % 6.2 7.1 High  CM 7.2 Abnormal  R SEE COMMENT CM 7.1 High  CM 7.4 High  CM 7.9 High         Test Reason : R07.9-electrocardiogram done on 01/16/2016    Vent. Rate : 080 BPM     Atrial Rate : 080 BPM     P-R Int : 150 ms          QRS Dur : 080 ms      QT Int : 354 ms       P-R-T Axes : 074 022 032 degrees     QTc Int : 408 ms    Normal sinus rhythm  Possible Left atrial enlargement  Borderline Abnormal ECG  No previous ECGs available  Confirmed by Thelma Laguerre MD ([id number]) on 1/18/2016 6:21:54 PM    Referred By: SELF REFERRAL           Confirmed By:Thelma Laguerre MD     Specimen Collected: 01/16/16 15:09 CST Last Resulted: 01/18/16 18:21 CST     1. Essential hypertension  Comments:  Losartan 50 mg.  Blood pressure generally okay and losartan was prescribed for diabetes in past  Orders:  -     Microalbumin/Creatinine Ratio, Urine; Future; Expected date: 10/14/2024  -     Comprehensive Metabolic Panel; Future; Expected date: 10/14/2024  -     Lipid Panel; Future; Expected date: 10/14/2024    2. Gastroesophageal reflux disease without esophagitis  Comments:  Reflux symptoms stable on no medication.    3. Anxiety, generalized  Comments:  He was taking Paxil 25 mg and seems to be stable with it.  While he was doing okay, his niece was diagnosed with breast cancer which makes him anxious.    4. Elevated blood sugar  Comments:  Check A1c.  Check microalbumin and lipid panel also.  Orders:  -     Hemoglobin A1C; Future; Expected date: 10/14/2024             Plan:   Essential hypertension  Comments:  Losartan 50 mg.  Blood pressure generally okay and losartan was prescribed for diabetes in past  Orders:  -     Microalbumin/Creatinine Ratio, Urine; Future; Expected date: 10/14/2024  -     Comprehensive Metabolic Panel; Future; Expected date: 10/14/2024  -     Lipid Panel;  Future; Expected date: 10/14/2024    Gastroesophageal reflux disease without esophagitis  Comments:  Reflux symptoms stable on no medication.    Anxiety, generalized  Comments:  He was taking Paxil 25 mg and seems to be stable with it.  While he was doing okay, his niece was diagnosed with breast cancer which makes him anxious.    Elevated blood sugar  Comments:  Check A1c.  Check microalbumin and lipid panel also.  Orders:  -     Hemoglobin A1C; Future; Expected date: 10/14/2024    Overall L1 is doing okay.      His blood pressures are doing okay.  His heart rate tends to be elevated when he comes to the office but subsequently seem to settle down.    Recently had upper respiratory tract infection and was probably given either codeine or pseudoephedrine and he was concerned that it may raise the blood pressures.  He did not take that medication.  He did get a steroid shot.      His last hemoglobin A1c was 6.2 and will check his A1c levels again.  I am going to remove the diagnosis of diabetes at this point.  He will continue to watch his diet.    I have recommended him to consider a flu shot and pneumonia vaccine also.  At this point he will think about it.      He does not smoke cigarettes.    Down the road please consider RSV and shingles vaccine.    He does live alone but he has a brother whom he keeps in touch with.    Follow up in about 4 months (around 2/7/2025), or if symptoms worsen or fail to improve, for Hypertension/lipids.      Current Outpatient Medications:     aspirin (ECOTRIN) 81 MG EC tablet, Take 1 tablet (81 mg total) by mouth once daily., Disp: 100 tablet, Rfl: 4    fenofibrate 160 MG Tab, Take 1 tablet (160 mg total) by mouth every evening., Disp: 90 tablet, Rfl: 2    losartan (COZAAR) 50 MG tablet, Take 1 tablet by mouth once daily, Disp: 30 tablet, Rfl: 8    metFORMIN (GLUCOPHAGE) 1000 MG tablet, TAKE 1 TABLET BY MOUTH TWICE DAILY WITH MEALS, Disp: 180 tablet, Rfl: 1    paroxetine (PAXIL-CR)  25 MG 24 hr tablet, Take 1 tablet by mouth once daily, Disp: 60 tablet, Rfl: 0    rosuvastatin (CRESTOR) 20 MG tablet, Take 20 mg by mouth once daily., Disp: , Rfl:     Gregorio Grijalva    Dear Patient,  Here are some important vaccines you should consider:-  1. **Flu Vaccine**:-    - **Why**: Protects you from the flu, which can be very serious.    - **When**: Every year, before the flu season starts.    - **Where**: Available in our office.  2. **Pneumonia Vaccine**:-   -      -**Why**: Protects you from pneumonia, which can cause severe lung infections.    - **When**: Usually given once, but sometimes a booster is needed.    - **Where**: Available in our office.  3. **Covid Vaccine Update**:    - **Why**: Keeps you protected against the latest Covid-19 variants.    - **When**: Follow the current guidelines for updates and boosters.    - **Where**: Available at your pharmacy under Medicare Part D or Advantage Plan. Check for any co-payments.  4. **RSV Vaccine**:-    - **Why**: Protects against respiratory syncytial virus, which can cause serious lung infections.    - **When**: Recommended for older adults.    - **Where**: Available at your pharmacy under Medicare Part D or Advantage Plan. Check for any co-payments.  5. **Shingles Vaccine**:    - **Why**: Prevents shingles, a painful skin rash.    - **When**: Usually given in two doses.    - **Where**: Available at your pharmacy under Medicare Part D or Advantage Plan. Check for any co-payments.  6. **TdAp Vaccine**:-    - **Why**: Protects against tetanus, diphtheria, and whooping cough.    - **When**: Every 10 years.    - **Where**: Available at your pharmacy under Medicare Part D or Advantage Plan. Check for any co-payments.      Summary of Recommendations for Geriatric Patients  Safety Issues:  Home Safety:  Regularly assess the home environment for fall hazards such as loose rugs, poor lighting, and clutter.  Install grab bars in bathrooms and ensure railings are  secure.  Driving Safety:  Encouraged regular vision and hearing tests.  Discuss driving capabilities regularly and consider alternatives if driving becomes unsafe.  Scam Awareness:  Educated patients on common scams targeting seniors, such as fraudulent phone calls and emails.  Advise them never to share personal information over the phone or online unless they can verify the source.  Emergency Preparedness:  Given the propensity for hurricanes and severe weather in our area, develop a personalized emergency plan.  Identify a safe place to go and ensure they have a supply of necessary medications, water, and non-perishable food.  Social Integration:  Encouraged participation in community activities and senior centers to maintain social connections.  Recommended joining clubs or groups that align with their interests to foster a sense of belonging and purpose.  Legal and Emergency Planning:  Living Will and Power of :  Advised patients to establish a living will to outline their healthcare preferences in situations where they may be unable to communicate.  Recommend appointing a trusted individual as a healthcare power of  to make medical decisions on their behalf if needed.  Alternate Contacts:  Ensure patients have an updated list of emergency contacts, including family members and friends who can be reached during emergencies.  Discuss and document who should be contacted in case of an emergency and ensure this information is easily accessible.  By addressing these areas, we aim to enhance the safety, well-being, and quality of life for our geriatric patients. Please feel free to reach out to our office for further assistance or clarification on any of these points.   For any questions or additional information, please contact:  Gregorio Grijalva MD  Internal Medicine  [address] [place] [address], [place], [address]  Phone: [phone]  Fax: [phone]

## 2024-10-07 ENCOUNTER — OFFICE VISIT (OUTPATIENT)
Dept: FAMILY MEDICINE | Facility: CLINIC | Age: 69
End: 2024-10-07
Payer: MEDICARE

## 2024-10-07 VITALS
SYSTOLIC BLOOD PRESSURE: 134 MMHG | HEART RATE: 91 BPM | HEIGHT: 67 IN | DIASTOLIC BLOOD PRESSURE: 80 MMHG | WEIGHT: 170 LBS | BODY MASS INDEX: 26.68 KG/M2

## 2024-10-07 DIAGNOSIS — R73.9 ELEVATED BLOOD SUGAR: ICD-10-CM

## 2024-10-07 DIAGNOSIS — F41.1 ANXIETY, GENERALIZED: Chronic | ICD-10-CM

## 2024-10-07 DIAGNOSIS — I10 ESSENTIAL HYPERTENSION: Primary | Chronic | ICD-10-CM

## 2024-10-07 DIAGNOSIS — K21.9 GASTROESOPHAGEAL REFLUX DISEASE WITHOUT ESOPHAGITIS: ICD-10-CM

## 2024-10-07 PROCEDURE — 3288F FALL RISK ASSESSMENT DOCD: CPT | Mod: HCNC,CPTII,S$GLB, | Performed by: INTERNAL MEDICINE

## 2024-10-07 PROCEDURE — 3079F DIAST BP 80-89 MM HG: CPT | Mod: HCNC,CPTII,S$GLB, | Performed by: INTERNAL MEDICINE

## 2024-10-07 PROCEDURE — 99213 OFFICE O/P EST LOW 20 MIN: CPT | Mod: HCNC,S$GLB,, | Performed by: INTERNAL MEDICINE

## 2024-10-07 PROCEDURE — 4010F ACE/ARB THERAPY RXD/TAKEN: CPT | Mod: HCNC,CPTII,S$GLB, | Performed by: INTERNAL MEDICINE

## 2024-10-07 PROCEDURE — 1126F AMNT PAIN NOTED NONE PRSNT: CPT | Mod: HCNC,CPTII,S$GLB, | Performed by: INTERNAL MEDICINE

## 2024-10-07 PROCEDURE — 3075F SYST BP GE 130 - 139MM HG: CPT | Mod: HCNC,CPTII,S$GLB, | Performed by: INTERNAL MEDICINE

## 2024-10-07 PROCEDURE — 1159F MED LIST DOCD IN RCRD: CPT | Mod: HCNC,CPTII,S$GLB, | Performed by: INTERNAL MEDICINE

## 2024-10-07 PROCEDURE — 99999 PR PBB SHADOW E&M-EST. PATIENT-LVL III: CPT | Mod: PBBFAC,HCNC,, | Performed by: INTERNAL MEDICINE

## 2024-10-07 PROCEDURE — 3008F BODY MASS INDEX DOCD: CPT | Mod: HCNC,CPTII,S$GLB, | Performed by: INTERNAL MEDICINE

## 2024-10-07 PROCEDURE — 1101F PT FALLS ASSESS-DOCD LE1/YR: CPT | Mod: HCNC,CPTII,S$GLB, | Performed by: INTERNAL MEDICINE

## 2024-10-07 PROCEDURE — 1160F RVW MEDS BY RX/DR IN RCRD: CPT | Mod: HCNC,CPTII,S$GLB, | Performed by: INTERNAL MEDICINE

## 2024-10-17 ENCOUNTER — LAB VISIT (OUTPATIENT)
Dept: LAB | Facility: HOSPITAL | Age: 69
End: 2024-10-17
Attending: INTERNAL MEDICINE
Payer: MEDICARE

## 2024-10-17 DIAGNOSIS — I10 ESSENTIAL HYPERTENSION: Chronic | ICD-10-CM

## 2024-10-17 DIAGNOSIS — R73.9 ELEVATED BLOOD SUGAR: ICD-10-CM

## 2024-10-17 LAB
ALBUMIN SERPL BCP-MCNC: 4.6 G/DL (ref 3.5–5.2)
ALBUMIN/CREAT UR: 9.1 UG/MG (ref 0–30)
ALP SERPL-CCNC: 38 U/L (ref 55–135)
ALT SERPL W/O P-5'-P-CCNC: 21 U/L (ref 10–44)
ANION GAP SERPL CALC-SCNC: 7 MMOL/L (ref 8–16)
AST SERPL-CCNC: 27 U/L (ref 10–40)
BILIRUB SERPL-MCNC: 0.7 MG/DL (ref 0.1–1)
BUN SERPL-MCNC: 16 MG/DL (ref 8–23)
CALCIUM SERPL-MCNC: 9.9 MG/DL (ref 8.7–10.5)
CHLORIDE SERPL-SCNC: 103 MMOL/L (ref 95–110)
CHOLEST SERPL-MCNC: 155 MG/DL (ref 120–199)
CHOLEST/HDLC SERPL: 4.3 {RATIO} (ref 2–5)
CO2 SERPL-SCNC: 27 MMOL/L (ref 23–29)
CREAT SERPL-MCNC: 1.1 MG/DL (ref 0.5–1.4)
CREAT UR-MCNC: 249.5 MG/DL (ref 23–375)
EST. GFR  (NO RACE VARIABLE): >60 ML/MIN/1.73 M^2
ESTIMATED AVG GLUCOSE: 157 MG/DL (ref 68–131)
GLUCOSE SERPL-MCNC: 158 MG/DL (ref 70–110)
HBA1C MFR BLD: 7.1 % (ref 4.5–6.2)
HDLC SERPL-MCNC: 36 MG/DL (ref 40–75)
HDLC SERPL: 23.2 % (ref 20–50)
LDLC SERPL CALC-MCNC: 95.2 MG/DL (ref 63–159)
MICROALBUMIN UR DL<=1MG/L-MCNC: 22.6 UG/ML
NONHDLC SERPL-MCNC: 119 MG/DL
POTASSIUM SERPL-SCNC: 4.4 MMOL/L (ref 3.5–5.1)
PROT SERPL-MCNC: 7.4 G/DL (ref 6–8.4)
SODIUM SERPL-SCNC: 137 MMOL/L (ref 136–145)
TRIGL SERPL-MCNC: 119 MG/DL (ref 30–150)

## 2024-10-17 PROCEDURE — 80061 LIPID PANEL: CPT | Performed by: INTERNAL MEDICINE

## 2024-10-17 PROCEDURE — 83036 HEMOGLOBIN GLYCOSYLATED A1C: CPT | Performed by: INTERNAL MEDICINE

## 2024-10-17 PROCEDURE — 82043 UR ALBUMIN QUANTITATIVE: CPT | Performed by: INTERNAL MEDICINE

## 2024-10-17 PROCEDURE — 36415 COLL VENOUS BLD VENIPUNCTURE: CPT | Performed by: INTERNAL MEDICINE

## 2024-10-17 PROCEDURE — 80053 COMPREHEN METABOLIC PANEL: CPT | Performed by: INTERNAL MEDICINE

## 2024-10-21 DIAGNOSIS — F41.1 ANXIETY, GENERALIZED: ICD-10-CM

## 2024-10-21 RX ORDER — PAROXETINE HYDROCHLORIDE HEMIHYDRATE 25 MG/1
25 TABLET, FILM COATED, EXTENDED RELEASE ORAL DAILY
Qty: 90 TABLET | Refills: 1 | Status: SHIPPED | OUTPATIENT
Start: 2024-10-21 | End: 2025-04-19

## 2024-10-21 NOTE — TELEPHONE ENCOUNTER
----- Message from Gregorio Grijalva MD sent at 10/18/2024  8:27 AM CDT -----  Please notify the patient that his hemoglobin A1c is 7.1 which is clearly in diabetic range urine also shows elevated protein.  Besides watch his diet he needs to continue with metformin and I may need to add another medication like Januvia for better control.  Let him also check how his blood sugars are doing and perhaps an early follow-up will be okay.

## 2024-10-22 ENCOUNTER — TELEPHONE (OUTPATIENT)
Dept: FAMILY MEDICINE | Facility: CLINIC | Age: 69
End: 2024-10-22
Payer: MEDICARE

## 2024-12-03 ENCOUNTER — HOSPITAL ENCOUNTER (EMERGENCY)
Facility: HOSPITAL | Age: 69
Discharge: HOME | End: 2024-12-03
Attending: EMERGENCY MEDICINE
Payer: MEDICARE

## 2024-12-03 VITALS
HEIGHT: 67 IN | WEIGHT: 170 LBS | HEART RATE: 58 BPM | BODY MASS INDEX: 26.68 KG/M2 | TEMPERATURE: 98 F | DIASTOLIC BLOOD PRESSURE: 71 MMHG | RESPIRATION RATE: 13 BRPM | OXYGEN SATURATION: 100 % | SYSTOLIC BLOOD PRESSURE: 159 MMHG

## 2024-12-03 DIAGNOSIS — R42 VERTIGO: Primary | ICD-10-CM

## 2024-12-03 DIAGNOSIS — R42 DIZZINESS: ICD-10-CM

## 2024-12-03 LAB
ALBUMIN SERPL BCP-MCNC: 4.6 G/DL (ref 3.5–5.2)
ALP SERPL-CCNC: 34 U/L (ref 55–135)
ALT SERPL W/O P-5'-P-CCNC: 20 U/L (ref 10–44)
ANION GAP SERPL CALC-SCNC: 3 MMOL/L (ref 8–16)
AST SERPL-CCNC: 21 U/L (ref 10–40)
BASOPHILS # BLD AUTO: 0.03 K/UL (ref 0–0.2)
BASOPHILS NFR BLD: 0.6 % (ref 0–1.9)
BILIRUB SERPL-MCNC: 0.5 MG/DL (ref 0.1–1)
BILIRUB UR QL STRIP: NEGATIVE
BUN SERPL-MCNC: 14 MG/DL (ref 8–23)
CALCIUM SERPL-MCNC: 9.4 MG/DL (ref 8.7–10.5)
CHLORIDE SERPL-SCNC: 106 MMOL/L (ref 95–110)
CLARITY UR: CLEAR
CO2 SERPL-SCNC: 27 MMOL/L (ref 23–29)
COLOR UR: COLORLESS
CREAT SERPL-MCNC: 1 MG/DL (ref 0.5–1.4)
DIFFERENTIAL METHOD BLD: ABNORMAL
EOSINOPHIL # BLD AUTO: 0.3 K/UL (ref 0–0.5)
EOSINOPHIL NFR BLD: 6.4 % (ref 0–8)
ERYTHROCYTE [DISTWIDTH] IN BLOOD BY AUTOMATED COUNT: 12.8 % (ref 11.5–14.5)
EST. GFR  (NO RACE VARIABLE): >60 ML/MIN/1.73 M^2
GLUCOSE SERPL-MCNC: 110 MG/DL (ref 70–110)
GLUCOSE UR QL STRIP: NEGATIVE
HCT VFR BLD AUTO: 38.8 % (ref 40–54)
HCV AB SERPL QL IA: NEGATIVE
HGB BLD-MCNC: 13.3 G/DL (ref 14–18)
HGB UR QL STRIP: NEGATIVE
HIV 1+2 AB+HIV1 P24 AG SERPL QL IA: NEGATIVE
IMM GRANULOCYTES # BLD AUTO: 0.02 K/UL (ref 0–0.04)
IMM GRANULOCYTES NFR BLD AUTO: 0.4 % (ref 0–0.5)
KETONES UR QL STRIP: NEGATIVE
LEUKOCYTE ESTERASE UR QL STRIP: NEGATIVE
LYMPHOCYTES # BLD AUTO: 1.1 K/UL (ref 1–4.8)
LYMPHOCYTES NFR BLD: 21.9 % (ref 18–48)
MCH RBC QN AUTO: 30.5 PG (ref 27–31)
MCHC RBC AUTO-ENTMCNC: 34.3 G/DL (ref 32–36)
MCV RBC AUTO: 89 FL (ref 82–98)
MONOCYTES # BLD AUTO: 0.4 K/UL (ref 0.3–1)
MONOCYTES NFR BLD: 7.8 % (ref 4–15)
NEUTROPHILS # BLD AUTO: 3.1 K/UL (ref 1.8–7.7)
NEUTROPHILS NFR BLD: 62.9 % (ref 38–73)
NITRITE UR QL STRIP: NEGATIVE
NRBC BLD-RTO: 0 /100 WBC
PH UR STRIP: 7 [PH] (ref 5–8)
PLATELET # BLD AUTO: 224 K/UL (ref 150–450)
PMV BLD AUTO: 10.1 FL (ref 9.2–12.9)
POCT GLUCOSE: 98 MG/DL (ref 70–110)
POTASSIUM SERPL-SCNC: 4 MMOL/L (ref 3.5–5.1)
PROT SERPL-MCNC: 7.2 G/DL (ref 6–8.4)
PROT UR QL STRIP: NEGATIVE
RBC # BLD AUTO: 4.36 M/UL (ref 4.6–6.2)
SODIUM SERPL-SCNC: 136 MMOL/L (ref 136–145)
SP GR UR STRIP: <1.005 (ref 1–1.03)
URN SPEC COLLECT METH UR: ABNORMAL
UROBILINOGEN UR STRIP-ACNC: NEGATIVE EU/DL
WBC # BLD AUTO: 4.85 K/UL (ref 3.9–12.7)

## 2024-12-03 PROCEDURE — 93005 ELECTROCARDIOGRAM TRACING: CPT | Performed by: GENERAL PRACTICE

## 2024-12-03 PROCEDURE — 25500020 PHARM REV CODE 255: Performed by: EMERGENCY MEDICINE

## 2024-12-03 PROCEDURE — 99285 EMERGENCY DEPT VISIT HI MDM: CPT | Mod: 25

## 2024-12-03 PROCEDURE — 25000003 PHARM REV CODE 250: Performed by: EMERGENCY MEDICINE

## 2024-12-03 PROCEDURE — 93010 ELECTROCARDIOGRAM REPORT: CPT | Mod: 76,,, | Performed by: GENERAL PRACTICE

## 2024-12-03 PROCEDURE — 82962 GLUCOSE BLOOD TEST: CPT

## 2024-12-03 PROCEDURE — 81003 URINALYSIS AUTO W/O SCOPE: CPT | Performed by: NURSE PRACTITIONER

## 2024-12-03 PROCEDURE — 80053 COMPREHEN METABOLIC PANEL: CPT | Performed by: NURSE PRACTITIONER

## 2024-12-03 PROCEDURE — 86803 HEPATITIS C AB TEST: CPT | Performed by: EMERGENCY MEDICINE

## 2024-12-03 PROCEDURE — 87389 HIV-1 AG W/HIV-1&-2 AB AG IA: CPT | Performed by: EMERGENCY MEDICINE

## 2024-12-03 PROCEDURE — 85025 COMPLETE CBC W/AUTO DIFF WBC: CPT | Performed by: NURSE PRACTITIONER

## 2024-12-03 PROCEDURE — 93010 ELECTROCARDIOGRAM REPORT: CPT | Mod: ,,, | Performed by: GENERAL PRACTICE

## 2024-12-03 RX ORDER — MECLIZINE HCL 12.5 MG 12.5 MG/1
25 TABLET ORAL
Status: COMPLETED | OUTPATIENT
Start: 2024-12-03 | End: 2024-12-03

## 2024-12-03 RX ADMIN — IOHEXOL 100 ML: 350 INJECTION, SOLUTION INTRAVENOUS at 04:12

## 2024-12-03 RX ADMIN — MECLIZINE 25 MG: 12.5 TABLET ORAL at 06:12

## 2024-12-03 NOTE — FIRST PROVIDER EVALUATION
Emergency Department TeleTriage Encounter Note      CHIEF COMPLAINT    Chief Complaint   Patient presents with    Dizziness     Pt states has had 2 episodes of dizziness. Pt states he went to stand up and got dizzy and then sat back down. It happened again so he came in. Pt states that this happened about 2 weeks ago too. Pt ambulated into triage with steady gait.        VITAL SIGNS   Initial Vitals [12/03/24 1457]   BP Pulse Resp Temp SpO2   (!) 159/82 67 18 97.5 °F (36.4 °C) 98 %      MAP       --            ALLERGIES    Review of patient's allergies indicates:   Allergen Reactions    Lisinopril Other (See Comments)     Causes dizziness.    Influenza virus vaccines      Gets sick with shots       PROVIDER TRIAGE NOTE  Verbal consent for the teletriage evaluation was given by the patient at the start of the evaluation.  All efforts will be made to maintain patient's privacy during the evaluation.      This is a teletriage evaluation of a 69 y.o. male presenting to the ED with c/o two episodes of dizziness today; had an episode last week as well.  Symptoms happened when he went from sitting to standing. Limited physical exam via telehealth: The patient is awake, alert, answering questions appropriately and is not in respiratory distress.  As the Teletriage provider, I performed an initial assessment and ordered appropriate labs and imaging studies, if any, to facilitate the patient's care once placed in the ED. Once a room is available, care and a full evaluation will be completed by an alternate ED provider.  Any additional orders and the final disposition will be determined by that provider.  All imaging and labs will not be followed-up by the Teletriage Team, including myself.         ORDERS  Labs Reviewed   HEPATITIS C ANTIBODY   HIV 1 / 2 ANTIBODY   CBC W/ AUTO DIFFERENTIAL   COMPREHENSIVE METABOLIC PANEL       ED Orders (720h ago, onward)      Start Ordered     Status Ordering Provider    12/03/24 [id number]  12/03/24 1501  Saline lock IV  Once         Ordered BILL ESCALANTE    12/03/24 1502 12/03/24 1501  Orthostatic blood pressure  Once         Ordered BILL ESCALANTE    12/03/24 1502 12/03/24 1501  Pulse Oximetry Continuous  Continuous         Ordered BILL ESCALANTE    12/03/24 1502 12/03/24 1501  Cardiac Monitoring - Adult  Continuous        Comments: Notify Physician If:    Ordered BILL ESCALANTE    12/03/24 1502 12/03/24 1501  EKG 12-lead  Once         Ordered BILL ESCALANTE    12/03/24 1502 12/03/24 1501  CBC auto differential  STAT         Ordered BILL ESCALANTE    12/03/24 1502 12/03/24 1501  Comprehensive metabolic panel  STAT         Ordered BILL ESCALANTE    12/03/24 1502 12/03/24 1501  POCT glucose  Once         Ordered BILL ESCALANTE    12/03/24 1502 12/03/24 1501  Urinalysis, Reflex to Urine Culture Urine, Clean Catch  STAT         Ordered BILL ESCALANTE    12/03/24 1459 12/03/24 1458  Hepatitis C Antibody  STAT         Ordered JESSE JONES    12/03/24 1459 12/03/24 1458  HIV 1/2 Ag/Ab (4th Gen)  STAT         Ordered JESSE JONES              Virtual Visit Note: The provider triage portion of this emergency department evaluation and documentation was performed via [employer], a HIPAA-compliant telemedicine application, in concert with a tele-presenter in the room. A face to face patient evaluation with one of my colleagues will occur once the patient is placed in an emergency department room.      DISCLAIMER: This note was prepared with [employer] voice recognition transcription software. Garbled syntax, mangled pronouns, and other bizarre constructions may be attributed to that software system.

## 2024-12-04 ENCOUNTER — TELEPHONE (OUTPATIENT)
Dept: FAMILY MEDICINE | Facility: CLINIC | Age: 69
End: 2024-12-04
Payer: MEDICARE

## 2024-12-04 NOTE — TELEPHONE ENCOUNTER
----- Message from Destiny sent at 12/3/2024  2:41 PM CST -----  Regarding: dizziness  Patient is requesting for someone to give him a call to talk about dizzy spells that he's been getting off and on the past few weeks. I was scheduling an appointment for him to be seen but he would rather talk to someone who knows his history and his next appointment isnt until January 27th. Thanks.

## 2024-12-04 NOTE — ED PROVIDER NOTES
Encounter Date: 12/3/2024       History     Chief Complaint   Patient presents with    Dizziness     Pt states has had 2 episodes of dizziness. Pt states he went to stand up and got dizzy and then sat back down. It happened again so he came in. Pt states that this happened about 2 weeks ago too. Pt ambulated into triage with steady gait.      HPI    Seen and evaluated presented with a chief complaint of dizziness.  He describes both vertiginous and lightheaded symptoms.  He states that this is happened episodically twice since Friday.  He notes that it also happened about 2 weeks ago.  Here currently the symptoms have largely resolved.  He states that if he looks upright or stands up quickly he can provoke the symptoms.  He is currently ambulating without deficit and denies feeling weak.  When further probing the patient, he stated that it is largely lightheadedness with intermittent bouts of room spinning.  He states he does not feel loopy your imbalanced, but does feel presyncopal like he may pass out.  This is an episodic process that started several weeks ago resolved and recurred.  It is currently ongoing.  He denies any alleviating or exacerbating factors.  He has no associated Chest pain fever chills nausea or diarrhea.    Review of patient's allergies indicates:   Allergen Reactions    Lisinopril Other (See Comments)     Causes dizziness.    Influenza virus vaccines      Gets sick with shots     Past Medical History:   Diagnosis Date    Acute bronchitis 6/1/2017    Allergy     atorvastatin, Influenza vaccine    Diabetes mellitus     Diabetes mellitus, type 2     Eosinophilic esophagitis 2/18/2020    Based upon endoscopy done by Dr. Clark Sprague.    GERD (gastroesophageal reflux disease)     History of endoscopy 4/6/2021    Dr. Clark Sprague.  Esophagus appears to have mucosal like eosinophilic esophagitis.  Pathology did not indicate any evidence of malignancy.    Hx of rhinoplasty 10/17/2018     Hyperlipidemia     Hypertension      History reviewed. No pertinent surgical history.  Family History   Problem Relation Name Age of Onset    Arthritis Mother      Alzheimer's disease Mother      Cancer Father       Social History     Tobacco Use    Smoking status: Former     Current packs/day: 0.00     Types: Cigarettes     Quit date: 1975     Years since quittin.9    Smokeless tobacco: Never   Substance Use Topics    Alcohol use: No    Drug use: No     Review of Systems   Constitutional:  Negative for fever.   HENT:  Negative for sore throat.    Respiratory:  Negative for shortness of breath.    Cardiovascular:  Negative for chest pain.   Gastrointestinal:  Negative for nausea.   Genitourinary:  Negative for dysuria.   Musculoskeletal:  Negative for back pain.   Skin:  Negative for rash.   Neurological:  Positive for dizziness and light-headedness. Negative for weakness.       Physical Exam     Initial Vitals [24 1457]   BP Pulse Resp Temp SpO2   (!) 159/82 67 18 97.5 °F (36.4 °C) 98 %      MAP       --         Physical Exam    Nursing note and vitals reviewed.  Constitutional: He appears well-developed and well-nourished.   HENT:   Head: Normocephalic and atraumatic.   Eyes: Conjunctivae are normal.   Cardiovascular:  Normal rate and regular rhythm.           Pulmonary/Chest: No respiratory distress.   Abdominal: Abdomen is soft.   Musculoskeletal:         General: Normal range of motion.     Neurological: He is alert and oriented to person, place, and time.   Skin: Skin is warm and dry.   Psychiatric: He has a normal mood and affect. His speech is normal.         ED Course   Procedures  Labs Reviewed   CBC W/ AUTO DIFFERENTIAL - Abnormal       Result Value    WBC 4.85      RBC 4.36 (*)     Hemoglobin 13.3 (*)     Hematocrit 38.8 (*)     MCV 89      MCH 30.5      MCHC 34.3      RDW 12.8      Platelets 224      MPV 10.1      Immature Granulocytes 0.4      Gran # (ANC) 3.1      Immature Grans (Abs)  0.02      Lymph # 1.1      Mono # 0.4      Eos # 0.3      Baso # 0.03      nRBC 0      Gran % 62.9      Lymph % 21.9      Mono % 7.8      Eosinophil % 6.4      Basophil % 0.6      Differential Method Automated      Narrative:     Release to patient->Immediate   COMPREHENSIVE METABOLIC PANEL - Abnormal    Sodium 136      Potassium 4.0      Chloride 106      CO2 27      Glucose 110      BUN 14      Creatinine 1.0      Calcium 9.4      Total Protein 7.2      Albumin 4.6      Total Bilirubin 0.5      Alkaline Phosphatase 34 (*)     AST 21      ALT 20      eGFR >60.0      Anion Gap 3 (*)     Narrative:     Release to patient->Immediate   URINALYSIS, REFLEX TO URINE CULTURE - Abnormal    Specimen UA Urine, Clean Catch      Color, UA Colorless (*)     Appearance, UA Clear      pH, UA 7.0      Specific Gravity, UA <1.005 (*)     Protein, UA Negative      Glucose, UA Negative      Ketones, UA Negative      Bilirubin (UA) Negative      Occult Blood UA Negative      Nitrite, UA Negative      Urobilinogen, UA Negative      Leukocytes, UA Negative      Narrative:     Specimen Source->Urine   HEPATITIS C ANTIBODY   HIV 1 / 2 ANTIBODY   POCT GLUCOSE    POCT Glucose 98     POCT GLUCOSE MONITORING CONTINUOUS     EKG Readings: (Independently Interpreted)   Sinus bradycardia 59 beats per minute no STEMI     ECG Results              EKG 12-lead (In process)        Collection Time Result Time QRS Duration OHS QTC Calculation    12/03/24 15:22:53 12/03/24 15:30:09 86 386                     In process by Interface, Lab In [facility] (12/03/24 15:30:17)                   Narrative:    Test Reason : R42,    Vent. Rate :  59 BPM     Atrial Rate :  59 BPM     P-R Int : 172 ms          QRS Dur :  86 ms      QT Int : 390 ms       P-R-T Axes :  59  12  20 degrees    QTcB Int : 386 ms    Sinus bradycardia  Otherwise normal ECG  When compared with ECG of 16-Jan-2016 15:09,  No significant change was found    Referred By:            Confirmed By:                                    Imaging Results              CTA Head and Neck (xpd) (Final result)  Result time 12/03/24 17:19:00      Final result by Cipriano Child MD (12/03/24 17:19:00)                   Impression:      No acute abnormality. No high-grade stenosis or major vessel occlusion.      Electronically signed by: Cipriano Child  Date:    12/03/2024  Time:    17:19               Narrative:    EXAMINATION:  CTA HEAD AND NECK (XPD)    CLINICAL HISTORY:  Dizziness, persistent/recurrent, cardiac or vascular cause suspected;    TECHNIQUE:  CT angiogram was performed from the level of the cirilo to the top of the head following the IV administration of 100mL of Omnipaque 350.   Sagittal and coronal reconstructions and maximum intensity projection reconstructions were performed. Arterial stenosis percentages are based on NASCET measurement criteria.    COMPARISON:  None    FINDINGS:  Aorta: No acute findings.    Extracranial carotid circulation: No hemodynamically significant stenosis, aneurysmal dilatation, or dissection.    Extracranial vertebral circulation: No hemodynamically significant stenosis, aneurysmal dilatation, or dissection.    Intracranial Arteries: No focal high-grade stenosis, occlusion, or aneurysm.    Venous structures (limited evaluation): Normal.                                       CT Head Without Contrast (Final result)  Result time 12/03/24 17:00:21      Final result by Faviola Mcgill MD (12/03/24 17:00:21)                   Impression:      1. Normal CT appearance of the brain.      Electronically signed by: Faviola Mcgill  Date:    12/03/2024  Time:    17:00               Narrative:    EXAMINATION:  CT HEAD WITHOUT CONTRAST    CLINICAL HISTORY:  Dizziness, persistent/recurrent, cardiac or vascular cause suspected;.    TECHNIQUE:  CMS MANDATED QUALITY DATA - CT RADIATION - 436    All CT scans at this facility utilize dose modulation, iterative reconstruction, and/or  weight based dosing when appropriate to reduce radiation dose to as low as reasonably achievable.    Non infusion images were obtained from the skull base to the vertex.    COMPARISON:  08/22/2018    FINDINGS:  There is no evidence of intracranial mass, hemorrhage, or midline shift.  The ventricles and sulci are within normal limits.  There are no pathologic extra-axial fluid collections.    There is no evidence of ischemic change or edema.  Cerebellum and brainstem are unremarkable.  The orbits are unremarkable.    The calvarium is intact.  Paranasal sinuses and mastoid air cells are clear.                                       Medications   meclizine tablet 25 mg (has no administration in time range)   iohexoL (OMNIPAQUE 350) injection 100 mL (100 mLs Intravenous Given 12/3/24 [id number])     Medical Decision Making  Seen and evaluated.  Presented with a chief complaint of dizziness and lightheadedness.  Reports intermittent episodic bouts of dizziness which he describes as a presyncopal with occasional vertiginous components.  Laboratory evaluation completed.  This is largely stable.  Additionally CT head and CTA head were completed.  These were negative.  MRI brain ordered after discussion with Neurology in consultation.  Should MRI be negative, will provide outpatient follow-up.  Additionally, orthostatics were performed.  They were not positive.  He is asymptomatic at this time and hemodynamically stable.  Currently pending the results of MRI. MRI and repeat ECG stbale will discharge with out patient follow up.    Melchor Charlton MD MPH  12/03/2024 10:30 PM          Amount and/or Complexity of Data Reviewed  Radiology: ordered.    Risk  Prescription drug management.                                      Clinical Impression:  Final diagnoses:  [R42] Dizziness                 Melchor Charlton Jr., MD  12/03/24 [id number]

## 2024-12-06 LAB
OHS QRS DURATION: 84 MS
OHS QRS DURATION: 86 MS
OHS QTC CALCULATION: 386 MS
OHS QTC CALCULATION: 403 MS

## 2024-12-12 ENCOUNTER — OFFICE VISIT (OUTPATIENT)
Dept: FAMILY MEDICINE | Facility: CLINIC | Age: 69
End: 2024-12-12
Payer: MEDICARE

## 2024-12-12 VITALS
SYSTOLIC BLOOD PRESSURE: 128 MMHG | BODY MASS INDEX: 27.31 KG/M2 | DIASTOLIC BLOOD PRESSURE: 70 MMHG | WEIGHT: 174 LBS | HEIGHT: 67 IN

## 2024-12-12 DIAGNOSIS — I10 ESSENTIAL HYPERTENSION: Primary | ICD-10-CM

## 2024-12-12 DIAGNOSIS — K21.9 GASTROESOPHAGEAL REFLUX DISEASE WITHOUT ESOPHAGITIS: ICD-10-CM

## 2024-12-12 DIAGNOSIS — E78.2 MIXED DYSLIPIDEMIA: ICD-10-CM

## 2024-12-12 DIAGNOSIS — Z12.5 SCREENING FOR PROSTATE CANCER: ICD-10-CM

## 2024-12-12 DIAGNOSIS — F41.1 ANXIETY, GENERALIZED: Chronic | ICD-10-CM

## 2024-12-12 DIAGNOSIS — R42 DIZZINESS: ICD-10-CM

## 2024-12-12 DIAGNOSIS — E11.9 TYPE 2 DIABETES MELLITUS WITHOUT COMPLICATION, WITHOUT LONG-TERM CURRENT USE OF INSULIN: ICD-10-CM

## 2024-12-12 PROCEDURE — 99999 PR PBB SHADOW E&M-EST. PATIENT-LVL III: CPT | Mod: PBBFAC,HCNC,, | Performed by: INTERNAL MEDICINE

## 2024-12-12 RX ORDER — GLIPIZIDE 2.5 MG/1
2.5 TABLET, EXTENDED RELEASE ORAL
Qty: 90 TABLET | Refills: 3 | Status: SHIPPED | OUTPATIENT
Start: 2024-12-12 | End: 2025-12-12

## 2024-12-12 NOTE — PROGRESS NOTES
Subjective:       Patient ID: Yan Hernandez Jr. is a 69 y.o. male.    Chief Complaint: Hypertension, Hyperlipidemia, Diabetes, and Dizziness    History of Present Illness    CHIEF COMPLAINT:  Yan presents for follow-up after an episode of dizziness and lightheadedness that led to an ER visit and subsequent ENT evaluation.    HPI:  Yan experienced two episodes of dizziness and lightheadedness. The first episode occurred on a Friday, lasting 2-3 minutes. The second episode happened the following Tuesday, prompting an ER visit. During these episodes, the patient felt a sensation of blood rushing downward when trying to stand, requiring a return to a seated position. Yan denies nausea, vomiting, or chest pain associated with these episodes.    Extensive testing was performed in the ER, including an MRI, CT angiogram of the head and carotid vessels, EKG, and various blood and urine tests. The ER doctor suggested a possible inner ear issue and referred the patient to an ENT specialist, who found no inner ear problems. Since these evaluations, the patient reports feeling well with no recurrence of symptoms.    Yan considers the possibility of consuming something that triggered the symptoms but cannot recall specifics. He denies alcohol consumption or intentional medication overdose related to the incident.    Yan denies chest pain, tightness, buzzing in the ears, hearing loss, abdominal pains, nausea, vomiting, and tinnitus. He also denies having had a stroke, heart attack, or any significant stenosis or blockage in the blood vessels.    MEDICATIONS:  Yan is on Losartan 50 mg daily for blood pressure, Metformin 1000 mg twice daily for blood sugar, and Paroxetine daily to maintain positive mood. He is also taking Rosuvastatin 20 mg daily for cholesterol management, Fenofibrate 160 mg daily for triglyceride management, and Aspirin daily for cardiovascular risk reduction.    MEDICAL HISTORY:  Yan has a history  of hypertension, diabetes, hyperlipidemia, and anxiety.    TEST RESULTS:  Yan's cholesterol level on October 17, 2024, was 155 mg/dL, which is an increase from 137 mg/dL one year prior. His LDL on the same date was 95 mg/dL. The A1c test on October 17, 2024, showed a result of 7.1%, with his previously best result being 6.2%. An EKG performed on December 3, 2024, showed sinus bradycardia with a heart rate of 59, with no evidence of heart attack. The Roque Hallpike maneuver was negative for vertigo and nystagmus. A urine test conducted in the ER on December 3, 2024, showed no signs of infection.    IMAGING:  On December 3, 2024, the patient underwent a CT Head scan which showed no hemodynamically significant stenosis, blockage, or aneurysm. A CT Angiogram of the head on the same date revealed no blockage inside the head. An MRI of the brain, also performed on December 3, 2024, showed minor circulation problems (chronic microvascular ischemia), but no mass lesions, acute hemorrhage, edema, stroke, or infarct were observed.    SOCIAL HISTORY:  Yan quit smoking in 1975, approximately 45-50 years ago.      ROS:  ENT: -vertigo  Cardiovascular: -chest pain  Gastrointestinal: -nausea, -vomiting  Neurological: +dizziness  Psychiatric: +anxiety         Past Medical History:   Diagnosis Date    Acute bronchitis 6/1/2017    Allergy     atorvastatin, Influenza vaccine    Diabetes mellitus     Diabetes mellitus, type 2     Eosinophilic esophagitis 2/18/2020    Based upon endoscopy done by Dr. Clark Sprague.    GERD (gastroesophageal reflux disease)     History of endoscopy 4/6/2021    Dr. Clark Sprague.  Esophagus appears to have mucosal like eosinophilic esophagitis.  Pathology did not indicate any evidence of malignancy.    Hx of rhinoplasty 10/17/2018    Hyperlipidemia     Hypertension      Social History     Socioeconomic History    Marital status: Single    Number of children: 0   Occupational History     "Occupation: [employer]     Employer: [employer]   Tobacco Use    Smoking status: Former     Current packs/day: 0.00     Types: Cigarettes     Quit date: 1975     Years since quittin.9    Smokeless tobacco: Never   Substance and Sexual Activity    Alcohol use: No    Drug use: No    Sexual activity: Not Currently     Social Drivers of Health     Financial Resource Strain: Low Risk  (2022)    Overall Financial Resource Strain (CARDIA)     Difficulty of Paying Living Expenses: Not very hard   Food Insecurity: No Food Insecurity (2022)    Hunger Vital Sign     Worried About Running Out of Food in the Last Year: Never true     Ran Out of Food in the Last Year: Never true   Transportation Needs: No Transportation Needs (2022)    PRAPARE - Transportation     Lack of Transportation (Medical): No     Lack of Transportation (Non-Medical): No   Physical Activity: Inactive (2022)    Exercise Vital Sign     Days of Exercise per Week: 0 days     Minutes of Exercise per Session: 0 min   Stress: Stress Concern Present (2022)    [employer] [place] [employer] - Occupational Stress Questionnaire     Feeling of Stress : To some extent   Housing Stability: Low Risk  (2022)    Housing Stability Vital Sign     Unable to Pay for Housing in the Last Year: No     Number of Places Lived in the Last Year: 1     Unstable Housing in the Last Year: No     History reviewed. No pertinent surgical history.  Family History   Problem Relation Name Age of Onset    Arthritis Mother      Alzheimer's disease Mother      Cancer Father         Objective:      Physical Exam  Blood pressure 128/70, pulse (!) (P) 57, height 5' 7" (1.702 m), weight 78.9 kg (174 lb). Body mass index is 27.25 kg/m².  Physical Exam    General: No acute distress. Well-developed. Well-nourished.  Eyes: EOMI. Sclerae anicteric.  HENT: Normocephalic. Atraumatic. Nares patent. Moist oral mucosa.  Ears: " Bilateral TMs clear. Bilateral EACs clear.  Cardiovascular: Regular rate. Regular rhythm. No murmurs. No rubs. No gallops. Normal S1, S2.  Respiratory: Normal respiratory effort. Clear to auscultation bilaterally. No rales. No rhonchi. No wheezing.  Abdomen: Soft. Non-tender. Non-distended. Normoactive bowel sounds.  Musculoskeletal: No  obvious deformity.  Extremities: No lower extremity edema.  Neurological: Alert & oriented x3. No slurred speech. Normal gait. Bloxom-Hallpike maneuver negative for vertigo.  Psychiatric: Normal mood. Normal affect. Good insight. Good judgment.  Skin: Warm. Dry. No rash.  Mouth: Mallampati score 3.              Assessment:       Admission on 12/03/2024, Discharged on 12/03/2024   Component Date Value Ref Range Status    Hepatitis C Ab 12/03/2024 Negative  Negative Final    HIV 1/2 Ag/Ab 12/03/2024 Negative  Negative Final    QRS Duration 12/03/2024 86  ms Final    OHS QTC Calculation 12/03/2024 386  ms Final    WBC 12/03/2024 4.85  3.90 - 12.70 K/uL Final    RBC 12/03/2024 4.36 (L)  4.60 - 6.20 M/uL Final    Hemoglobin 12/03/2024 13.3 (L)  14.0 - 18.0 g/dL Final    Hematocrit 12/03/2024 38.8 (L)  40.0 - 54.0 % Final    MCV 12/03/2024 89  82 - 98 fL Final    MCH 12/03/2024 30.5  27.0 - 31.0 pg Final    MCHC 12/03/2024 34.3  32.0 - 36.0 g/dL Final    RDW 12/03/2024 12.8  11.5 - 14.5 % Final    Platelets 12/03/2024 224  150 - 450 K/uL Final    MPV 12/03/2024 10.1  9.2 - 12.9 fL Final    Immature Granulocytes 12/03/2024 0.4  0.0 - 0.5 % Final    Gran # (ANC) 12/03/2024 3.1  1.8 - 7.7 K/uL Final    Immature Grans (Abs) 12/03/2024 0.02  0.00 - 0.04 K/uL Final    Lymph # 12/03/2024 1.1  1.0 - 4.8 K/uL Final    Mono # 12/03/2024 0.4  0.3 - 1.0 K/uL Final    Eos # 12/03/2024 0.3  0.0 - 0.5 K/uL Final    Baso # 12/03/2024 0.03  0.00 - 0.20 K/uL Final    nRBC 12/03/2024 0  0 /100 WBC Final    Gran % 12/03/2024 62.9  38.0 - 73.0 % Final    Lymph % 12/03/2024 21.9  18.0 -  48.0 % Final    Mono % 12/03/2024 7.8  4.0 - 15.0 % Final    Eosinophil % 12/03/2024 6.4  0.0 - 8.0 % Final    Basophil % 12/03/2024 0.6  0.0 - 1.9 % Final    Differential Method 12/03/2024 Automated   Final    Sodium 12/03/2024 136  136 - 145 mmol/L Final    Potassium 12/03/2024 4.0  3.5 - 5.1 mmol/L Final    Chloride 12/03/2024 106  95 - 110 mmol/L Final    CO2 12/03/2024 27  23 - 29 mmol/L Final    Glucose 12/03/2024 110  70 - 110 mg/dL Final    BUN 12/03/2024 14  8 - 23 mg/dL Final    Creatinine 12/03/2024 1.0  0.5 - 1.4 mg/dL Final    Calcium 12/03/2024 9.4  8.7 - 10.5 mg/dL Final    Total Protein 12/03/2024 7.2  6.0 - 8.4 g/dL Final    Albumin 12/03/2024 4.6  3.5 - 5.2 g/dL Final    Total Bilirubin 12/03/2024 0.5  0.1 - 1.0 mg/dL Final    Alkaline Phosphatase 12/03/2024 34 (L)  55 - 135 U/L Final    AST 12/03/2024 21  10 - 40 U/L Final    ALT 12/03/2024 20  10 - 44 U/L Final    eGFR 12/03/2024 >60.0  >60 mL/min/1.73 m^2 Final    Anion Gap 12/03/2024 3 (L)  8 - 16 mmol/L Final    Specimen UA 12/03/2024 Urine, Clean Catch   Final    Color, UA 12/03/2024 Colorless (A)  Yellow, Straw, Nina Final    Appearance, UA 12/03/2024 Clear  Clear Final    pH, UA 12/03/2024 7.0  5.0 - 8.0 Final    Specific Gravity, UA 12/03/2024 <1.005 (A)  1.005 - 1.030 Final    Protein, UA 12/03/2024 Negative  Negative Final    Glucose, UA 12/03/2024 Negative  Negative Final    Ketones, UA 12/03/2024 Negative  Negative Final    Bilirubin (UA) 12/03/2024 Negative  Negative Final    Occult Blood UA 12/03/2024 Negative  Negative Final    Nitrite, UA 12/03/2024 Negative  Negative Final    Urobilinogen, UA 12/03/2024 Negative  Negative EU/dL Final    Leukocytes, UA 12/03/2024 Negative  Negative Final    POCT Glucose 12/03/2024 98  70 - 110 mg/dL Final    QRS Duration 12/03/2024 84  ms Final    OHS QTC Calculation 12/03/2024 403  ms Final   Lab Visit on 10/17/2024   Component Date Value Ref Range Status     Hemoglobin A1C 10/17/2024 7.1 (H)  4.5 - 6.2 % Final    Estimated Avg Glucose 10/17/2024 157 (H)  68 - 131 mg/dL Final    Microalbumin, Urine 10/17/2024 22.6 (H)  <19.9 ug/mL Final    Creatinine, Urine 10/17/2024 249.5  23.0 - 375.0 mg/dL Final    Microalb/Creat Ratio 10/17/2024 9.1  0.0 - 30.0 ug/mg Final    Sodium 10/17/2024 137  136 - 145 mmol/L Final    Potassium 10/17/2024 4.4  3.5 - 5.1 mmol/L Final    Chloride 10/17/2024 103  95 - 110 mmol/L Final    CO2 10/17/2024 27  23 - 29 mmol/L Final    Glucose 10/17/2024 158 (H)  70 - 110 mg/dL Final    BUN 10/17/2024 16  8 - 23 mg/dL Final    Creatinine 10/17/2024 1.1  0.5 - 1.4 mg/dL Final    Calcium 10/17/2024 9.9  8.7 - 10.5 mg/dL Final    Total Protein 10/17/2024 7.4  6.0 - 8.4 g/dL Final    Albumin 10/17/2024 4.6  3.5 - 5.2 g/dL Final    Total Bilirubin 10/17/2024 0.7  0.1 - 1.0 mg/dL Final    Alkaline Phosphatase 10/17/2024 38 (L)  55 - 135 U/L Final    AST 10/17/2024 27  10 - 40 U/L Final    ALT 10/17/2024 21  10 - 44 U/L Final    eGFR 10/17/2024 >60.0  >60 mL/min/1.73 m^2 Final    Anion Gap 10/17/2024 7 (L)  8 - 16 mmol/L Final    Cholesterol 10/17/2024 155  120 - 199 mg/dL Final    Triglycerides 10/17/2024 119  30 - 150 mg/dL Final    HDL 10/17/2024 36 (L)  40 - 75 mg/dL Final    LDL Cholesterol 10/17/2024 95.2  63.0 - 159.0 mg/dL Final    HDL/Cholesterol Ratio 10/17/2024 23.2  20.0 - 50.0 % Final    Total Cholesterol/HDL Ratio 10/17/2024 4.3  2.0 - 5.0 Final    Non-HDL Cholesterol 10/17/2024 119  mg/dL Final       Assessment & Plan    IMPRESSION:  - Evaluated patient's recent episode of dizziness and lightheadedness  - Reviewed ER workup including MRI, CT angiogram of head and carotid vessels, which were unremarkable  - Noted minor microvascular ischemia on MRI, deemed not immediately concerning  - Performed Roque-Hallpike maneuver, negative for nystagmus  - Considered vestibular neuritis as possible cause  - Ruled out food  poisoning, medication overdose, and stroke  - Noted rise in A1c to 7.1, previously 6.2  - Observed increase in cholesterol levels, potentially due to missed doses    E11.65 TYPE 2 DIABETES MELLITUS WITH HYPERGLYCEMIA:  - Started Glipizide 2.5 mg daily in the morning, in addition to current Metformin regimen.  - Continued Metformin 1000 mg twice daily for blood sugar.  - Ordered A1c level test in 4 months.  - Ordered basic chemistry test in 4 months.  - Ordered urine test in 4 months.    I10 ESSENTIAL (PRIMARY) HYPERTENSION:  - Continued Losartan 50 mg for blood pressure.    E78.2 MIXED HYPERLIPIDEMIA:  - Continued Rosuvastatin 20 mg at bedtime for cholesterol.  - Continued Fenofibrate 160 mg for triglycerides.    F41.9 ANXIETY DISORDER, UNSPECIFIED:  - Continued Paroxetine for mood.    I67.2 CEREBRAL ATHEROSCLEROSIS:  - Continued Aspirin for cardiovascular protection.    Z79.84 LONG TERM (CURRENT) USE OF ORAL HYPOGLYCEMIC DRUGS:  - Continued Metformin 1000 mg twice daily for blood sugar.    Z91.128 PATIENT'S INTENTIONAL UNDERDOSING OF MEDICATION REGIMEN FOR OTHER REASON:  - Explained generic vs. branded medications.    Z71.3 DIETARY COUNSELING AND SURVEILLANCE:  - Explained appropriate seafood preparation methods for diabetes management.  - Yan to continue grilling, baking, or broiling seafood instead of frying.    Z13.6 ENCOUNTER FOR SCREENING FOR CARDIOVASCULAR DISORDERS:  - Ordered PSA (prostate-specific antigen) blood test in 4 months.    LIFESTYLE CHANGES:  - Discussed importance of diet in managing blood sugar.  - Yan to reduce consumption of cookies, sweets, sugars, white rice, and pasta.  - Yan to avoid fried foods.  - Yan to watch diet to manage weight and blood sugar.  - Yan to continue exercise regimen.  - Follow up in 4 months.         Plan:   Essential hypertension  Comments:  losartan 50 mg.  Continue to watch diet, weight management and exercise.  Orders:  -     Comprehensive Metabolic  Panel; Future; Expected date: 03/12/2025  -     Microalbumin/Creatinine Ratio, Urine; Future; Expected date: 03/12/2025  -     Lipid Panel; Future; Expected date: 12/13/2024    Gastroesophageal reflux disease without esophagitis    Type 2 diabetes mellitus without complication, without long-term current use of insulin  Comments:  A1c level has gone up to 7.1.  Add glipizide 2.5 mg per day on top of metformin 1000 b.i.d..  Consider Jardiance in future if financial situation okay.  Orders:  -     glipiZIDE (GLUCOTROL) 2.5 MG TR24; Take 1 tablet (2.5 mg total) by mouth daily with breakfast.  Dispense: 90 tablet; Refill: 3  -     Hemoglobin A1C; Future; Expected date: 03/12/2025  -     Comprehensive Metabolic Panel; Future; Expected date: 03/12/2025  -     Microalbumin/Creatinine Ratio, Urine; Future; Expected date: 03/12/2025  -     Lipid Panel; Future; Expected date: 12/13/2024    Mixed hyperlipidemia  Comments:  Continue rosuvastatin 20 mg at bedtime.  Orders:  -     Lipid Panel; Future; Expected date: 12/13/2024    Dizziness  Comments:  Hearing loss and no tinnitus.  No nausea or vomiting.  Possibly vestibular neuronitis.  Unlikely any food poisoning or overdosing on BP medications    Anxiety, generalized  Comments:  Pt does take paroxetine or Paxil 25 mg which keeps his anxiety under control.  This med may have some weight gain or increase appetite properties.Watch diet    Screening for prostate cancer  -     PSA, SCREENING; Future; Expected date: 03/12/2025      Patient's medical conditions has been noted.  Blood pressures are doing okay.  Some rise in his cholesterol level has been noted which he admits that he might have missed his cholesterol and fenofibrate for a week or so.  Rise in blood sugar has been noted and will add glipizide.  Stress and anxiety is doing okay  His episode of dizziness has been noted without any hearing loss or tinnitus.  He did not have any nausea or vomiting.  No clear-cut history of  any food poisoning or overdosing on blood pressure medication.  No flu symptoms.  Extensive workup in the ER was done including MRI of the brain, CT angiogram of the head and carotid vessels and they were unremarkable.  My minor microvascular ischemia was noted in the MRI.  Does not have any immediate bearing upon that.  Hopefully this did not happen again.  I did perform a [place]-Hallpike maneuver and it was negative for nystagmus.  Generally Yan is a happy camper accept that he gets upset if I am late in his office and instead of telling me good morning he calls me good afternoon.  Follow up in about 4 months (around 4/12/2025), or if symptoms worsen or fail to improve, for Diabetes/HTN/Lipids.      Current Outpatient Medications:     aspirin (ECOTRIN) 81 MG EC tablet, Take 1 tablet (81 mg total) by mouth once daily., Disp: 100 tablet, Rfl: 4    fenofibrate 160 MG Tab, Take 1 tablet (160 mg total) by mouth every evening., Disp: 90 tablet, Rfl: 2    losartan (COZAAR) 50 MG tablet, Take 1 tablet by mouth once daily, Disp: 30 tablet, Rfl: 8    metFORMIN (GLUCOPHAGE) 1000 MG tablet, TAKE 1 TABLET BY MOUTH TWICE DAILY WITH MEALS, Disp: 180 tablet, Rfl: 1    paroxetine (PAXIL-CR) 25 MG 24 hr tablet, Take 1 tablet (25 mg total) by mouth once daily., Disp: 90 tablet, Rfl: 1    rosuvastatin (CRESTOR) 20 MG tablet, Take 20 mg by mouth once daily., Disp: , Rfl:     glipiZIDE (GLUCOTROL) 2.5 MG TR24, Take 1 tablet (2.5 mg total) by mouth daily with breakfast., Disp: 90 tablet, Rfl: 3    This note was generated with the assistance of ambient listening technology. Verbal consent was obtained by the patient and accompanying visitor(s) for the recording of patient appointment to facilitate this note. I attest to having reviewed and edited the generated note for accuracy, though some syntax or spelling errors may persist. Please contact the author of this note for any clarification.      Gregorio Grijalva

## 2025-01-17 ENCOUNTER — HOSPITAL ENCOUNTER (EMERGENCY)
Facility: HOSPITAL | Age: 70
Discharge: HOME | End: 2025-01-17
Attending: EMERGENCY MEDICINE
Payer: MEDICARE

## 2025-01-17 VITALS
TEMPERATURE: 97 F | OXYGEN SATURATION: 96 % | RESPIRATION RATE: 20 BRPM | BODY MASS INDEX: 27.31 KG/M2 | HEART RATE: 74 BPM | DIASTOLIC BLOOD PRESSURE: 68 MMHG | SYSTOLIC BLOOD PRESSURE: 145 MMHG | HEIGHT: 67 IN | WEIGHT: 174 LBS

## 2025-01-17 DIAGNOSIS — I10 HYPERTENSION, UNSPECIFIED TYPE: Primary | ICD-10-CM

## 2025-01-17 DIAGNOSIS — R53.1 WEAKNESS: ICD-10-CM

## 2025-01-17 DIAGNOSIS — F41.9 ANXIETY: ICD-10-CM

## 2025-01-17 LAB
ALBUMIN SERPL BCP-MCNC: 4.8 G/DL (ref 3.5–5.2)
ALP SERPL-CCNC: 34 U/L (ref 55–135)
ALT SERPL W/O P-5'-P-CCNC: 17 U/L (ref 10–44)
ANION GAP SERPL CALC-SCNC: 9 MMOL/L (ref 8–16)
APTT PPP: 26.8 SEC (ref 21–32)
AST SERPL-CCNC: 21 U/L (ref 10–40)
BASOPHILS # BLD AUTO: 0.03 K/UL (ref 0–0.2)
BASOPHILS NFR BLD: 0.3 % (ref 0–1.9)
BILIRUB SERPL-MCNC: 0.5 MG/DL (ref 0.1–1)
BNP SERPL-MCNC: 20 PG/ML (ref 0–99)
BNP SERPL-MCNC: 20 PG/ML (ref 0–99)
BUN SERPL-MCNC: 20 MG/DL (ref 8–23)
CALCIUM SERPL-MCNC: 9.6 MG/DL (ref 8.7–10.5)
CHLORIDE SERPL-SCNC: 102 MMOL/L (ref 95–110)
CHOLEST SERPL-MCNC: 135 MG/DL (ref 120–199)
CHOLEST/HDLC SERPL: 3.3 {RATIO} (ref 2–5)
CO2 SERPL-SCNC: 25 MMOL/L (ref 23–29)
CREAT SERPL-MCNC: 1.1 MG/DL (ref 0.5–1.4)
CREAT SERPL-MCNC: 1.1 MG/DL (ref 0.5–1.4)
DIFFERENTIAL METHOD BLD: ABNORMAL
EOSINOPHIL # BLD AUTO: 0.3 K/UL (ref 0–0.5)
EOSINOPHIL NFR BLD: 3.8 % (ref 0–8)
ERYTHROCYTE [DISTWIDTH] IN BLOOD BY AUTOMATED COUNT: 12.9 % (ref 11.5–14.5)
EST. GFR  (NO RACE VARIABLE): >60 ML/MIN/1.73 M^2
GLUCOSE SERPL-MCNC: 134 MG/DL (ref 70–110)
HCT VFR BLD AUTO: 40.4 % (ref 40–54)
HDLC SERPL-MCNC: 41 MG/DL (ref 40–75)
HDLC SERPL: 30.4 % (ref 20–50)
HGB BLD-MCNC: 13.5 G/DL (ref 14–18)
IMM GRANULOCYTES # BLD AUTO: 0.02 K/UL (ref 0–0.04)
IMM GRANULOCYTES NFR BLD AUTO: 0.2 % (ref 0–0.5)
INR PPP: 1 (ref 0.8–1.2)
LDLC SERPL CALC-MCNC: 63.4 MG/DL (ref 63–159)
LYMPHOCYTES # BLD AUTO: 1 K/UL (ref 1–4.8)
LYMPHOCYTES NFR BLD: 11.5 % (ref 18–48)
MCH RBC QN AUTO: 29.5 PG (ref 27–31)
MCHC RBC AUTO-ENTMCNC: 33.4 G/DL (ref 32–36)
MCV RBC AUTO: 88 FL (ref 82–98)
MONOCYTES # BLD AUTO: 0.6 K/UL (ref 0.3–1)
MONOCYTES NFR BLD: 6.7 % (ref 4–15)
NEUTROPHILS # BLD AUTO: 6.7 K/UL (ref 1.8–7.7)
NEUTROPHILS NFR BLD: 77.5 % (ref 38–73)
NONHDLC SERPL-MCNC: 94 MG/DL
NRBC BLD-RTO: 0 /100 WBC
PLATELET # BLD AUTO: 267 K/UL (ref 150–450)
PMV BLD AUTO: 9.9 FL (ref 9.2–12.9)
POCT GLUCOSE: 95 MG/DL (ref 70–110)
POTASSIUM SERPL-SCNC: 4.1 MMOL/L (ref 3.5–5.1)
PROT SERPL-MCNC: 7.4 G/DL (ref 6–8.4)
PROTHROMBIN TIME: 11 SEC (ref 9–12.5)
RBC # BLD AUTO: 4.58 M/UL (ref 4.6–6.2)
SAMPLE: NORMAL
SODIUM SERPL-SCNC: 136 MMOL/L (ref 136–145)
TRIGL SERPL-MCNC: 153 MG/DL (ref 30–150)
TROPONIN I SERPL HS-MCNC: 3.3 PG/ML (ref 0–14.9)
TROPONIN I SERPL HS-MCNC: 3.3 PG/ML (ref 0–14.9)
TROPONIN I SERPL HS-MCNC: 4 PG/ML (ref 0–14.9)
TSH SERPL DL<=0.005 MIU/L-ACNC: 2.54 UIU/ML (ref 0.34–5.6)
WBC # BLD AUTO: 8.66 K/UL (ref 3.9–12.7)

## 2025-01-17 PROCEDURE — 82962 GLUCOSE BLOOD TEST: CPT

## 2025-01-17 PROCEDURE — 84443 ASSAY THYROID STIM HORMONE: CPT | Performed by: EMERGENCY MEDICINE

## 2025-01-17 PROCEDURE — 25500020 PHARM REV CODE 255: Performed by: EMERGENCY MEDICINE

## 2025-01-17 PROCEDURE — 99285 EMERGENCY DEPT VISIT HI MDM: CPT | Mod: 25

## 2025-01-17 PROCEDURE — 83880 ASSAY OF NATRIURETIC PEPTIDE: CPT | Performed by: EMERGENCY MEDICINE

## 2025-01-17 PROCEDURE — 93010 ELECTROCARDIOGRAM REPORT: CPT | Mod: ,,, | Performed by: GENERAL PRACTICE

## 2025-01-17 PROCEDURE — 84484 ASSAY OF TROPONIN QUANT: CPT | Performed by: EMERGENCY MEDICINE

## 2025-01-17 PROCEDURE — 80053 COMPREHEN METABOLIC PANEL: CPT | Performed by: EMERGENCY MEDICINE

## 2025-01-17 PROCEDURE — 63600175 PHARM REV CODE 636 W HCPCS: Performed by: EMERGENCY MEDICINE

## 2025-01-17 PROCEDURE — 96374 THER/PROPH/DIAG INJ IV PUSH: CPT

## 2025-01-17 PROCEDURE — 82565 ASSAY OF CREATININE: CPT

## 2025-01-17 PROCEDURE — 85610 PROTHROMBIN TIME: CPT | Performed by: EMERGENCY MEDICINE

## 2025-01-17 PROCEDURE — 85730 THROMBOPLASTIN TIME PARTIAL: CPT | Performed by: EMERGENCY MEDICINE

## 2025-01-17 PROCEDURE — 80061 LIPID PANEL: CPT | Performed by: EMERGENCY MEDICINE

## 2025-01-17 PROCEDURE — 85025 COMPLETE CBC W/AUTO DIFF WBC: CPT | Performed by: EMERGENCY MEDICINE

## 2025-01-17 PROCEDURE — 36415 COLL VENOUS BLD VENIPUNCTURE: CPT | Performed by: EMERGENCY MEDICINE

## 2025-01-17 PROCEDURE — 93005 ELECTROCARDIOGRAM TRACING: CPT | Performed by: GENERAL PRACTICE

## 2025-01-17 RX ORDER — LORAZEPAM 2 MG/ML
1 INJECTION INTRAMUSCULAR
Status: COMPLETED | OUTPATIENT
Start: 2025-01-17 | End: 2025-01-17

## 2025-01-17 RX ADMIN — IOHEXOL 100 ML: 350 INJECTION, SOLUTION INTRAVENOUS at 05:01

## 2025-01-17 RX ADMIN — LORAZEPAM 1 MG: 2 INJECTION, SOLUTION INTRAMUSCULAR; INTRAVENOUS at 08:01

## 2025-01-17 NOTE — ED PROVIDER NOTES
Encounter Date: 2025       History     Chief Complaint   Patient presents with    Extremity Weakness     69-year-old male presented emergency department complaining of generalized weakness and anxiety and fatigue and tremor.  Patient said he was [place] [employer] and started sweating and then felt too weak in the legs and when patient walks he feels like his legs are shaking.  Patient denies any focal weakness.  Denies any chest pain or shortness of breath or abdominal pain.  Denies any dysuria or hematuria or chest pain.  Sweating stopped now and patient however still appears anxious and feels shakiness in the legs but no focal weakness in the legs      Review of patient's allergies indicates:   Allergen Reactions    Lisinopril Other (See Comments)     Causes dizziness.    Influenza virus vaccines      Gets sick with shots     Past Medical History:   Diagnosis Date    Acute bronchitis 2017    Allergy     atorvastatin, Influenza vaccine    Diabetes mellitus     Diabetes mellitus, type 2     Eosinophilic esophagitis 2020    Based upon endoscopy done by Dr. Clark Sprague.    GERD (gastroesophageal reflux disease)     History of endoscopy 2021    Dr. Clark Sprague.  Esophagus appears to have mucosal like eosinophilic esophagitis.  Pathology did not indicate any evidence of malignancy.    Hx of rhinoplasty 10/17/2018    Hyperlipidemia     Hypertension      No past surgical history on file.  Family History   Problem Relation Name Age of Onset    Arthritis Mother      Alzheimer's disease Mother      Cancer Father       Social History     Tobacco Use    Smoking status: Former     Current packs/day: 0.00     Types: Cigarettes     Quit date: 1975     Years since quittin.0    Smokeless tobacco: Never   Substance Use Topics    Alcohol use: No    Drug use: No     Review of Systems    Physical Exam     Initial Vitals [25 1654]   BP Pulse Resp Temp SpO2   (!) 192/80 76 20 97.4 °F  (36.3 °C) 98 %      MAP       --         Physical Exam    ED Course   Procedures  Labs Reviewed   CBC W/ AUTO DIFFERENTIAL - Abnormal       Result Value    WBC 8.66      RBC 4.58 (*)     Hemoglobin 13.5 (*)     Hematocrit 40.4      MCV 88      MCH 29.5      MCHC 33.4      RDW 12.9      Platelets 267      MPV 9.9      Immature Granulocytes 0.2      Gran # (ANC) 6.7      Immature Grans (Abs) 0.02      Lymph # 1.0      Mono # 0.6      Eos # 0.3      Baso # 0.03      nRBC 0      Gran % 77.5 (*)     Lymph % 11.5 (*)     Mono % 6.7      Eosinophil % 3.8      Basophil % 0.3      Differential Method Automated     COMPREHENSIVE METABOLIC PANEL - Abnormal    Sodium 136      Potassium 4.1      Chloride 102      CO2 25      Glucose 134 (*)     BUN 20      Creatinine 1.1      Calcium 9.6      Total Protein 7.4      Albumin 4.8      Total Bilirubin 0.5      Alkaline Phosphatase 34 (*)     AST 21      ALT 17      eGFR >60.0      Anion Gap 9     LIPID PANEL - Abnormal    Cholesterol 135      Triglycerides 153 (*)     HDL 41      LDL Cholesterol 63.4      HDL/Cholesterol Ratio 30.4      Total Cholesterol/HDL Ratio 3.3      Non-HDL Cholesterol 94     PROTIME-INR    Prothrombin Time 11.0      INR 1.0     TSH    TSH 2.540     APTT    aPTT 26.8     TROPONIN I HIGH SENSITIVITY    Troponin I High Sensitivity 3.3     B-TYPE NATRIURETIC PEPTIDE    BNP 20     TROPONIN I HIGH SENSITIVITY    Troponin I High Sensitivity 3.3     B-TYPE NATRIURETIC PEPTIDE    BNP 20     TROPONIN I HIGH SENSITIVITY   POCT GLUCOSE    POCT Glucose 95     ISTAT CREATININE    POC Creatinine 1.1      Sample VENOUS     POCT GLUCOSE MONITORING CONTINUOUS   POCT CREATININE     EKG Readings: (Independently Interpreted)   Initial Reading: No STEMI. Rhythm: Normal Sinus Rhythm. Ectopy: No Ectopy. Conduction: Normal.       Imaging Results              X-Ray Chest AP Portable (Final result)  Result time 01/17/25 18:17:08      Final result by Cipriano Child MD (01/17/25  18:17:08)                   Impression:      No acute findings      Electronically signed by: Cipriano Child  Date:    01/17/2025  Time:    18:17               Narrative:    EXAMINATION:  XR CHEST AP PORTABLE    CLINICAL HISTORY:  CHF;    TECHNIQUE:  Single frontal view of the chest was performed.    COMPARISON:  01/16/2016    FINDINGS:  Lungs are clear. No focal consolidation. No pleural effusion. No pneumothorax. Normal heart size.  Small focus of calcific tendinopathy or bursitis adjacent to the greater tuberosity.                                       CTA Head and Neck (xpd) (Final result)  Result time 01/17/25 17:55:07      Final result by Cipriano Child MD (01/17/25 17:55:07)                   Impression:      No acute abnormality. No high-grade stenosis or major vessel occlusion.      Electronically signed by: Cipriano Child  Date:    01/17/2025  Time:    17:55               Narrative:    EXAMINATION:  CTA HEAD AND NECK (XPD)    CLINICAL HISTORY:  Neuro deficit, acute, stroke suspected;    TECHNIQUE:  CT angiogram was performed from the level of the cirilo to the top of the head following the IV administration of 100mL of Omnipaque 350.   Sagittal and coronal reconstructions and maximum intensity projection reconstructions were performed. Arterial stenosis percentages are based on NASCET measurement criteria.    COMPARISON:  12/03/2024    FINDINGS:  Non-Vascular Structures of the Neck/Thoracic Inlet (limited evaluation): Normal.    Aorta: No acute findings    Extracranial carotid circulation: No hemodynamically significant stenosis, aneurysmal dilatation, or dissection.    Extracranial vertebral circulation: No hemodynamically significant stenosis, aneurysmal dilatation, or dissection.    Intracranial Arteries: No focal high-grade stenosis, occlusion, or aneurysm.    Venous structures (limited evaluation): Normal.                                       CT HEAD FOR STROKE (Final result)  Result  time 01/17/25 17:13:23      Final result by Faviola Mcgill MD (01/17/25 17:13:23)                   Impression:      No acute intracranial process    Mild periventricular small vessel disease    These findings were called to Dr. Haskins in the emergency department at 17:12      Electronically signed by: Faviola Mcgill  Date:    01/17/2025  Time:    17:13               Narrative:    CLINICAL HISTORY:  ([id number])68 y/o  (1955) M    Neuro deficit, acute, stroke suspected;    TECHNIQUE:  (A#[id number], exam time 1/17/2025 17:07)    CT HEAD FOR STROKE [id number]    Axial CT of the brain without contrast using soft tissue and bone algorithm. None.    CMS MANDATED QUALITY DATA - CT RADIATION - 436    All CT scans at this facility utilize dose modulation, iterative reconstruction, and/or weight based dosing when appropriate to reduce radiation dose to as low as reasonably achievable.    COMPARISON:  12/03/2024    FINDINGS  The ventricles and sulci are normal in size and configuration for age.  There is mild periventricular low attenuation compatible with chronic small vessel disease.    There is no hemorrhage, mass or midline shift.  There are no extra-axial fluid collections.  The gray-white differentiation is maintained.  The cerebellum and brainstem are normal.  The orbits are unremarkable.    The paranasal sinuses and mastoid air cells are clear.                                       Medications   LORazepam injection 1 mg (has no administration in time range)   iohexoL (OMNIPAQUE 350) injection 100 mL (100 mLs Intravenous Given 1/17/25 1716)     Medical Decision Making  69-year-old male with anxiety and agitation.  Patient had an episode when he became diaphoretic and was shaky in the legs.  Patient now is significantly better.  Patient's blood pressure also improved as patient was in the emergency department.  Screening labs and workup done and upon arrival stroke activation done given patient was dizzy and weak.   Patient does not have any focal neurologic deficit.  Workup otherwise unremarkable and patient is feeling better and anxiety resolved with treatment.  As feeling well and neurologically intact discharged with return precautions and instructions and follow-up.  Patient's blood pressure was high when he was feeling bad and could be related to patient feeling the way and patient's blood pressure normalized and now patient is feeling completely normal.  Plan is to discharged with return precautions and instructions and follow-up    Amount and/or Complexity of Data Reviewed  Labs: ordered. Decision-making details documented in ED Course.  Radiology: ordered. Decision-making details documented in ED Course.  ECG/medicine tests: independent interpretation performed. Decision-making details documented in ED Course.    Risk  Prescription drug management.                                      Clinical Impression:  Final diagnoses:  [I10] Hypertension, unspecified type (Primary)  [F41.9] Anxiety  [R53.1] Weakness          ED Disposition Condition    Discharge Stable          ED Prescriptions    None       Follow-up Information       Follow up With Specialties Details Why Contact Info    Gregorio Grijalva MD Internal Medicine In 2 days  [address]  [address]  [facility] [address]  [phone]               Vinay Haskins MD  01/17/25 0753

## 2025-01-18 NOTE — DISCHARGE INSTRUCTIONS
Rest.  Low-salt diet.  Return to emergency department for worsening symptoms or any problems.  Return for any problems and take your home blood pressure medication and follow-up with your primary care provider.

## 2025-01-20 LAB
OHS QRS DURATION: 72 MS
OHS QTC CALCULATION: 396 MS

## 2025-01-23 ENCOUNTER — TELEPHONE (OUTPATIENT)
Dept: FAMILY MEDICINE | Facility: CLINIC | Age: 70
End: 2025-01-23

## 2025-01-23 ENCOUNTER — OFFICE VISIT (OUTPATIENT)
Dept: FAMILY MEDICINE | Facility: CLINIC | Age: 70
End: 2025-01-23
Payer: MEDICARE

## 2025-01-23 VITALS
SYSTOLIC BLOOD PRESSURE: 139 MMHG | BODY MASS INDEX: 27.94 KG/M2 | WEIGHT: 178 LBS | DIASTOLIC BLOOD PRESSURE: 76 MMHG | HEIGHT: 67 IN | HEART RATE: 85 BPM

## 2025-01-23 DIAGNOSIS — I10 ESSENTIAL HYPERTENSION: ICD-10-CM

## 2025-01-23 DIAGNOSIS — R53.1 WEAKNESS: Primary | ICD-10-CM

## 2025-01-23 DIAGNOSIS — E78.2 MIXED DYSLIPIDEMIA: ICD-10-CM

## 2025-01-23 DIAGNOSIS — E11.9 TYPE 2 DIABETES MELLITUS WITHOUT COMPLICATION, WITHOUT LONG-TERM CURRENT USE OF INSULIN: ICD-10-CM

## 2025-01-23 PROCEDURE — 1159F MED LIST DOCD IN RCRD: CPT | Mod: HCNC,CPTII,S$GLB, | Performed by: INTERNAL MEDICINE

## 2025-01-23 PROCEDURE — 99213 OFFICE O/P EST LOW 20 MIN: CPT | Mod: HCNC,S$GLB,, | Performed by: INTERNAL MEDICINE

## 2025-01-23 PROCEDURE — 99999 PR PBB SHADOW E&M-EST. PATIENT-LVL III: CPT | Mod: PBBFAC,HCNC,, | Performed by: INTERNAL MEDICINE

## 2025-01-23 PROCEDURE — 1126F AMNT PAIN NOTED NONE PRSNT: CPT | Mod: HCNC,CPTII,S$GLB, | Performed by: INTERNAL MEDICINE

## 2025-01-23 PROCEDURE — 3288F FALL RISK ASSESSMENT DOCD: CPT | Mod: HCNC,CPTII,S$GLB, | Performed by: INTERNAL MEDICINE

## 2025-01-23 PROCEDURE — 3075F SYST BP GE 130 - 139MM HG: CPT | Mod: HCNC,CPTII,S$GLB, | Performed by: INTERNAL MEDICINE

## 2025-01-23 PROCEDURE — 1101F PT FALLS ASSESS-DOCD LE1/YR: CPT | Mod: HCNC,CPTII,S$GLB, | Performed by: INTERNAL MEDICINE

## 2025-01-23 PROCEDURE — 1160F RVW MEDS BY RX/DR IN RCRD: CPT | Mod: HCNC,CPTII,S$GLB, | Performed by: INTERNAL MEDICINE

## 2025-01-23 PROCEDURE — 3078F DIAST BP <80 MM HG: CPT | Mod: HCNC,CPTII,S$GLB, | Performed by: INTERNAL MEDICINE

## 2025-01-23 PROCEDURE — 3008F BODY MASS INDEX DOCD: CPT | Mod: HCNC,CPTII,S$GLB, | Performed by: INTERNAL MEDICINE

## 2025-01-23 NOTE — TELEPHONE ENCOUNTER
----- Message from Med Assistant Deanna sent at 1/23/2025 12:53 PM CST -----  Patient states he was in [facility] last week and he would like to see Dr Valentine oliveros - next available to schedule wasn't until March.     [phone]

## 2025-01-23 NOTE — PROGRESS NOTES
Subjective:       Patient ID: Yan Hernandez Jr. is a 69 y.o. male.    Chief Complaint: Follow-up (ED fup ), Diabetes, Hyperlipidemia, and Hypertension    History of Present Illness    CHIEF COMPLAINT:  Yan presents for follow-up after a recent emergency room visit for weakness, fatigue, and tremors.    HPI:  Yan reports a recent emergency room visit due to sudden onset of weakness, fatigue, and tremors. He had difficulty walking normally with significantly reduced mobility. The episode occurred after eating at a restaurant [facility]. He had diaphoresis, sharp pain in both arms, paresthesia of the lips, and epigastric discomfort. His legs had radiating sensations when attempting to stand, requiring the use of a wheelchair in the emergency department.    This happened perhaps within an hour or so of consuming the meals.  There was no nausea, vomiting or diarrhea.  No abdominal cramps of significant nature.    During the ER visit, the patient underwent various tests including an EKG, CT of the head, and labs. His blood pressure was elevated at 192 systolic on arrival. A neurologist was consulted, and tests for stroke or cerebrovascular occlusion were negative.    Yan has had some GI discomfort over the past couple of nights, but denies having diarrhea the day after the incident. He discontinued Glipizide a few days before the ER visit due to abdominal pain and other adverse effects.    Yan wants to schedule an appointment with a gastroenterologist for an esophageal dilation procedure and possibly a colonoscopy. His last colonoscopy was performed by Dr. Clark Chadwick in 2016.    Yan denies alcohol abuse, medication non-compliance, or overdose. He also denies consuming raw foods like oysters during the meal preceding his symptoms.    MEDICATIONS:  Yan previously took Glipizide but experienced adverse effects including abdominal pain.    MEDICAL HISTORY:  Yan has a history of hypertension and  diabetes.    SURGICAL HISTORY:  Yan underwent an esophageal dilation in  to aid with food passage. In the same year, he also had a colonoscopy for screening purposes.    TEST RESULTS:  Yan's glucose level today is 129, while yesterday it was 115. During an emergency room visit, his labs showed a glucose level of 134, BUN of 20, creatinine of 1.1, and troponin of 4, which was noted as normal. Yan underwent an EKG on  (date inferred from ER visit).    IMAGING:  A CT of the head was performed on  (date inferred from ER visit), which was negative for stroke or cerebrovascular occlusion.    SOCIAL HISTORY:  Yan occasionally drinks beer with seafood. He reports drinking more frequently 50 years ago when he was in his 20s.      ROS:  General: +fatigue  Gastrointestinal: -diarrhea  Neurological: +weakness         Past Medical History:   Diagnosis Date    Acute bronchitis 2017    Allergy     atorvastatin, Influenza vaccine    Diabetes mellitus     Diabetes mellitus, type 2     Eosinophilic esophagitis 2020    Based upon endoscopy done by Dr. Clark Sprague.    GERD (gastroesophageal reflux disease)     History of endoscopy 2021    Dr. Clark Sprague.  Esophagus appears to have mucosal like eosinophilic esophagitis.  Pathology did not indicate any evidence of malignancy.    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     Employer: [employer]   Tobacco Use    Smoking status: Former     Current packs/day: 0.00     Types: Cigarettes     Quit date: 1975     Years since quittin.0    Smokeless tobacco: Never   Substance and Sexual Activity    Alcohol use: No    Drug use: No    Sexual activity: Not Currently     Social Drivers of Health     Financial Resource Strain: Low Risk  (2022)    Overall  "Financial Resource Strain (CARDIA)     Difficulty of Paying Living Expenses: Not very hard   Food Insecurity: No Food Insecurity (7/16/2022)    Hunger Vital Sign     Worried About Running Out of Food in the Last Year: Never true     Ran Out of Food in the Last Year: Never true   Transportation Needs: No Transportation Needs (7/16/2022)    PRAPARE - Transportation     Lack of Transportation (Medical): No     Lack of Transportation (Non-Medical): No   Physical Activity: Inactive (7/16/2022)    Exercise Vital Sign     Days of Exercise per Week: 0 days     Minutes of Exercise per Session: 0 min   Stress: Stress Concern Present (7/16/2022)    [employer] [place] [employer] - Occupational Stress Questionnaire     Feeling of Stress : To some extent   Housing Stability: Low Risk  (7/16/2022)    Housing Stability Vital Sign     Unable to Pay for Housing in the Last Year: No     Number of Places Lived in the Last Year: 1     Unstable Housing in the Last Year: No     History reviewed. No pertinent surgical history.  Family History   Problem Relation Name Age of Onset    Arthritis Mother      Alzheimer's disease Mother      Cancer Father         Objective:      Physical Exam  Blood pressure 139/76, pulse 85, height 5' 7" (1.702 m), weight 80.7 kg (178 lb). Body mass index is 27.88 kg/m².  Physical Exam    Vitals: BP: ___. Hypertensive.  General: No acute distress. Well-developed. Well-nourished.  Eyes: EOMI. Sclerae anicteric.  HENT: Normocephalic. Atraumatic. Nares patent. Moist oral mucosa.    Cardiovascular: Regular rate. Regular rhythm. No murmurs. No rubs. No gallops. Normal S1, S2.  Respiratory: Normal respiratory effort. Clear to auscultation bilaterally. No rales. No rhonchi. No wheezing.  Abdomen: Soft. Non-tender. Non-distended. Normoactive bowel sounds.  Musculoskeletal: No  obvious deformity.  Extremities: No lower extremity edema.  Neurological: Alert & oriented . No slurred speech. Normal " gait.  Psychiatric:  Jovial and intermittently upbeat  Skin: Warm. Dry. No rash.              Assessment:       Admission on 01/17/2025, Discharged on 01/17/2025   Component Date Value Ref Range Status    WBC 01/17/2025 8.66  3.90 - 12.70 K/uL Final    RBC 01/17/2025 4.58 (L)  4.60 - 6.20 M/uL Final    Hemoglobin 01/17/2025 13.5 (L)  14.0 - 18.0 g/dL Final    Hematocrit 01/17/2025 40.4  40.0 - 54.0 % Final    MCV 01/17/2025 88  82 - 98 fL Final    MCH 01/17/2025 29.5  27.0 - 31.0 pg Final    MCHC 01/17/2025 33.4  32.0 - 36.0 g/dL Final    RDW 01/17/2025 12.9  11.5 - 14.5 % Final    Platelets 01/17/2025 267  150 - 450 K/uL Final    MPV 01/17/2025 9.9  9.2 - 12.9 fL Final    Immature Granulocytes 01/17/2025 0.2  0.0 - 0.5 % Final    Gran # (ANC) 01/17/2025 6.7  1.8 - 7.7 K/uL Final    Immature Grans (Abs) 01/17/2025 0.02  0.00 - 0.04 K/uL Final    Lymph # 01/17/2025 1.0  1.0 - 4.8 K/uL Final    Mono # 01/17/2025 0.6  0.3 - 1.0 K/uL Final    Eos # 01/17/2025 0.3  0.0 - 0.5 K/uL Final    Baso # 01/17/2025 0.03  0.00 - 0.20 K/uL Final    nRBC 01/17/2025 0  0 /100 WBC Final    Gran % 01/17/2025 77.5 (H)  38.0 - 73.0 % Final    Lymph % 01/17/2025 11.5 (L)  18.0 - 48.0 % Final    Mono % 01/17/2025 6.7  4.0 - 15.0 % Final    Eosinophil % 01/17/2025 3.8  0.0 - 8.0 % Final    Basophil % 01/17/2025 0.3  0.0 - 1.9 % Final    Differential Method 01/17/2025 Automated   Final    Sodium 01/17/2025 136  136 - 145 mmol/L Final    Potassium 01/17/2025 4.1  3.5 - 5.1 mmol/L Final    Chloride 01/17/2025 102  95 - 110 mmol/L Final    CO2 01/17/2025 25  23 - 29 mmol/L Final    Glucose 01/17/2025 134 (H)  70 - 110 mg/dL Final    BUN 01/17/2025 20  8 - 23 mg/dL Final    Creatinine 01/17/2025 1.1  0.5 - 1.4 mg/dL Final    Calcium 01/17/2025 9.6  8.7 - 10.5 mg/dL Final    Total Protein 01/17/2025 7.4  6.0 - 8.4 g/dL Final    Albumin 01/17/2025 4.8  3.5 - 5.2 g/dL Final    Total Bilirubin 01/17/2025 0.5  0.1 - 1.0 mg/dL Final    Alkaline  Phosphatase 01/17/2025 34 (L)  55 - 135 U/L Final    AST 01/17/2025 21  10 - 40 U/L Final    ALT 01/17/2025 17  10 - 44 U/L Final    eGFR 01/17/2025 >60.0  >60 mL/min/1.73 m^2 Final    Anion Gap 01/17/2025 9  8 - 16 mmol/L Final    Prothrombin Time 01/17/2025 11.0  9.0 - 12.5 sec Final    INR 01/17/2025 1.0  0.8 - 1.2 Final    TSH 01/17/2025 2.540  0.340 - 5.600 uIU/mL Final    QRS Duration 01/17/2025 72  ms Final    [facility] QTC Calculation 01/17/2025 396  ms Final    Cholesterol 01/17/2025 135  120 - 199 mg/dL Final    Triglycerides 01/17/2025 153 (H)  30 - 150 mg/dL Final    HDL 01/17/2025 41  40 - 75 mg/dL Final    LDL Cholesterol 01/17/2025 63.4  63.0 - 159.0 mg/dL Final    HDL/Cholesterol Ratio 01/17/2025 30.4  20.0 - 50.0 % Final    Total Cholesterol/HDL Ratio 01/17/2025 3.3  2.0 - 5.0 Final    Non-HDL Cholesterol 01/17/2025 94  mg/dL Final    aPTT 01/17/2025 26.8  21.0 - 32.0 sec Final    Troponin I High Sensitivity 01/17/2025 3.3  0.0 - 14.9 pg/mL Final    BNP 01/17/2025 20  0 - 99 pg/mL Final    Troponin I High Sensitivity 01/17/2025 3.3  0.0 - 14.9 pg/mL Final    BNP 01/17/2025 20  0 - 99 pg/mL Final    POCT Glucose 01/17/2025 95  70 - 110 mg/dL Final    POC Creatinine 01/17/2025 1.1  0.5 - 1.4 mg/dL Final    Sample 01/17/2025 VENOUS   Final    Troponin I High Sensitivity 01/17/2025 4.0  0.0 - 14.9 pg/mL Final   Admission on 12/03/2024, Discharged on 12/03/2024   Component Date Value Ref Range Status    Hepatitis C Ab 12/03/2024 Negative  Negative Final    HIV 1/2 Ag/Ab 12/03/2024 Negative  Negative Final    QRS Duration 12/03/2024 86  ms Final    [facility] QTC Calculation 12/03/2024 386  ms Final    WBC 12/03/2024 4.85  3.90 - 12.70 K/uL Final    RBC 12/03/2024 4.36 (L)  4.60 - 6.20 M/uL Final    Hemoglobin 12/03/2024 13.3 (L)  14.0 - 18.0 g/dL Final    Hematocrit 12/03/2024 38.8 (L)  40.0 - 54.0 % Final    MCV 12/03/2024 89  82 - 98 fL Final    MCH 12/03/2024 30.5  27.0 - 31.0 pg Final    MCHC 12/03/2024 34.3   32.0 - 36.0 g/dL Final    RDW 12/03/2024 12.8  11.5 - 14.5 % Final    Platelets 12/03/2024 224  150 - 450 K/uL Final    MPV 12/03/2024 10.1  9.2 - 12.9 fL Final    Immature Granulocytes 12/03/2024 0.4  0.0 - 0.5 % Final    Gran # (ANC) 12/03/2024 3.1  1.8 - 7.7 K/uL Final    Immature Grans (Abs) 12/03/2024 0.02  0.00 - 0.04 K/uL Final    Lymph # 12/03/2024 1.1  1.0 - 4.8 K/uL Final    Mono # 12/03/2024 0.4  0.3 - 1.0 K/uL Final    Eos # 12/03/2024 0.3  0.0 - 0.5 K/uL Final    Baso # 12/03/2024 0.03  0.00 - 0.20 K/uL Final    nRBC 12/03/2024 0  0 /100 WBC Final    Gran % 12/03/2024 62.9  38.0 - 73.0 % Final    Lymph % 12/03/2024 21.9  18.0 - 48.0 % Final    Mono % 12/03/2024 7.8  4.0 - 15.0 % Final    Eosinophil % 12/03/2024 6.4  0.0 - 8.0 % Final    Basophil % 12/03/2024 0.6  0.0 - 1.9 % Final    Differential Method 12/03/2024 Automated   Final    Sodium 12/03/2024 136  136 - 145 mmol/L Final    Potassium 12/03/2024 4.0  3.5 - 5.1 mmol/L Final    Chloride 12/03/2024 106  95 - 110 mmol/L Final    CO2 12/03/2024 27  23 - 29 mmol/L Final    Glucose 12/03/2024 110  70 - 110 mg/dL Final    BUN 12/03/2024 14  8 - 23 mg/dL Final    Creatinine 12/03/2024 1.0  0.5 - 1.4 mg/dL Final    Calcium 12/03/2024 9.4  8.7 - 10.5 mg/dL Final    Total Protein 12/03/2024 7.2  6.0 - 8.4 g/dL Final    Albumin 12/03/2024 4.6  3.5 - 5.2 g/dL Final    Total Bilirubin 12/03/2024 0.5  0.1 - 1.0 mg/dL Final    Alkaline Phosphatase 12/03/2024 34 (L)  55 - 135 U/L Final    AST 12/03/2024 21  10 - 40 U/L Final    ALT 12/03/2024 20  10 - 44 U/L Final    eGFR 12/03/2024 >60.0  >60 mL/min/1.73 m^2 Final    Anion Gap 12/03/2024 3 (L)  8 - 16 mmol/L Final    Specimen UA 12/03/2024 Urine, Clean Catch   Final    Color, UA 12/03/2024 Colorless (A)  Yellow, Straw, Nina Final    Appearance, UA 12/03/2024 Clear  Clear Final    pH, UA 12/03/2024 7.0  5.0 - 8.0 Final    Specific Gravity, UA 12/03/2024 <1.005 (A)  1.005 - 1.030 Final    Protein, UA 12/03/2024  Negative  Negative Final    Glucose, UA 12/03/2024 Negative  Negative Final    Ketones, UA 12/03/2024 Negative  Negative Final    Bilirubin (UA) 12/03/2024 Negative  Negative Final    Occult Blood UA 12/03/2024 Negative  Negative Final    Nitrite, UA 12/03/2024 Negative  Negative Final    Urobilinogen, UA 12/03/2024 Negative  Negative EU/dL Final    Leukocytes, UA 12/03/2024 Negative  Negative Final    POCT Glucose 12/03/2024 98  70 - 110 mg/dL Final    QRS Duration 12/03/2024 84  ms Final    [facility]HS QTC Calculation 12/03/2024 403  ms Final       Assessment & Plan    IMPRESSION:  - Assessed patient's recent ER visit for weakness, non-specific symptoms, and elevated blood pressure (190 systolic)  - Noted neurological and cardiac workups were unremarkable  - Considered possible food-related issue but unable to confirm food allergy, intolerance, or poisoning  -not sure if hypoglycemic episode but unlikely following a meal.  He did report intolerance to glipizide.  - Will monitor for symptoms of dizziness, lightheadedness, or chest pain over next few weeks  - May consider cardiac workup if symptoms persist  - Acknowledged patient's decision to discontinue Glipizide due to side effects  - Reviewed last colonoscopy from 2016 by Dr. Clark Chadwick; due for repeat in 2026    E11.9 TYPE 2 DIABETES MELLITUS WITHOUT COMPLICATIONS:  - Discontinued Glipizide due to patient-reported adverse effects including stomach aches.  - Ordered labs to be completed before the next follow-up visit.  - Monitored the patient's glucose levels, with recent readings of 129 and 115.  - Noted a glucose level of 134 during a recent emergency room visit.  - Assessed that the patient's glucose levels are fluctuating but not critically high.  - Acknowledged the patient's decision to discontinue Glipizide due to side effects.  - Advised the patient to monitor diet closely after discontinuing Glipizide.  - Scheduled a follow-up visit for April. I10  ESSENTIAL (PRIMARY) HYPERTENSION:  - Noted a high blood pressure reading of 192 during a recent emergency room visit.  - Acknowledged the potential consequences of the elevated blood pressure reading.  - Planned to continue monitoring for symptoms related to hypertension.  - Continued the patient's current antihypertensive medication regimen.    Z98.61 CORONARY ANGIOPLASTY STATUS:  - Verified that the troponin level was checked during the emergency room visit and was normal at 4.    Z87.19 PERSONAL HISTORY OF OTHER DISEASES OF THE DIGESTIVE SYSTEM:  - Noted the patient's mention of a past procedure for esophageal dilation.  - Advised the patient to schedule an appointment with a gastroenterologist for esophageal dilation and colonoscopy.    Z86.0100 PERSONAL HISTORY OF COLON POLYPS, UNSPECIFIED:  - Noted that the patient's last colonoscopy was in 2016, with a recommendation for repeat in 10 years.  - Advised the patient to consult with a gastroenterologist regarding the scheduling of the next colonoscopy.    K21.9 GASTRO-ESOPHAGEAL REFLUX DISEASE WITHOUT ESOPHAGITIS:  - Instructed the patient to monitor diet and identify potential food triggers for symptoms.  - Advised the patient to contact the office if any potential triggers for symptoms are identified.  - Noted the patient's report of recent stomach rumbling.  - Recommend the patient schedule an appointment for esophageal dilation.    Z09 FOLLOW-UP:  - Scheduled a follow-up visit for April.  - Noted the patient's recent emergency room visit with various tests performed.  - Assessed that the emergency room workup was essentially unremarkable.  - Planned to continue monitoring for symptoms following the emergency room visit.  - Ordered labs to be completed before the next visit.         Plan:   Weakness    Type 2 diabetes mellitus without complication, without long-term current use of insulin    Essential hypertension    Mixed hyperlipidemia      Patient's visit  to the emergency department's on January 17th with weakness and some nonspecific symptoms has been noted.  He had neurological workup and cardiac workup which was essentially unremarkable.  His blood pressures were elevated to 190 systolic.      He had gone to a restaurant but I can not get any clear-cut history if it has been food allergy, intolerance or food poisoning.  There could be a lot of speculation about conditions like botulism, E coli infection or Salmonella.    He does not drink too much alcohol and there was no suspicion that anybody was trying to poison him either.  I would simply continue to watch for the next few weeks and see if he has any symptoms of dizziness or lightheadedness or chest pains following which we may consider cardiac workup.    He has a his labs due for next visit he will do the labs before the follow-up.    Otherwise he is doing okay on his medications.  Follow up in about 3 months (around 4/16/2025) for Diabetes/HTN/Lipids.      Current Outpatient Medications:     aspirin (ECOTRIN) 81 MG EC tablet, Take 1 tablet (81 mg total) by mouth once daily., Disp: 100 tablet, Rfl: 4    fenofibrate 160 MG Tab, Take 1 tablet (160 mg total) by mouth every evening., Disp: 90 tablet, Rfl: 2    losartan (COZAAR) 50 MG tablet, Take 1 tablet by mouth once daily, Disp: 30 tablet, Rfl: 8    metFORMIN (GLUCOPHAGE) 1000 MG tablet, TAKE 1 TABLET BY MOUTH TWICE DAILY WITH MEALS, Disp: 180 tablet, Rfl: 1    paroxetine (PAXIL-CR) 25 MG 24 hr tablet, Take 1 tablet (25 mg total) by mouth once daily., Disp: 90 tablet, Rfl: 1    rosuvastatin (CRESTOR) 20 MG tablet, Take 20 mg by mouth once daily., Disp: , Rfl:     This note was generated with the assistance of ambient listening technology. Verbal consent was obtained by the patient and accompanying visitor(s) for the recording of patient appointment to facilitate this note. I attest to having reviewed and edited the generated note for accuracy, though some  syntax or spelling errors may persist. Please contact the author of this note for any clarification.      Gregorio Grijalva    For the sake of theoretical discussion:-however these are pure speculation with no sequelae or confirmation of below.  The fact that it immediately after or within half an hour of having a meal negates such possibilities.    Several types of food poisoning can cause weakness in the legs and body, including:  Botulism  Caused by Clostridium botulinum, botulism can lead to muscle weakness, paralysis, and respiratory failure. Symptoms include blurred vision, sensitivity to light, double vision, and eventually paralysis that spreads downward[4][5].  Shigella and Salmonella Infections  These bacterial infections can cause severe symptoms such as fever, chills, and bloody diarrhea. In some cases, they can lead to systemic weakness and other complications like reactive arthritis or Guillain-Barré syndrome, a nerve disease that can cause muscle weakness[1][4].  E. coli Infection  Certain strains of E. coli, such as those causing hemolytic uremic syndrome (HUS), can result in severe systemic symptoms including weakness due to dehydration, electrolyte imbalance, and potential kidney damage[1][4].  Campylobacter Infection  Campylobacter jejuni can cause symptoms that include fever, abdominal cramps, and diarrhea. In rare cases, it can lead to Guillain-Barré syndrome, which is characterized by muscle weakness and paralysis[4].  These conditions often require immediate medical attention due to their potential severity and complications.

## 2025-01-28 ENCOUNTER — OFFICE VISIT (OUTPATIENT)
Dept: PODIATRY | Facility: CLINIC | Age: 70
End: 2025-01-28
Payer: MEDICARE

## 2025-01-28 VITALS — WEIGHT: 175.5 LBS | BODY MASS INDEX: 27.55 KG/M2 | HEIGHT: 67 IN

## 2025-01-28 DIAGNOSIS — M20.41 HAMMER TOE OF RIGHT FOOT: ICD-10-CM

## 2025-01-28 DIAGNOSIS — L85.1 ACQUIRED KERATODERMA: ICD-10-CM

## 2025-01-28 DIAGNOSIS — M21.619 BUNION: ICD-10-CM

## 2025-01-28 DIAGNOSIS — E11.9 TYPE 2 DIABETES MELLITUS WITHOUT COMPLICATION, WITHOUT LONG-TERM CURRENT USE OF INSULIN: Primary | ICD-10-CM

## 2025-01-28 DIAGNOSIS — E11.9 ENCOUNTER FOR DIABETIC FOOT EXAM: ICD-10-CM

## 2025-01-28 PROCEDURE — 1126F AMNT PAIN NOTED NONE PRSNT: CPT | Mod: HCNC,CPTII,S$GLB, | Performed by: PODIATRIST

## 2025-01-28 PROCEDURE — G2211 COMPLEX E/M VISIT ADD ON: HCPCS | Mod: HCNC,S$GLB,, | Performed by: PODIATRIST

## 2025-01-28 PROCEDURE — 1159F MED LIST DOCD IN RCRD: CPT | Mod: HCNC,CPTII,S$GLB, | Performed by: PODIATRIST

## 2025-01-28 PROCEDURE — 99214 OFFICE O/P EST MOD 30 MIN: CPT | Mod: HCNC,S$GLB,, | Performed by: PODIATRIST

## 2025-01-28 PROCEDURE — 3288F FALL RISK ASSESSMENT DOCD: CPT | Mod: HCNC,CPTII,S$GLB, | Performed by: PODIATRIST

## 2025-01-28 PROCEDURE — 1160F RVW MEDS BY RX/DR IN RCRD: CPT | Mod: HCNC,CPTII,S$GLB, | Performed by: PODIATRIST

## 2025-01-28 PROCEDURE — 99999 PR PBB SHADOW E&M-EST. PATIENT-LVL III: CPT | Mod: PBBFAC,HCNC,, | Performed by: PODIATRIST

## 2025-01-28 PROCEDURE — 1101F PT FALLS ASSESS-DOCD LE1/YR: CPT | Mod: HCNC,CPTII,S$GLB, | Performed by: PODIATRIST

## 2025-01-28 PROCEDURE — 3008F BODY MASS INDEX DOCD: CPT | Mod: HCNC,CPTII,S$GLB, | Performed by: PODIATRIST

## 2025-01-28 NOTE — PROGRESS NOTES
"    [address] [facility] [address] [place] [address]  Phone: [phone]   [phone]    Patient's PCP:Gregorio Grijalva MD  Referring Provider: No ref. provider found    Subjective:      Chief Complaint:: Diabetic Foot Exam and Diabetes Mellitus    HPI  Yan Hernandez Jr. is a 69 y.o. male who presents today for a diabetic foot exam.  Pt has seen  on 1/23/25 who treats them for their diabetes.  Pt has been a diabetic for about 18 years.  Taking metformin to treat diabetes.    Blood sugar: 127  Hemoglobin A1C: 7.1        Vitals:    01/28/25 0923   Weight: 79.6 kg (175 lb 7.8 oz)   Height: 5' 7" (1.702 m)   PainSc: 0-No pain      Shoe Size: 10    History reviewed. No pertinent surgical history.  Past Medical History:   Diagnosis Date    Acute bronchitis 6/1/2017    Allergy     atorvastatin, Influenza vaccine    Diabetes mellitus     Diabetes mellitus, type 2     Eosinophilic esophagitis 2/18/2020    Based upon endoscopy done by Dr. Clark Sprague.    GERD (gastroesophageal reflux disease)     History of endoscopy 4/6/2021    Dr. Clark Sprague.  Esophagus appears to have mucosal like eosinophilic esophagitis.  Pathology did not indicate any evidence of malignancy.    Hx of rhinoplasty 10/17/2018    Hyperlipidemia     Hypertension      Family History   Problem Relation Name Age of Onset    Arthritis Mother      Alzheimer's disease Mother      Cancer Father          Social History:   Marital Status: Single  Alcohol History:  reports no history of alcohol use.  Tobacco History:  reports that he quit smoking about 50 years ago. His smoking use included cigarettes. He has never used smokeless tobacco.  Drug History:  reports no history of drug use.    Review of patient's allergies indicates:   Allergen Reactions    Lisinopril Other (See Comments)     Causes dizziness.    Glipizide Other (See Comments)     Stomach ache    Influenza virus vaccines      Gets sick with shots       Current Outpatient Medications "   Medication Sig Dispense Refill    aspirin (ECOTRIN) 81 MG EC tablet Take 1 tablet (81 mg total) by mouth once daily. 100 tablet 4    fenofibrate 160 MG Tab Take 1 tablet (160 mg total) by mouth every evening. 90 tablet 2    losartan (COZAAR) 50 MG tablet Take 1 tablet by mouth once daily 30 tablet 8    metFORMIN (GLUCOPHAGE) 1000 MG tablet TAKE 1 TABLET BY MOUTH TWICE DAILY WITH MEALS 180 tablet 1    paroxetine (PAXIL-CR) 25 MG 24 hr tablet Take 1 tablet (25 mg total) by mouth once daily. 90 tablet 1    rosuvastatin (CRESTOR) 20 MG tablet Take 20 mg by mouth once daily.       No current facility-administered medications for this visit.       Review of Systems   Constitutional:  Negative for chills, fatigue, fever and unexpected weight change.   HENT:  Negative for hearing loss and trouble swallowing.    Eyes:  Negative for photophobia and visual disturbance.   Respiratory:  Negative for cough, shortness of breath and wheezing.    Cardiovascular:  Negative for chest pain, palpitations and leg swelling.   Gastrointestinal:  Negative for abdominal pain and nausea.   Genitourinary:  Negative for dysuria and frequency.   Musculoskeletal:  Negative for arthralgias, back pain, gait problem, joint swelling, myalgias and neck pain.   Skin:  Negative for rash and wound.   Neurological:  Negative for tremors, seizures, weakness, numbness and headaches.   Hematological:  Does not bruise/bleed easily.         Objective:        Physical Exam:   Foot Exam    General  General Appearance: appears stated age and healthy   Orientation: alert and oriented to person, place, and time   Affect: appropriate   Gait: unimpaired       Right Foot/Ankle     Inspection and Palpation  Ecchymosis: none  Tenderness: none   Swelling: none   Arch: normal  Hammertoes: second toe, third toe, fourth toe and fifth toe  Claw Toes: absent  Hallux valgus: yes (Mild deformity)  Hallux limitus: yes (Mild deformity)  Skin Exam: callus;    Neurovascular  Dorsalis pedis: 2+  Posterior tibial: 2+  Capillary Refill: 2+  Saphenous nerve sensation: normal  Tibial nerve sensation: normal  Superficial peroneal nerve sensation: normal  Deep peroneal nerve sensation: normal  Sural nerve sensation: normal    Muscle Strength  Ankle dorsiflexion: 5  Ankle plantar flexion: 5  Ankle inversion: 5  Ankle eversion: 5  Great toe extension: 5  Great toe flexion: 5    Range of Motion    Normal right ankle ROM  Passive  1st MTP extension: 30    Active  1st MTP extension: 30      Left Foot/Ankle      Inspection and Palpation  Ecchymosis: none  Tenderness: none   Swelling: none   Arch: normal  Hammertoes: second toe, third toe, fourth toe and fifth toe  Claw toes: absent  Hallux valgus: yes (Mild deformity)  Hallux limitus: yes (Mild deformity)  Skin Exam: callus;   Neurovascular  Dorsalis pedis: 2+  Posterior tibial: 2+  Capillary refill: 2+  Saphenous nerve sensation: normal  Tibial nerve sensation: normal  Superficial peroneal nerve sensation: normal  Deep peroneal nerve sensation: normal  Sural nerve sensation: normal    Muscle Strength  Ankle dorsiflexion: 5  Ankle plantar flexion: 5  Ankle inversion: 5  Ankle eversion: 5  Great toe extension: 5  Great toe flexion: 5    Range of Motion    Normal left ankle ROM  Passive  1st MTP extension: 30    Active  1st MTP extension: 30        Physical Exam  Cardiovascular:      Pulses:           Dorsalis pedis pulses are 2+ on the right side and 2+ on the left side.        Posterior tibial pulses are 2+ on the right side and 2+ on the left side.   Musculoskeletal:      Right foot: Bunion (Mild deformity) present.      Left foot: Bunion (Mild deformity) present.   Feet:      Right foot:      Skin integrity: Callus present.      Left foot:      Skin integrity: Callus present.         Imaging: none            Assessment:       1. Type 2 diabetes mellitus without complication, without long-term current use of insulin    2. Hammer  toe of right foot    3. Acquired keratoderma    4. Bunion    5. Encounter for diabetic foot exam      Plan:   Type 2 diabetes mellitus without complication, without long-term current use of insulin    Hammer toe of right foot    Acquired keratoderma    Bunion    Encounter for diabetic foot exam      Follow up in about 1 year (around 1/28/2026), or if symptoms worsen or fail to improve.    Procedures        Counseled patient on the aspects of diabetes and how it pertains to the feet.  I explained the importance of proper diabetic foot care and how it is essential for the health of their feet.    I discussed the importance of knowing their HGA1c and that the level needs to be as close to 6 as possible.  I discussed the increase complications of high blood sugar including stroke, blindness, heart attack, kidney failure and loss of limb secondary to neuropathy and PVD.    Patient  was made aware of inspecting their feet.  Patient was told to be aware of any breaks in the skin or redness.  With neuropathy, these areas are not recognized early due to the numbness.  I discussed different treatments available to control the symptoms but whcih may not cure the problem.      Shoe inspection. Patient instructed on proper foot hygeine. We discussed wearing proper shoe gear, daily foot inspections, never walking without protective shoe gear, never putting sharp instruments to feet.      Counseling:     I provided patient education verbally regarding:   Patient diagnosis, treatment options, as well as alternatives, risks, and benefits.     This note was created using Dragon voice recognition software that occasionally misinterpreted phrases or words.

## 2025-01-28 NOTE — PATIENT INSTRUCTIONS
Your Diabetes Foot Care Program    Every day you depend on your feet to keep you moving. But when you have diabetes, your feet need special care. Even a small foot problem can become very serious. So dont take your feet for granted. By working with your diabetes healthcare team, you can learn how to protect your feet and keep them healthy.  Evaluating your feet  An evaluation helps your healthcare provider check the condition of your feet. The evaluation includes a review of your diabetes history and overall health. It may also include a foot exam, X-rays, or other tests. These can help show problems beneath the skin that you cant see or feel.  Medical history  You will be asked about your overall health and any history of foot problems. Youll also discuss your diabetes history, such as whether your blood sugar level has changed over time. It also includes questions about sensations of pain, tingling, pins and needles, or numbness. Your healthcare provider will also want to know if you have high blood pressure and heart disease, or if you smoke. Be sure to mention any medicines (including over-the-counter), supplements, or herbal remedies you take.  Foot exam  A foot exam checks the condition of different parts of your foot. First, your skin and nails are examined for any signs of infection. Blood flow is checked by feeling for the pulses in each foot. You may also have tests to study the nerves in the foot. These include using a small filament (wire) to see how sensitive your feet are. In certain cases, you will be asked to walk a short distance to check for bone, joint, and muscle problems.  Diagnostic tests  If needed, your healthcare provider will suggest certain tests to learn more about your feet. These include:  Doppler tests to measure blood flow in the feet and lower leg.  X-rays, which can show bone or joint problems.  Other imaging tests, such as an MRI (magnetic resonance imaging), bone scan, and CT  (computed tomography) scan. These can help show bone infections.  Other tests, such as vascular tests, which study the blood flow in your feet and legs. You may also have nerve studies to learn how sensitive your feet are.  Creating a foot care program  Based on the evaluation, your healthcare provider will create a foot care program for you. Your program may be as simple as starting a daily self-care routine and changing the types of shoes your wear. It may also involve treating minor foot problems, such as a corn or blister. In some cases, surgery will be needed to treat an infection or mechanical problems, such as hammer toes.  Preventing problems  When you have diabetes, its easier to prevent problems than to treat them later on. So see your healthcare team for regular checkups and foot care. Your healthcare team can also help you learn more about caring for your feet at home. For example, you may be told to avoid walking barefoot. Or you may be told that special footwear is needed to protect your feet.  Have regular checkups  Foot problems can develop quickly. So be sure to follow your healthcare teams schedule for regular checkups. During office visits, take off your shoes and socks as soon as you get in the exam room. Ask your healthcare provider to examine your feet for problems. This will make it easier to find and treat small skin irritations before they get worse. Regular checkups can also help keep track of the blood flow and feeling in your feet. If you have neuropathy (lack of feeling in your feet), you will need to have checkups more often.  Learn about self-care  The more you know about diabetes and your feet, the easier it will be to prevent problems. Members of your healthcare team can teach you how to inspect your feet and teach you to look for warning signs. They can also give you other foot care tips. During office visits, be sure to ask any questions you have.  Date Last Reviewed: 7/1/2016  ©  7831-5284 The [employer] ([employer]). [address] [place] [address], [place] [address]. All rights reserved. This information is not intended as a substitute for professional medical care. Always follow your healthcare professional's instructions.

## 2025-02-21 DIAGNOSIS — Z00.00 ENCOUNTER FOR MEDICARE ANNUAL WELLNESS EXAM: ICD-10-CM

## 2025-05-09 NOTE — PATIENT INSTRUCTIONS
What is a Corn? What is a Callus?    Corns and calluses are areas of thickened skin that develop to protect that area from irritation. They occur when something rubs against the foot repeatedly or causes excess pressure against part of the foot. The term callus commonly is used if the thickening of skin occurs on the bottom of the foot, and if thickening occurs on the top of the foot (or toe), it's called a corn. However, the location of the thickened skin is less important than the pattern of thickening: flat, widespread skin thickening indicates a callus, and skin lesions that are thicker or deeper indicate a corn.    Corns and calluses are not contagious but may become painful if they get too thick. In people with diabetes or decreased circulation, they can lead to more serious foot problems.      Causes  Corns often occur where a toe rubs against the interior of a shoe. Excessive pressure at the balls of the feet--common in women who regularly wear high heels--may cause calluses to develop on the balls of the feet.    People with certain deformities of the foot, such as hammer toes, are prone to corns and calluses.      Symptoms  Corns and calluses typically have a rough, dull appearance. They may be raised or rounded, and they can be hard to differentiate from warts. Corns or calluses sometimes cause pain.      Home Care  Mild corns and calluses may not require treatment. If the corn or callus isn't bothering you, it can probably be left alone. It's a good idea, though, to investigate possible causes of the corn or callus. If your footwear is contributing to the development of a corn or callus, it's time to look for other shoes.    Over-the-counter treatments can do more harm than good, especially if you have any medical conditions such as diabetes. Some over-the-counter treatments contain harsh chemicals, which can lead to burns or even foot ulcers.       When to Visit a Podiatrist  If corns or calluses are  causing pain and discomfort or inhibiting your daily life in any way, see a podiatrist. Also, people with diabetes, poor circulation, or other serious illnesses should have their feet checked.      Diagnosis and Treatment  The podiatrist will conduct a complete examination of your feet. X-rays may be taken; your podiatrist may also want to inspect your shoes and watch you walk. He or she will also take a complete medical history. Corns and calluses are diagnosed based on appearance and history.    If you have mild corns or calluses, your podiatrist may suggest changing your shoes and/or adding padding to your shoes. Larger corns and calluses are most effectively reduced (made smaller) with a surgical blade. A podiatrist can use the blade to carefully shave away the thickened, dead skin--right in the office. The procedure is painless because the skin is already dead. Additional treatments may be needed if the corn or callus recurs.    Cortisone injections into the foot or toe may be given if the corn or callus is causing significant pain. Surgery may be necessary in cases that do not respond to conservative treatment.      Prevention  Wear properly fitted shoes. If you have any deformities of the toe or foot, talk to your podiatrist to find out what shoes are best for you.  Gel pad inserts may decrease friction points and pressure. Your podiatrist can help you determine where pads might be useful.        Your Diabetes Foot Care Program    Every day you depend on your feet to keep you moving. But when you have diabetes, your feet need special care. Even a small foot problem can become very serious. So dont take your feet for granted. By working with your diabetes healthcare team, you can learn how to protect your feet and keep them healthy.  Evaluating your feet  An evaluation helps your healthcare provider check the condition of your feet. The evaluation includes a review of your diabetes history and overall health.  It may also include a foot exam, X-rays, or other tests. These can help show problems beneath the skin that you cant see or feel.  Medical history  You will be asked about your overall health and any history of foot problems. Youll also discuss your diabetes history, such as whether your blood sugar level has changed over time. It also includes questions about sensations of pain, tingling, pins and needles, or numbness. Your healthcare provider will also want to know if you have high blood pressure and heart disease, or if you smoke. Be sure to mention any medicines (including over-the-counter), supplements, or herbal remedies you take.  Foot exam  A foot exam checks the condition of different parts of your foot. First, your skin and nails are examined for any signs of infection. Blood flow is checked by feeling for the pulses in each foot. You may also have tests to study the nerves in the foot. These include using a small filament (wire) to see how sensitive your feet are. In certain cases, you will be asked to walk a short distance to check for bone, joint, and muscle problems.  Diagnostic tests  If needed, your healthcare provider will suggest certain tests to learn more about your feet. These include:  Doppler tests to measure blood flow in the feet and lower leg.  X-rays, which can show bone or joint problems.  Other imaging tests, such as an MRI (magnetic resonance imaging), bone scan, and CT (computed tomography) scan. These can help show bone infections.  Other tests, such as vascular tests, which study the blood flow in your feet and legs. You may also have nerve studies to learn how sensitive your feet are.  Creating a foot care program  Based on the evaluation, your healthcare provider will create a foot care program for you. Your program may be as simple as starting a daily self-care routine and changing the types of shoes your wear. It may also involve treating minor foot problems, such as a corn or  blister. In some cases, surgery will be needed to treat an infection or mechanical problems, such as hammer toes.  Preventing problems  When you have diabetes, its easier to prevent problems than to treat them later on. So see your healthcare team for regular checkups and foot care. Your healthcare team can also help you learn more about caring for your feet at home. For example, you may be told to avoid walking barefoot. Or you may be told that special footwear is needed to protect your feet.  Have regular checkups  Foot problems can develop quickly. So be sure to follow your healthcare teams schedule for regular checkups. During office visits, take off your shoes and socks as soon as you get in the exam room. Ask your healthcare provider to examine your feet for problems. This will make it easier to find and treat small skin irritations before they get worse. Regular checkups can also help keep track of the blood flow and feeling in your feet. If you have neuropathy (lack of feeling in your feet), you will need to have checkups more often.  Learn about self-care  The more you know about diabetes and your feet, the easier it will be to prevent problems. Members of your healthcare team can teach you how to inspect your feet and teach you to look for warning signs. They can also give you other foot care tips. During office visits, be sure to ask any questions you have.  Date Last Reviewed: 7/1/2016  © 5498-8958 [employer]. [address] [place] [address], [place] [address]. All rights reserved. This information is not intended as a substitute for professional medical care. Always follow your healthcare professional's instructions.

## 2025-05-16 ENCOUNTER — OFFICE VISIT (OUTPATIENT)
Dept: PODIATRY | Facility: CLINIC | Age: 70
End: 2025-05-16
Payer: MEDICARE

## 2025-05-16 ENCOUNTER — PATIENT MESSAGE (OUTPATIENT)
Dept: FAMILY MEDICINE | Facility: CLINIC | Age: 70
End: 2025-05-16
Payer: MEDICARE

## 2025-05-16 VITALS — HEIGHT: 67 IN | WEIGHT: 174.19 LBS | BODY MASS INDEX: 27.34 KG/M2 | HEART RATE: 84 BPM | OXYGEN SATURATION: 97 %

## 2025-05-16 DIAGNOSIS — E11.9 TYPE 2 DIABETES MELLITUS WITHOUT COMPLICATION, WITHOUT LONG-TERM CURRENT USE OF INSULIN: Primary | ICD-10-CM

## 2025-05-16 DIAGNOSIS — L85.1 ACQUIRED KERATODERMA: ICD-10-CM

## 2025-05-16 DIAGNOSIS — M20.41 HAMMER TOE OF RIGHT FOOT: ICD-10-CM

## 2025-05-16 DIAGNOSIS — M21.619 BUNION: ICD-10-CM

## 2025-05-16 DIAGNOSIS — E11.9 ENCOUNTER FOR DIABETIC FOOT EXAM: ICD-10-CM

## 2025-05-16 PROCEDURE — 3008F BODY MASS INDEX DOCD: CPT | Mod: CPTII,HCNC,S$GLB, | Performed by: PODIATRIST

## 2025-05-16 PROCEDURE — 1160F RVW MEDS BY RX/DR IN RCRD: CPT | Mod: CPTII,HCNC,S$GLB, | Performed by: PODIATRIST

## 2025-05-16 PROCEDURE — 4010F ACE/ARB THERAPY RXD/TAKEN: CPT | Mod: CPTII,HCNC,S$GLB, | Performed by: PODIATRIST

## 2025-05-16 PROCEDURE — 1101F PT FALLS ASSESS-DOCD LE1/YR: CPT | Mod: CPTII,HCNC,S$GLB, | Performed by: PODIATRIST

## 2025-05-16 PROCEDURE — 99214 OFFICE O/P EST MOD 30 MIN: CPT | Mod: HCNC,S$GLB,, | Performed by: PODIATRIST

## 2025-05-16 PROCEDURE — 99999 PR PBB SHADOW E&M-EST. PATIENT-LVL III: CPT | Mod: PBBFAC,HCNC,, | Performed by: PODIATRIST

## 2025-05-16 PROCEDURE — 1126F AMNT PAIN NOTED NONE PRSNT: CPT | Mod: CPTII,HCNC,S$GLB, | Performed by: PODIATRIST

## 2025-05-16 PROCEDURE — 3288F FALL RISK ASSESSMENT DOCD: CPT | Mod: CPTII,HCNC,S$GLB, | Performed by: PODIATRIST

## 2025-05-16 PROCEDURE — 1159F MED LIST DOCD IN RCRD: CPT | Mod: CPTII,HCNC,S$GLB, | Performed by: PODIATRIST

## 2025-05-16 PROCEDURE — G2211 COMPLEX E/M VISIT ADD ON: HCPCS | Mod: HCNC,S$GLB,, | Performed by: PODIATRIST

## 2025-05-16 NOTE — PROGRESS NOTES
"    [address] [facility] [address]  [place] [address]  Phone: [phone]   [phone]    Patient's PCP:Gregorio Grijalva MD  Referring Provider: No ref. provider found    Subjective:      Chief Complaint:: Diabetic Foot Exam, Diabetes Mellitus, and Callouses (Callus big toe left )    HPI  Yan Hernandez Jr. is a 70 y.o. male who presents today for a diabetic foot exam.  Pt has seen  on 1/23/25 who treats them for their diabetes.  Pt has been a diabetic for 18 years.  Taking metformin to treat diabetes.  Patient also presents with complaints of thickened calluses bilaterally.  He states that he noticed some discoloration particularly on the left great toe.  Denies any trauma.  Blood sugar: 121  Hemoglobin A1C: 7.1          Vitals:    05/16/25 0952   Pulse: 84   SpO2: 97%   Weight: 79 kg (174 lb 2.6 oz)   Height: 5' 7" (1.702 m)   PainSc: 0-No pain      Shoe Size: 10    History reviewed. No pertinent surgical history.  Past Medical History:   Diagnosis Date    Acute bronchitis 6/1/2017    Allergy     atorvastatin, Influenza vaccine    Diabetes mellitus     Diabetes mellitus, type 2     Eosinophilic esophagitis 2/18/2020    Based upon endoscopy done by Dr. Clark Sprague.    GERD (gastroesophageal reflux disease)     History of endoscopy 4/6/2021    Dr. Clark Sprague.  Esophagus appears to have mucosal like eosinophilic esophagitis.  Pathology did not indicate any evidence of malignancy.    Hx of rhinoplasty 10/17/2018    Hyperlipidemia     Hypertension      Family History   Problem Relation Name Age of Onset    Arthritis Mother      Alzheimer's disease Mother      Cancer Father          Social History:   Marital Status: Single  Alcohol History:  reports no history of alcohol use.  Tobacco History:  reports that he quit smoking about 50 years ago. His smoking use included cigarettes. He has never used smokeless tobacco.  Drug History:  reports no history of drug use.    Review of patient's allergies " indicates:   Allergen Reactions    Lisinopril Other (See Comments)     Causes dizziness.    Glipizide Other (See Comments)     Stomach ache    Influenza virus vaccines      Gets sick with shots       Current Medications[1]    Review of Systems   Constitutional:  Negative for chills, fatigue, fever and unexpected weight change.   HENT:  Negative for hearing loss and trouble swallowing.    Eyes:  Negative for photophobia and visual disturbance.   Respiratory:  Negative for cough, shortness of breath and wheezing.    Cardiovascular:  Negative for chest pain, palpitations and leg swelling.   Gastrointestinal:  Negative for abdominal pain and nausea.   Genitourinary:  Negative for dysuria and frequency.   Musculoskeletal:  Negative for arthralgias, back pain, gait problem, joint swelling, myalgias and neck pain.   Skin:  Negative for rash and wound.   Neurological:  Negative for tremors, seizures, weakness, numbness and headaches.   Hematological:  Does not bruise/bleed easily.         Objective:        Physical Exam:   Foot Exam    General  General Appearance: appears stated age and healthy   Orientation: alert and oriented to person, place, and time   Affect: appropriate   Gait: unimpaired       Right Foot/Ankle     Inspection and Palpation  Ecchymosis: none  Tenderness: none   Swelling: none   Arch: normal  Hammertoes: second toe, third toe, fourth toe and fifth toe  Claw Toes: absent  Hallux valgus: yes (Mild deformity)  Hallux limitus: yes (Mild deformity)  Skin Exam: callus;   Neurovascular  Dorsalis pedis: 2+  Posterior tibial: 2+  Capillary Refill: 2+  Saphenous nerve sensation: normal  Tibial nerve sensation: normal  Superficial peroneal nerve sensation: normal  Deep peroneal nerve sensation: normal  Sural nerve sensation: normal    Muscle Strength  Ankle dorsiflexion: 5  Ankle plantar flexion: 5  Ankle inversion: 5  Ankle eversion: 5  Great toe extension: 5  Great toe flexion: 5    Range of Motion    Normal  right ankle ROM  Passive  1st MTP extension: 30    Active  1st MTP extension: 30      Left Foot/Ankle      Inspection and Palpation  Ecchymosis: none  Tenderness: none   Swelling: none   Arch: normal  Hammertoes: second toe, third toe, fourth toe and fifth toe  Claw toes: absent  Hallux valgus: yes (Mild deformity)  Hallux limitus: yes (Mild deformity)  Skin Exam: callus;   Neurovascular  Dorsalis pedis: 2+  Posterior tibial: 2+  Capillary refill: 2+  Saphenous nerve sensation: normal  Tibial nerve sensation: normal  Superficial peroneal nerve sensation: normal  Deep peroneal nerve sensation: normal  Sural nerve sensation: normal    Muscle Strength  Ankle dorsiflexion: 5  Ankle plantar flexion: 5  Ankle inversion: 5  Ankle eversion: 5  Great toe extension: 5  Great toe flexion: 5    Range of Motion    Normal left ankle ROM  Passive  1st MTP extension: 30    Active  1st MTP extension: 30        Physical Exam  Cardiovascular:      Pulses:           Dorsalis pedis pulses are 2+ on the right side and 2+ on the left side.        Posterior tibial pulses are 2+ on the right side and 2+ on the left side.   Musculoskeletal:      Right foot: Bunion (Mild deformity) present.      Left foot: Bunion (Mild deformity) present.   Feet:      Right foot:      Skin integrity: Callus present.      Left foot:      Skin integrity: Callus present.         Imaging: none            Assessment:       1. Type 2 diabetes mellitus without complication, without long-term current use of insulin    2. Hammer toe of right foot    3. Bunion    4. Acquired keratoderma    5. Encounter for diabetic foot exam      Plan:   Type 2 diabetes mellitus without complication, without long-term current use of insulin  -      DIABETES FOOT EXAM    Hammer toe of right foot    Bunion    Acquired keratoderma    Encounter for diabetic foot exam  -      DIABETES FOOT EXAM      Follow up in about 1 year (around 5/16/2026), or if symptoms worsen or fail to  improve.    Procedures        Counseled patient on the aspects of diabetes and how it pertains to the feet.  I explained the importance of proper diabetic foot care and how it is essential for the health of their feet.    I discussed the importance of knowing their HGA1c and that the level needs to be as close to 6 as possible.  I discussed the increase complications of high blood sugar including stroke, blindness, heart attack, kidney failure and loss of limb secondary to neuropathy and PVD.    Patient  was made aware of inspecting their feet.  Patient was told to be aware of any breaks in the skin or redness.  With neuropathy, these areas are not recognized early due to the numbness.  I discussed different treatments available to control the symptoms but whcih may not cure the problem.      Shoe inspection. Patient instructed on proper foot hygeine. We discussed wearing proper shoe gear, daily foot inspections, never walking without protective shoe gear, never putting sharp instruments to feet.    I trimmed the bilateral calluses as a courtesy.  Patient tolerated well.  I did discuss with him in detail that these come due to excess pressure on the toes.  We did reviewed different types of shoe gear and I made recommendations for him.    Counseling:     I provided patient education verbally regarding:   Patient diagnosis, treatment options, as well as alternatives, risks, and benefits.     This note was created using Dragon voice recognition software that occasionally misinterpreted phrases or words.                      [1]   Current Outpatient Medications   Medication Sig Dispense Refill    aspirin (ECOTRIN) 81 MG EC tablet Take 1 tablet (81 mg total) by mouth once daily. 100 tablet 4    fenofibrate 160 MG Tab Take 1 tablet (160 mg total) by mouth every evening. 90 tablet 2    losartan (COZAAR) 50 MG tablet Take 1 tablet by mouth once daily 30 tablet 8    metFORMIN (GLUCOPHAGE) 1000 MG tablet TAKE 1 TABLET BY  MOUTH TWICE DAILY WITH MEALS 180 tablet 1    paroxetine (PAXIL-CR) 25 MG 24 hr tablet Take 1 tablet by mouth once daily 90 tablet 2    rosuvastatin (CRESTOR) 20 MG tablet Take 20 mg by mouth once daily.       No current facility-administered medications for this visit.

## 2025-07-24 ENCOUNTER — OFFICE VISIT (OUTPATIENT)
Dept: FAMILY MEDICINE | Facility: CLINIC | Age: 70
End: 2025-07-24
Payer: MEDICARE

## 2025-07-24 ENCOUNTER — LAB VISIT (OUTPATIENT)
Dept: LAB | Facility: HOSPITAL | Age: 70
End: 2025-07-24
Attending: INTERNAL MEDICINE
Payer: MEDICARE

## 2025-07-24 ENCOUNTER — RESULTS FOLLOW-UP (OUTPATIENT)
Dept: FAMILY MEDICINE | Facility: CLINIC | Age: 70
End: 2025-07-24

## 2025-07-24 VITALS
HEIGHT: 67 IN | SYSTOLIC BLOOD PRESSURE: 137 MMHG | BODY MASS INDEX: 25.96 KG/M2 | WEIGHT: 165.38 LBS | DIASTOLIC BLOOD PRESSURE: 78 MMHG

## 2025-07-24 DIAGNOSIS — I10 ESSENTIAL HYPERTENSION: ICD-10-CM

## 2025-07-24 DIAGNOSIS — E11.9 TYPE 2 DIABETES MELLITUS WITHOUT COMPLICATION, WITHOUT LONG-TERM CURRENT USE OF INSULIN: Primary | ICD-10-CM

## 2025-07-24 DIAGNOSIS — E78.2 MIXED DYSLIPIDEMIA: ICD-10-CM

## 2025-07-24 DIAGNOSIS — F41.1 ANXIETY, GENERALIZED: ICD-10-CM

## 2025-07-24 DIAGNOSIS — Z12.5 SCREENING FOR PROSTATE CANCER: ICD-10-CM

## 2025-07-24 DIAGNOSIS — E11.9 TYPE 2 DIABETES MELLITUS WITHOUT COMPLICATION, WITHOUT LONG-TERM CURRENT USE OF INSULIN: ICD-10-CM

## 2025-07-24 DIAGNOSIS — K21.9 GASTROESOPHAGEAL REFLUX DISEASE WITHOUT ESOPHAGITIS: ICD-10-CM

## 2025-07-24 LAB
ALT SERPL W/O P-5'-P-CCNC: 26 UNIT/L (ref 0–55)
ANION GAP (OHS): 10 MMOL/L (ref 8–16)
BUN SERPL-MCNC: 22 MG/DL (ref 8–23)
CALCIUM SERPL-MCNC: 9.3 MG/DL (ref 8.7–10.5)
CHLORIDE SERPL-SCNC: 104 MMOL/L (ref 95–110)
CHOLEST SERPL-MCNC: 268 MG/DL (ref 120–199)
CHOLEST/HDLC SERPL: 6.9 {RATIO} (ref 2–5)
CO2 SERPL-SCNC: 22 MMOL/L (ref 23–29)
CREAT SERPL-MCNC: 0.9 MG/DL (ref 0.5–1.4)
EAG (OHS): 123 MG/DL (ref 68–131)
GFR SERPLBLD CREATININE-BSD FMLA CKD-EPI: >60 ML/MIN/1.73/M2
GLUCOSE SERPL-MCNC: 133 MG/DL (ref 70–110)
HBA1C MFR BLD: 5.9 % (ref 4–5.6)
HDLC SERPL-MCNC: 39 MG/DL (ref 40–75)
HDLC SERPL: 14.6 % (ref 20–50)
LDLC SERPL CALC-MCNC: 172.8 MG/DL (ref 63–159)
NONHDLC SERPL-MCNC: 229 MG/DL
POTASSIUM SERPL-SCNC: 4.3 MMOL/L (ref 3.5–5.1)
PSA SERPL-MCNC: 0.71 NG/ML
SODIUM SERPL-SCNC: 136 MMOL/L (ref 136–145)
TRIGL SERPL-MCNC: 281 MG/DL (ref 30–150)

## 2025-07-24 PROCEDURE — 84153 ASSAY OF PSA TOTAL: CPT | Mod: HCNC

## 2025-07-24 PROCEDURE — 82374 ASSAY BLOOD CARBON DIOXIDE: CPT | Mod: HCNC

## 2025-07-24 PROCEDURE — 36415 COLL VENOUS BLD VENIPUNCTURE: CPT | Mod: HCNC,PN

## 2025-07-24 PROCEDURE — 1101F PT FALLS ASSESS-DOCD LE1/YR: CPT | Mod: CPTII,HCNC,S$GLB, | Performed by: INTERNAL MEDICINE

## 2025-07-24 PROCEDURE — 1159F MED LIST DOCD IN RCRD: CPT | Mod: CPTII,HCNC,S$GLB, | Performed by: INTERNAL MEDICINE

## 2025-07-24 PROCEDURE — 3075F SYST BP GE 130 - 139MM HG: CPT | Mod: CPTII,HCNC,S$GLB, | Performed by: INTERNAL MEDICINE

## 2025-07-24 PROCEDURE — 84460 ALANINE AMINO (ALT) (SGPT): CPT | Mod: HCNC

## 2025-07-24 PROCEDURE — 4010F ACE/ARB THERAPY RXD/TAKEN: CPT | Mod: CPTII,HCNC,S$GLB, | Performed by: INTERNAL MEDICINE

## 2025-07-24 PROCEDURE — 3008F BODY MASS INDEX DOCD: CPT | Mod: CPTII,HCNC,S$GLB, | Performed by: INTERNAL MEDICINE

## 2025-07-24 PROCEDURE — G2211 COMPLEX E/M VISIT ADD ON: HCPCS | Mod: HCNC,S$GLB,, | Performed by: INTERNAL MEDICINE

## 2025-07-24 PROCEDURE — 80061 LIPID PANEL: CPT | Mod: HCNC

## 2025-07-24 PROCEDURE — 83036 HEMOGLOBIN GLYCOSYLATED A1C: CPT | Mod: HCNC

## 2025-07-24 PROCEDURE — 3288F FALL RISK ASSESSMENT DOCD: CPT | Mod: CPTII,HCNC,S$GLB, | Performed by: INTERNAL MEDICINE

## 2025-07-24 PROCEDURE — 99999 PR PBB SHADOW E&M-EST. PATIENT-LVL III: CPT | Mod: PBBFAC,HCNC,, | Performed by: INTERNAL MEDICINE

## 2025-07-24 PROCEDURE — 1160F RVW MEDS BY RX/DR IN RCRD: CPT | Mod: CPTII,HCNC,S$GLB, | Performed by: INTERNAL MEDICINE

## 2025-07-24 PROCEDURE — 99213 OFFICE O/P EST LOW 20 MIN: CPT | Mod: HCNC,S$GLB,, | Performed by: INTERNAL MEDICINE

## 2025-07-24 PROCEDURE — 3078F DIAST BP <80 MM HG: CPT | Mod: CPTII,HCNC,S$GLB, | Performed by: INTERNAL MEDICINE

## 2025-07-24 PROCEDURE — 1126F AMNT PAIN NOTED NONE PRSNT: CPT | Mod: CPTII,HCNC,S$GLB, | Performed by: INTERNAL MEDICINE

## 2025-07-24 NOTE — PROGRESS NOTES
Subjective:       Patient ID: Yan Hernandez Jr. is a 70 y.o. male.    Chief Complaint: Hypertension, Hyperlipidemia, Diabetes, and Anxiety    History of Present Illness    CHIEF COMPLAINT:  Yan presents with anxiety attacks and recent stomach issues.    HPI:  Yan reports recent anxiety attacks. 2 weeks ago, he had a severe episode in [place], believing he was having a stroke. He drove himself to the ER, where tests were performed. The ER doctor concluded it was an anxiety episode, not a stroke. Yan describes the experience as intense and severe.    He attributes his anxiety to financial issues and concerns about aging. He worries about his future health, potential for stroke, and lack of funds for better care. These concerns affect his sleep; he reports poor sleep quality, getting only about 2 hours of sleep the previous night.    In addition to anxiety, he mentions having significant abdominal pain 2 weeks ago. He is scheduled to see Dr. Duarte next month regarding this episode. In January of the following year, he is scheduled for a procedure to stretch his esophagus to improve swallowing, as well as a colonoscopy.    He is currently taking Paxil 25 mg for anxiety but still feels anxious at times.    He denies any legal issues.    Historically his compliance towards diet and regular exercise measures has been less than stellar.    MEDICATIONS:  Yan is on Paxil 25 mg for anxiety management.    MEDICAL HISTORY:  Yan has a history of anxiety and GI issues.    SURGICAL HISTORY:  He is scheduled for an esophageal dilation procedure in January next year. A colonoscopy is also scheduled for the same time, to be performed concurrently with the esophagus procedure.    TEST RESULTS:  He underwent tests in the ER two weeks ago. The results showed no signs of stroke, and the doctor concluded that the patient was experiencing an anxiety episode.    SOCIAL HISTORY:  Alcohol: Occasional beer during seafood boils  "Occupation: Retired, receives Social Security      ROS:  General: -fever, -chills, -fatigue, -weight gain, -weight loss  Cardiovascular: -chest pain, -palpitations, -lower extremity edema  Respiratory: -cough, -shortness of breath  Gastrointestinal: +abdominal pain, -nausea, -vomiting, -diarrhea, -constipation, -blood in stool, +difficulty swallowing  Skin: -rash, -lesion  Neurological: -headache, -dizziness, -numbness, -tingling  Psychiatric: +anxiety, +panic attacks, +sleep difficulty, +difficulty falling asleep, +difficulty staying asleep, +sleep disturbances         Past Medical History:   Diagnosis Date    Acute bronchitis 6/1/2017    Allergy     atorvastatin, Influenza vaccine    Diabetes mellitus     Diabetes mellitus, type 2     Eosinophilic esophagitis 2/18/2020    Based upon endoscopy done by Dr. Clark Sprague.    GERD (gastroesophageal reflux disease)     History of endoscopy 4/6/2021    Dr. Clark Sprague.  Esophagus appears to have mucosal like eosinophilic esophagitis.  Pathology did not indicate any evidence of malignancy.    Hx of rhinoplasty 10/17/2018    Hyperlipidemia     Hypertension      Social History[1]  History reviewed. No pertinent surgical history.  Family History   Problem Relation Name Age of Onset    Arthritis Mother      Alzheimer's disease Mother      Cancer Father         Objective:      Physical Exam  Blood pressure 137/78, pulse (P) 81, height 5' 7" (1.702 m), weight 75 kg (165 lb 5.5 oz). Body mass index is 25.9 kg/m².  Physical Exam    General: No acute distress. Well-developed. Well-nourished.  Eyes: EOMI. Sclerae anicteric.  Cardiovascular: Regular rate. Regular rhythm. No murmurs. No rubs. No gallops. Normal S1, S2. Heart sounds are good. Pulse is good on the right radial. Pulse on the left radial is palpable.  Respiratory: Normal respiratory effort. Clear to auscultation bilaterally. No rales. No rhonchi. No wheezing. Normal breath sounds. No crackles.  Musculoskeletal: No "  obvious deformity.  Extremities: No lower extremity edema.  Neurological: Alert & oriented . No significant tremor observed.  Psychiatric:  Affable able but with mild chronic underlying anxiety.  Sometimes verbose to details  Skin: Warm. Dry.              Assessment:       No visits with results within 3 Month(s) from this visit.   Latest known visit with results is:   Admission on 01/17/2025, Discharged on 01/17/2025   Component Date Value Ref Range Status    WBC 01/17/2025 8.66  3.90 - 12.70 K/uL Final    RBC 01/17/2025 4.58 (L)  4.60 - 6.20 M/uL Final    Hemoglobin 01/17/2025 13.5 (L)  14.0 - 18.0 g/dL Final    Hematocrit 01/17/2025 40.4  40.0 - 54.0 % Final    MCV 01/17/2025 88  82 - 98 fL Final    MCH 01/17/2025 29.5  27.0 - 31.0 pg Final    MCHC 01/17/2025 33.4  32.0 - 36.0 g/dL Final    RDW 01/17/2025 12.9  11.5 - 14.5 % Final    Platelets 01/17/2025 267  150 - 450 K/uL Final    MPV 01/17/2025 9.9  9.2 - 12.9 fL Final    Immature Granulocytes 01/17/2025 0.2  0.0 - 0.5 % Final    Gran # (ANC) 01/17/2025 6.7  1.8 - 7.7 K/uL Final    Immature Grans (Abs) 01/17/2025 0.02  0.00 - 0.04 K/uL Final    Lymph # 01/17/2025 1.0  1.0 - 4.8 K/uL Final    Mono # 01/17/2025 0.6  0.3 - 1.0 K/uL Final    Eos # 01/17/2025 0.3  0.0 - 0.5 K/uL Final    Baso # 01/17/2025 0.03  0.00 - 0.20 K/uL Final    nRBC 01/17/2025 0  0 /100 WBC Final    Gran % 01/17/2025 77.5 (H)  38.0 - 73.0 % Final    Lymph % 01/17/2025 11.5 (L)  18.0 - 48.0 % Final    Mono % 01/17/2025 6.7  4.0 - 15.0 % Final    Eosinophil % 01/17/2025 3.8  0.0 - 8.0 % Final    Basophil % 01/17/2025 0.3  0.0 - 1.9 % Final    Differential Method 01/17/2025 Automated   Final    Sodium 01/17/2025 136  136 - 145 mmol/L Final    Potassium 01/17/2025 4.1  3.5 - 5.1 mmol/L Final    Chloride 01/17/2025 102  95 - 110 mmol/L Final    CO2 01/17/2025 25  23 - 29 mmol/L Final    Glucose 01/17/2025 134 (H)  70 - 110 mg/dL Final    BUN 01/17/2025 20  8 - 23 mg/dL Final    Creatinine  01/17/2025 1.1  0.5 - 1.4 mg/dL Final    Calcium 01/17/2025 9.6  8.7 - 10.5 mg/dL Final    Total Protein 01/17/2025 7.4  6.0 - 8.4 g/dL Final    Albumin 01/17/2025 4.8  3.5 - 5.2 g/dL Final    Total Bilirubin 01/17/2025 0.5  0.1 - 1.0 mg/dL Final    Alkaline Phosphatase 01/17/2025 34 (L)  55 - 135 U/L Final    AST 01/17/2025 21  10 - 40 U/L Final    ALT 01/17/2025 17  10 - 44 U/L Final    eGFR 01/17/2025 >60.0  >60 mL/min/1.73 m^2 Final    Anion Gap 01/17/2025 9  8 - 16 mmol/L Final    Prothrombin Time 01/17/2025 11.0  9.0 - 12.5 sec Final    INR 01/17/2025 1.0  0.8 - 1.2 Final    TSH 01/17/2025 2.540  0.340 - 5.600 uIU/mL Final    QRS Duration 01/17/2025 72  ms Final    [facility] QTC Calculation 01/17/2025 396  ms Final    Cholesterol 01/17/2025 135  120 - 199 mg/dL Final    Triglycerides 01/17/2025 153 (H)  30 - 150 mg/dL Final    HDL 01/17/2025 41  40 - 75 mg/dL Final    LDL Cholesterol 01/17/2025 63.4  63.0 - 159.0 mg/dL Final    HDL/Cholesterol Ratio 01/17/2025 30.4  20.0 - 50.0 % Final    Total Cholesterol/HDL Ratio 01/17/2025 3.3  2.0 - 5.0 Final    Non-HDL Cholesterol 01/17/2025 94  mg/dL Final    aPTT 01/17/2025 26.8  21.0 - 32.0 sec Final    Troponin I High Sensitivity 01/17/2025 3.3  0.0 - 14.9 pg/mL Final    BNP 01/17/2025 20  0 - 99 pg/mL Final    Troponin I High Sensitivity 01/17/2025 3.3  0.0 - 14.9 pg/mL Final    BNP 01/17/2025 20  0 - 99 pg/mL Final    POCT Glucose 01/17/2025 95  70 - 110 mg/dL Final    POC Creatinine 01/17/2025 1.1  0.5 - 1.4 mg/dL Final    Sample 01/17/2025 VENOUS   Final    Troponin I High Sensitivity 01/17/2025 4.0  0.0 - 14.9 pg/mL Final       Assessment & Plan    E11.9 TYPE 2 DIABETES MELLITUS WITHOUT COMPLICATION, WITHOUT LONG-TERM CURRENT USE OF INSULIN:  - Ordered A1C to be drawn during the current visit.    I10 ESSENTIAL HYPERTENSION:  - No changes to current medication regimen necessary at this time.    E78.2 MIXED DYSLIPIDEMIA:  - Ordered cholesterol to be drawn during the  current visit.    K21.9 GASTROESOPHAGEAL REFLUX DISEASE WITHOUT ESOPHAGITIS:  - Upcoming appointments with Dr. Duarte and scheduled procedures for esophageal dilation.    F41.1 ANXIETY, GENERALIZED:  - Anxiety symptoms related to health concerns, aging, and financial situation.  - Discussed importance of having a support system, including family and potentially [place]-based communities.  - Explained benefits of maintaining hobbies and interests for mental well-being.  - Continue Paxil 25 mg daily for anxiety management.    Z12.5 SCREENING FOR PROSTATE CANCER:  - Ordered PSA to be drawn during the current visit.         Plan:   Type 2 diabetes mellitus without complication, without long-term current use of insulin  Comments:  Continues on metformin 1000 b.i.d..  Reasonable control of blood sugars.  Does not self monitor.  Orders:  -     Hemoglobin A1C; Future; Expected date: 07/24/2025  -     Basic Metabolic Panel; Future; Expected date: 07/24/2025    Essential hypertension  Comments:  Losartan 50 mg with reasonable control of blood pressure and meeting requirement for Ace/ARB for renal protection  Orders:  -     Basic Metabolic Panel; Future; Expected date: 07/24/2025  -     ALT (SGPT); Future; Expected date: 07/24/2025    Mixed dyslipidemia  Comments:  Rosuvastatin 20 mg and check labs for next visit.  No major side effects.  Orders:  -     Basic Metabolic Panel; Future; Expected date: 07/24/2025  -     ALT (SGPT); Future; Expected date: 07/24/2025  -     Lipid Panel; Future; Expected date: 07/24/2025    Gastroesophageal reflux disease without esophagitis  Comments:  Reflux symptoms are stable and he is not currently on any regular medications.    Anxiety, generalized  Comments:  He continues on Paxil CR 25 which is maximum dosage.  I would shy away from added benzos and did some counseling to deal with stressors    Screening for prostate cancer  -     PSA, SCREENING; Future; Expected date: 07/24/2025      Medical  issues reviewed with recent visit to the emergency room for what appeared to be an anxiety attack presenting with symptoms of extremity weakness and fatigue and tremors.  This was in February of 2025.  Workup was unremarkable.  Deemed to have anxiety and agitation.  Discussed managing behavioral issues to deal with  deep breath.  Some people point deal with the issues of anxiety and stress with their [place] and personal Denominational beliefs.  If it helps him he can pursue that route.  Watch out for any chest pains or palpitations.  Unlikely to have low sugar reaction with metformin only.  Lab tests ordered for future    Follow up in about 4 months (around 11/24/2025), or if symptoms worsen or fail to improve, for Diabetes/HTN/Lipids.    Current Medications[2]    This note was generated with the assistance of ambient listening technology. Verbal consent was obtained by the patient and accompanying visitor(s) for the recording of patient appointment to facilitate this note. I attest to having reviewed and edited the generated note for accuracy, though some syntax or spelling errors may persist. Please contact the author of this note for any clarification.      Gregorio Valentine    Visit today included increased complexity associated with the care of the episodic problem DM, HTN ,Lipids addressed and managing the longitudinal care of the patient due to the serious and/or complex managed problem(s) DM HTN .   Key non-pharmacologic interventions include:  Cognitive-behavioral therapy (CBT): CBT is highly effective for panic and anxiety disorders. It helps patients identify and modify dysfunctional thought patterns and behaviors that trigger anxiety. Techniques include cognitive restructuring, exposure to anxiety-provoking situations, and skills training to manage [id number].  Relaxation techniques: These include mindfulness, meditation, hypnosis, and controlled breathing exercises. Slow, steady breathing is particularly useful  during acute anxiety attacks to reduce hyperventilation and physical [id number].  Lifestyle modifications: Regular exercise, adequate sleep, a healthy diet, and maintaining a consistent daily routine can reduce baseline anxiety and improve resilience to stressors3.  Complementary approaches: Practices such as poppy chi, qigong, massage therapy, and emotional freedom technique (EFT/acupoint tapping) have some evidence for reducing anxiety, though more high-quality studies are needed. These are generally considered safe when practiced appropriately1.  Social support: Engaging with friends, family, or support groups can help buffer stress and provide reassurance during anxiety episodes3.  Avoidance of triggers: Identifying and minimizing exposure to known anxiety triggers (such as caffeine, alcohol, or stressful environments) can help prevent attacks3.  Use of digital tools: Mobile apps offering guided relaxation, mindfulness, or breathing exercises can be useful adjuncts for self-management4.  If anxiety attacks are frequent or disabling, structured psychotherapy--especially CBT--remains the most effective non-pharmacologic intervention according to current guidelines       [1]   Social History  Socioeconomic History    Marital status: Single    Number of children: 0   Occupational History    Occupation: Independant Distributor     Employer: [employer]   Tobacco Use    Smoking status: Former     Current packs/day: 0.00     Types: Cigarettes     Quit date: 1975     Years since quittin.5    Smokeless tobacco: Never   Substance and Sexual Activity    Alcohol use: No    Drug use: No    Sexual activity: Not Currently     Social Drivers of Health     Financial Resource Strain: Low Risk  (2022)    Overall Financial Resource Strain (CARDIA)     Difficulty of Paying Living Expenses: Not very hard   Food Insecurity: No Food Insecurity (2022)    Hunger Vital Sign     Worried About Running Out of Food in the Last  Year: Never true     Ran Out of Food in the Last Year: Never true   Transportation Needs: No Transportation Needs (7/16/2022)    PRAPARE - Transportation     Lack of Transportation (Medical): No     Lack of Transportation (Non-Medical): No   Physical Activity: Inactive (7/16/2022)    Exercise Vital Sign     Days of Exercise per Week: 0 days     Minutes of Exercise per Session: 0 min   Stress: Stress Concern Present (7/16/2022)    [place] [employer] [place] [employer] - Occupational Stress Questionnaire     Feeling of Stress : To some extent   Housing Stability: Low Risk  (7/16/2022)    Housing Stability Vital Sign     Unable to Pay for Housing in the Last Year: No     Number of Places Lived in the Last Year: 1     Unstable Housing in the Last Year: No   [2]   Current Outpatient Medications:     aspirin (ECOTRIN) 81 MG EC tablet, Take 1 tablet (81 mg total) by mouth once daily., Disp: 100 tablet, Rfl: 4    fenofibrate 160 MG Tab, Take 1 tablet (160 mg total) by mouth every evening., Disp: 90 tablet, Rfl: 2    losartan (COZAAR) 50 MG tablet, Take 1 tablet by mouth once daily, Disp: 30 tablet, Rfl: 8    metFORMIN (GLUCOPHAGE) 1000 MG tablet, TAKE 1 TABLET BY MOUTH TWICE DAILY WITH MEALS, Disp: 180 tablet, Rfl: 1    paroxetine (PAXIL-CR) 25 MG 24 hr tablet, Take 1 tablet by mouth once daily, Disp: 90 tablet, Rfl: 2    rosuvastatin (CRESTOR) 20 MG tablet, Take 20 mg by mouth once daily., Disp: , Rfl:

## 2025-07-24 NOTE — PATIENT INSTRUCTIONS
Micah Heredia,     If you are due for any health screening(s) below please notify me so we can arrange them to be ordered and scheduled. Most healthy patients at your age complete them, but you are free to accept or refuse.     If you can't do it, I'll definitely understand. If you can, I'd certainly appreciate it!    All of your core healthy metrics are met.

## 2025-07-28 RX ORDER — ROSUVASTATIN CALCIUM 20 MG/1
20 TABLET, COATED ORAL DAILY
Qty: 90 TABLET | Refills: 3 | Status: SHIPPED | OUTPATIENT
Start: 2025-07-28

## 2025-07-28 NOTE — TELEPHONE ENCOUNTER
No care due was identified.  [facility] Embedded Care Due Messages. Reference number: [id number].   7/28/2025 8:34:50 AM CDT

## 2025-07-28 NOTE — TELEPHONE ENCOUNTER
----- Message from Gregorio Grijalva MD sent at 7/24/2025  9:24 PM CDT -----    Notify patient that his glucose level or sugar level is 133 which is not too bad.  Electrolytes and sodium level and potassium level and kidney tests are normal.    His cholesterol levels are super high and   Is he taking his cholesterol medication?  ----- Message -----  From: Lab, Background User  Sent: 7/24/2025   7:58 PM CDT  To: Gregorio Grijalva MD

## 2025-08-18 ENCOUNTER — TELEPHONE (OUTPATIENT)
Dept: FAMILY MEDICINE | Facility: CLINIC | Age: 70
End: 2025-08-18
Payer: MEDICARE